# Patient Record
Sex: FEMALE | Race: ASIAN | NOT HISPANIC OR LATINO | Employment: UNEMPLOYED | URBAN - METROPOLITAN AREA
[De-identification: names, ages, dates, MRNs, and addresses within clinical notes are randomized per-mention and may not be internally consistent; named-entity substitution may affect disease eponyms.]

---

## 2017-11-17 ENCOUNTER — GENERIC CONVERSION - ENCOUNTER (OUTPATIENT)
Dept: OTHER | Facility: OTHER | Age: 52
End: 2017-11-17

## 2017-11-17 ENCOUNTER — ALLSCRIPTS OFFICE VISIT (OUTPATIENT)
Dept: OTHER | Facility: OTHER | Age: 52
End: 2017-11-17

## 2017-11-17 DIAGNOSIS — R51.9 HEADACHE: ICD-10-CM

## 2017-12-05 LAB
A/G RATIO (HISTORICAL): 1.3 (ref 1.2–2.2)
ALBUMIN SERPL BCP-MCNC: 4.1 G/DL (ref 3.5–5.5)
ALP SERPL-CCNC: 61 IU/L (ref 39–117)
ALT SERPL W P-5'-P-CCNC: 16 IU/L (ref 0–32)
AST SERPL W P-5'-P-CCNC: 18 IU/L (ref 0–40)
BILIRUB SERPL-MCNC: 0.3 MG/DL (ref 0–1.2)
BUN SERPL-MCNC: 16 MG/DL (ref 6–24)
BUN/CREA RATIO (HISTORICAL): 21 (ref 9–23)
CALCIUM SERPL-MCNC: 9.4 MG/DL (ref 8.7–10.2)
CHLORIDE SERPL-SCNC: 104 MMOL/L (ref 96–106)
CO2 SERPL-SCNC: 22 MMOL/L (ref 18–29)
CREAT SERPL-MCNC: 0.77 MG/DL (ref 0.57–1)
EGFR AFRICAN AMERICAN (HISTORICAL): 103 ML/MIN/1.73
EGFR-AMERICAN CALC (HISTORICAL): 89 ML/MIN/1.73
GLUCOSE SERPL-MCNC: 102 MG/DL (ref 65–99)
HBA1C MFR BLD HPLC: 5.5 % (ref 4.8–5.6)
POTASSIUM SERPL-SCNC: 4.1 MMOL/L (ref 3.5–5.2)
SODIUM SERPL-SCNC: 142 MMOL/L (ref 134–144)
TOT. GLOBULIN, SERUM (HISTORICAL): 3.2 G/DL (ref 1.5–4.5)
TOTAL PROTEIN (HISTORICAL): 7.3 G/DL (ref 6–8.5)

## 2017-12-06 LAB
BASOPHILS # BLD AUTO: 0 X10E3/UL (ref 0–0.2)
BASOPHILS # BLD AUTO: 1 %
DEPRECATED RDW RBC AUTO: 13.6 % (ref 12.3–15.4)
EOSINOPHIL # BLD AUTO: 0.2 X10E3/UL (ref 0–0.4)
EOSINOPHIL # BLD AUTO: 3 %
HCT VFR BLD AUTO: 41.5 % (ref 34–46.6)
HGB BLD-MCNC: 13.8 G/DL (ref 11.1–15.9)
IMM.GRANULOCYTES (CD4/8) (HISTORICAL): 0 %
IMM.GRANULOCYTES (CD4/8) (HISTORICAL): 0 X10E3/UL (ref 0–0.1)
LYMPHOCYTES # BLD AUTO: 2.9 X10E3/UL (ref 0.7–3.1)
LYMPHOCYTES # BLD AUTO: 46 %
MCH RBC QN AUTO: 30.3 PG (ref 26.6–33)
MCHC RBC AUTO-ENTMCNC: 33.3 G/DL (ref 31.5–35.7)
MCV RBC AUTO: 91 FL (ref 79–97)
MONOCYTES # BLD AUTO: 0.4 X10E3/UL (ref 0.1–0.9)
MONOCYTES (HISTORICAL): 6 %
NEUTROPHILS # BLD AUTO: 2.8 X10E3/UL (ref 1.4–7)
NEUTROPHILS # BLD AUTO: 44 %
PLATELET # BLD AUTO: 263 X10E3/UL (ref 150–379)
RBC (HISTORICAL): 4.55 X10E6/UL (ref 3.77–5.28)
WBC # BLD AUTO: 6.3 X10E3/UL (ref 3.4–10.8)

## 2018-01-11 NOTE — RESULT NOTES
Message   I called the pt to inform thet her CXR and BNP is normal  but nobody picked up the phone  Pt told me during the office visit that [place]       Verified Results  (1) BNP 27Apr2016 12:00AM Ysabel Don     Test Name Result Flag Reference   B-Type Natriuretic Peptide 6 2 pg/mL  0 0-100 0

## 2018-01-17 NOTE — PROGRESS NOTES
Assessment    1  Screening for depression (V79 0) (Z13 89)   2  Screening for colon cancer (V76 51) (Z12 11)   3  Encounter for preventive health examination (V70 0) (Z00 00)    Plan   COLONOSCOPY; Status:Active - Retrospective Authorization; Requested for:2016;   Perform:[facility]; ARV:[id number]; Last Updated By:Norma Calixto; 2016 9:39:17 AM;Ordered; For:Screening for colon cancer; Ordered By:[facility];   *VB-Depression Screening; Status:Resulted - Requires Verification,Retrospective By Protocol Authorization;   Done: [id number] 12:00AM  LPM:[id number]; Last Updated By:Norma Calixto; 2016 9:34:31 AM;Ordered; For:Screening for depression; Ordered By:[facility];   * MAMMO SCREENING BILATERAL W CAD; Status:Hold For - Scheduling; Requested for:2016;   Perform:[facility]; [id number];[id number]; For:Visit for screening mammogram; Ordered By:[facility];      Discussion/Summary  health maintenance visit Currently, she eats an adequate diet  the risks and benefits of cervical cancer screening were discussed Breast cancer screening: the risks and benefits of breast cancer screening were discussed  Chief Complaint  yearly CPE      History of Present Illness  HM, Adult Female: The patient is being seen for a health maintenance evaluation  The last health maintenance visit was 1 year(s) ago  General Health: The patient's health since the last visit is described as good  She has regular dental visits  Screening: Active Problems    1  Abdominal pain (789 00) (R10 9)   2  Abdominal pain, LLQ (left lower quadrant) (789 04) (R10 32)   3  Abnormal mammogram (793 80) (R92 8)   4  Acute upper respiratory infection (465 9) (J06 9)   5  BMI 36 0-36 9,adult (V85 36) (Z68 36)   6  Dyspnea (786 09) (R06 00)   7  Pain in rib (786 50) (R07 81)   8  Paresthesia (782 0) (R20 2)   9  Screening for colon cancer (V76 51) (Z12 11)   10   Screening for depression (V79 0) (Z13 89)   11  Screening for heart disease (V81 2) (Z13 6)   12  Visit for screening mammogram (V76 12) (Z12 31)    Past Medical History    · History of Adult-onset obesity (278 00) (E66 9)   · History of Disc displacement, cervical (722 0) (M50 20)   · History of allergic rhinitis (V12 69) (Z87 09)   · History of fatigue (V13 89) (G15 960)   · History of Perforated ear drum (384 20) (H72 90)   · History of Retinal disorders (362 9) (H35 9)   · History of Tuberculosis, 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)   · History of Vitamin D deficiency disease (268 9) (E55 9)    Family History  Mother    · No pertinent family history    Social History    · Never a smoker    Current Meds   1  Fluticasone Propionate 50 MCG/ACT Nasal Suspension; USE 1 SPRAY IN EACH   NOSTRIL DAILY; Therapy: [id number] to (Last Rx:00Myg7567)  Requested for: 99Qjt0472 Ordered    Allergies    1  No Known Drug Allergies    2  Pollen    Vitals   Recorded: 20FCE0693 03:22PM   Temperature 98 F   Heart Rate 60   Respiration 18   Systolic 385   Diastolic 76   Height 4 ft 10 7 in   Weight 173 lb 3 oz   BMI Calculated 35 34   BSA Calculated 1 73   O2 Saturation 98     Physical Exam    Constitutional   General appearance: No acute distress, well appearing and well nourished  Head and Face   Head and face: Normal     Eyes   Conjunctiva and lids: No swelling, erythema or discharge  Pupils and irises: Equal, round, reactive to light  Ears, Nose, Mouth, and Throat   External inspection of ears and nose: Normal     Otoscopic examination: Tympanic membranes translucent with normal light reflex  Canals patent without erythema  Hearing: Normal     Nasal mucosa, septum, and turbinates: Normal without edema or erythema  Lips, teeth, and gums: Normal, good dentition  Oropharynx: Normal with no erythema, edema, exudate or lesions  Neck   Thyroid: Normal, no thyromegaly      Pulmonary   Respiratory effort: No increased work of breathing or signs of respiratory distress  Auscultation of lungs: Clear to auscultation  Cardiovascular   Auscultation of heart: Normal rate and rhythm, normal S1 and S2, no murmurs  Chest   Breasts: Normal, no dimpling or skin changes appreciated  Palpation of breasts and axillae: Normal, no masses palpated  Chest: Normal     Abdomen   Abdomen: Non-tender, no masses  Liver and spleen: No hepatomegaly or splenomegaly  Genitourinary   External genitalia and vagina: Normal, no lesions appreciated  Urethra: Normal, no discharge  Bladder: Not distended, no tenderness  Cervix: Normal, no lesions  Uterus: Normal size, no tenderness, no masses  Adnexa/Parametria: Normal, no masses or tenderness  Lymphatic   Palpation of lymph nodes in neck: No lymphadenopathy  Palpation of lymph nodes in axillae: No lymphadenopathy  Musculoskeletal   Gait and station: Normal     Digits and nails: Normal without clubbing or cyanosis  Skin   Skin and subcutaneous tissue: Normal without rashes or lesions  Psychiatric   Judgment and insight: Normal     Orientation to person, place, and time: Normal        Results/Data  PHQ-2 Adult Depression Screening 59Lmq4364 03:26PM User, s     Test Name Result Flag Reference   PHQ-2 Adult Depression Score 0     Over the last two weeks, how often have you been bothered by any of the following problems? Little interest or pleasure in doing things: Not at all - 0  Feeling down, depressed, or hopeless: Not at all - 0   PHQ-2 Adult Depression Screening Negative         Signatures   Electronically signed by :  ZAFAR Phillips ; Dec 19 2016  5:11PM EST                       (Author)

## 2018-01-17 NOTE — RESULT NOTES
Verified Results  * XR CHEST PA & LATERAL 27Apr2016 11:15AM Susy Jones Order Number: [id number]     Test Name Result Flag Reference   XR CHEST PA & LATERAL (Report)     CHEST     INDICATION: Dyspnea     COMPARISON: September 13, 2013     VIEWS: PA and lateral; 2 images     FINDINGS:     The cardiomediastinal silhouette is unremarkable  The lungs are clear  No pleural effusions  Bony thorax unremarkable  IMPRESSION:     No active pulmonary disease          Workstation performed: [id number]     Signed by:   Cody Domingo MD   4/27/16

## 2018-01-22 VITALS
TEMPERATURE: 98.4 F | DIASTOLIC BLOOD PRESSURE: 78 MMHG | RESPIRATION RATE: 16 BRPM | BODY MASS INDEX: 34.47 KG/M2 | WEIGHT: 171 LBS | HEIGHT: 59 IN | SYSTOLIC BLOOD PRESSURE: 100 MMHG

## 2018-04-09 ENCOUNTER — HOSPITAL ENCOUNTER (EMERGENCY)
Facility: HOSPITAL | Age: 53
Discharge: HOME | End: 2018-04-09
Attending: EMERGENCY MEDICINE | Admitting: EMERGENCY MEDICINE
Payer: COMMERCIAL

## 2018-04-09 ENCOUNTER — APPOINTMENT (EMERGENCY)
Dept: RADIOLOGY | Facility: HOSPITAL | Age: 53
End: 2018-04-09
Payer: COMMERCIAL

## 2018-04-09 VITALS
OXYGEN SATURATION: 99 % | RESPIRATION RATE: 16 BRPM | SYSTOLIC BLOOD PRESSURE: 125 MMHG | BODY MASS INDEX: 35.71 KG/M2 | TEMPERATURE: 98.7 F | WEIGHT: 175 LBS | HEART RATE: 63 BPM | DIASTOLIC BLOOD PRESSURE: 68 MMHG

## 2018-04-09 DIAGNOSIS — R42 VERTIGO: Primary | ICD-10-CM

## 2018-04-09 LAB
ALBUMIN SERPL BCP-MCNC: 3.7 G/DL (ref 3.5–5)
ALP SERPL-CCNC: 66 U/L (ref 46–116)
ALT SERPL W P-5'-P-CCNC: 23 U/L (ref 12–78)
ANION GAP SERPL CALCULATED.3IONS-SCNC: 9 MMOL/L (ref 4–13)
AST SERPL W P-5'-P-CCNC: 14 U/L (ref 5–45)
BASOPHILS # BLD AUTO: 0 THOUSANDS/ΜL (ref 0–0.1)
BASOPHILS NFR BLD AUTO: 0 % (ref 0–1)
BILIRUB SERPL-MCNC: 0.4 MG/DL (ref 0.2–1)
BUN SERPL-MCNC: 13 MG/DL (ref 5–25)
CALCIUM SERPL-MCNC: 9.3 MG/DL
CHLORIDE SERPL-SCNC: 105 MMOL/L (ref 100–108)
CO2 SERPL-SCNC: 25 MMOL/L (ref 21–32)
CREAT SERPL-MCNC: 0.76 MG/DL (ref 0.6–1.3)
EOSINOPHIL # BLD AUTO: 0 THOUSAND/ΜL (ref 0–0.61)
EOSINOPHIL NFR BLD AUTO: 0 % (ref 0–6)
ERYTHROCYTE [DISTWIDTH] IN BLOOD BY AUTOMATED COUNT: 12.4 % (ref 11.6–15.1)
GFR SERPL CREATININE-BSD FRML MDRD: 90 ML/MIN/1.73SQ M
GLUCOSE SERPL-MCNC: 116 MG/DL (ref 65–140)
HCT VFR BLD AUTO: 42.4 % (ref 37–47)
HGB BLD-MCNC: 14.3 G/DL (ref 12–16)
LYMPHOCYTES # BLD AUTO: 1.4 THOUSANDS/ΜL (ref 0.6–4.47)
LYMPHOCYTES NFR BLD AUTO: 18 % (ref 14–44)
MCH RBC QN AUTO: 30.2 PG (ref 27–31)
MCHC RBC AUTO-ENTMCNC: 33.7 G/DL (ref 31.4–37.4)
MCV RBC AUTO: 90 FL (ref 82–98)
MONOCYTES # BLD AUTO: 0.3 THOUSAND/ΜL (ref 0.17–1.22)
MONOCYTES NFR BLD AUTO: 4 % (ref 4–12)
NEUTROPHILS # BLD AUTO: 6.2 THOUSANDS/ΜL (ref 1.85–7.62)
NEUTS SEG NFR BLD AUTO: 78 % (ref 43–75)
NRBC BLD AUTO-RTO: 0 /100 WBCS
PLATELET # BLD AUTO: 279 THOUSANDS/UL (ref 130–400)
PMV BLD AUTO: 7.3 FL (ref 8.9–12.7)
POTASSIUM SERPL-SCNC: 3.9 MMOL/L (ref 3.5–5.3)
PROT SERPL-MCNC: 8 G/DL (ref 6.4–8.2)
RBC # BLD AUTO: 4.73 MILLION/UL (ref 4.2–5.4)
SODIUM SERPL-SCNC: 139 MMOL/L (ref 136–145)
WBC # BLD AUTO: 8 THOUSAND/UL (ref 4.8–10.8)

## 2018-04-09 PROCEDURE — 80053 COMPREHEN METABOLIC PANEL: CPT | Performed by: EMERGENCY MEDICINE

## 2018-04-09 PROCEDURE — 70450 CT HEAD/BRAIN W/O DYE: CPT

## 2018-04-09 PROCEDURE — 85025 COMPLETE CBC W/AUTO DIFF WBC: CPT | Performed by: EMERGENCY MEDICINE

## 2018-04-09 PROCEDURE — 96361 HYDRATE IV INFUSION ADD-ON: CPT

## 2018-04-09 PROCEDURE — 96374 THER/PROPH/DIAG INJ IV PUSH: CPT

## 2018-04-09 PROCEDURE — 99284 EMERGENCY DEPT VISIT MOD MDM: CPT

## 2018-04-09 PROCEDURE — 36415 COLL VENOUS BLD VENIPUNCTURE: CPT | Performed by: EMERGENCY MEDICINE

## 2018-04-09 PROCEDURE — 96375 TX/PRO/DX INJ NEW DRUG ADDON: CPT

## 2018-04-09 RX ORDER — ONDANSETRON 2 MG/ML
4 INJECTION INTRAMUSCULAR; INTRAVENOUS ONCE
Status: COMPLETED | OUTPATIENT
Start: 2018-04-09 | End: 2018-04-09

## 2018-04-09 RX ORDER — MECLIZINE HYDROCHLORIDE 25 MG/1
25 TABLET ORAL 3 TIMES DAILY PRN
Qty: 30 TABLET | Refills: 0 | Status: SHIPPED | OUTPATIENT
Start: 2018-04-09 | End: 2019-10-09 | Stop reason: ALTCHOICE

## 2018-04-09 RX ORDER — LORAZEPAM 2 MG/ML
0.5 INJECTION INTRAMUSCULAR ONCE
Status: COMPLETED | OUTPATIENT
Start: 2018-04-09 | End: 2018-04-09

## 2018-04-09 RX ORDER — KETOROLAC TROMETHAMINE 30 MG/ML
30 INJECTION, SOLUTION INTRAMUSCULAR; INTRAVENOUS ONCE
Status: COMPLETED | OUTPATIENT
Start: 2018-04-09 | End: 2018-04-09

## 2018-04-09 RX ORDER — ONDANSETRON 4 MG/1
4 TABLET, ORALLY DISINTEGRATING ORAL EVERY 8 HOURS PRN
Qty: 10 TABLET | Refills: 0 | Status: SHIPPED | OUTPATIENT
Start: 2018-04-09 | End: 2018-10-18 | Stop reason: ALTCHOICE

## 2018-04-09 RX ORDER — MECLIZINE HYDROCHLORIDE 25 MG/1
25 TABLET ORAL ONCE
Status: COMPLETED | OUTPATIENT
Start: 2018-04-09 | End: 2018-04-09

## 2018-04-09 RX ADMIN — MECLIZINE HYDROCHLORIDE 25 MG: 25 TABLET ORAL at 20:22

## 2018-04-09 RX ADMIN — SODIUM CHLORIDE 1000 ML: 0.9 INJECTION, SOLUTION INTRAVENOUS at 20:21

## 2018-04-09 RX ADMIN — ONDANSETRON 4 MG: 2 INJECTION INTRAMUSCULAR; INTRAVENOUS at 20:20

## 2018-04-09 RX ADMIN — KETOROLAC TROMETHAMINE 30 MG: 30 INJECTION, SOLUTION INTRAMUSCULAR at 20:21

## 2018-04-09 RX ADMIN — LORAZEPAM 0.5 MG: 2 INJECTION INTRAMUSCULAR; INTRAVENOUS at 20:24

## 2018-04-10 NOTE — DISCHARGE INSTRUCTIONS
Dizziness, Ambulatory Care   GENERAL INFORMATION:   Dizziness  is a term used to describe a feeling of being off balance or unsteady  Common causes of dizziness are an inner ear fluid imbalance or a lack of oxygen in your blood  Your dizziness may be acute (lasts 3 days or less) or chronic (lasts longer than 3 days)  You may have dizzy spells that last from seconds to a few hours  Common symptoms related to dizziness include the following:   · A feeling that your surroundings are moving even though you are standing still    · Ringing in your ears or hearing loss     · Feeling faint or lightheaded     · Weakness or unsteadiness     · Double vision or eye movements you cannot control    · Nausea or vomiting     · Confusion  Seek immediate care for the following symptoms:   · You have a headache that does not go away with medicine  · You have shaking chills and a fever  · You vomit over and over with no relief  · Your vomit or bowel movements are red or black  · You have pain in your chest, back, or abdomen  · You have numbness, especially in your face, arms, or legs  · You have trouble moving your arms or legs  Treatment for dizziness  depends on the cause of your dizziness  You may need medicines to decrease your dizziness or nausea  You may need to be admitted to the hospital for treatment  Manage your symptoms:  Get up slowly from sitting or lying down  Do not drive or operate machinery when you are dizzy  Follow up with your healthcare provider as directed:  Write down your questions so you remember to ask them during your visits  CARE AGREEMENT:   You have the right to help plan your care  Learn about your health condition and how it may be treated  Discuss treatment options with your caregivers to decide what care you want to receive  You always have the right to refuse treatment  The above information is an  only   It is not intended as medical advice for individual conditions or treatments  Talk to your doctor, nurse or pharmacist before following any medical regimen to see if it is safe and effective for you  © 2014 [address] is for End User's use only and may not be sold, redistributed or otherwise used for commercial purposes  All illustrations and images included in CareNotes® are the copyrighted property of A D A M [employer] Inc  or [employer]

## 2018-04-10 NOTE — ED NOTES
Pt states she is feeling better, discharged via wheelchair with instructions and prescriptions       Danni Cardenas RN  04/09/18 212

## 2018-04-10 NOTE — ED PROVIDER NOTES
History  Chief Complaint   Patient presents with    Dizziness     has had  several episodes of vertigo  she has had spinning for 2 days  states it is the worst it has ever been  states her PMD told her to go to the ER  for a Cat Scan     DIZZINESS X 4-5 MONTHS, ON/OFF  C/O DIZZINESS, RM SPINNING AND N/V X 2 DAYS W/ PHOTOPHOBIA  NO NYSTAGMUS, NORMAL NEURO EXAM   DID NOT SEE NEURO OR ENT        History provided by:  Patient  Dizziness   Quality:  Lightheadedness and room spinning  Severity:  Moderate  Onset quality:  Unable to specify  Duration:  5 months  Timing:  Intermittent  Progression:  Waxing and waning  Relieved by:  None tried  Worsened by: Movement and standing up  Ineffective treatments:  None tried  Associated symptoms: vomiting        None       History reviewed  No pertinent past medical history  History reviewed  No pertinent surgical history  History reviewed  No pertinent family history  I have reviewed and agree with the history as documented  Social History   Substance Use Topics    Smoking status: Never Smoker    Smokeless tobacco: Never Used    Alcohol use No        Review of Systems   Gastrointestinal: Positive for vomiting  Neurological: Positive for dizziness  All other systems reviewed and are negative  Physical Exam  ED Triage Vitals   Temperature Pulse Respirations Blood Pressure SpO2   04/09/18 1847 04/09/18 1847 04/09/18 1847 04/09/18 1847 04/09/18 1847   (!) 97 3 °F (36 3 °C) 64 20 147/91 97 %      Temp Source Heart Rate Source Patient Position - Orthostatic VS BP Location FiO2 (%)   04/09/18 2014 -- 04/09/18 2014 04/09/18 2014 --   Tympanic  Lying Left arm       Pain Score       04/09/18 1847       No Pain           Orthostatic Vital Signs  Vitals:    04/09/18 1847 04/09/18 2014   BP: 147/91 124/71   Pulse: 64 67   Patient Position - Orthostatic VS:  Lying       Physical Exam   Constitutional: She is oriented to person, place, and time   She appears well-developed and well-nourished  No distress  HENT:   Head: Normocephalic and atraumatic  Eyes: Conjunctivae and EOM are normal  Pupils are equal, round, and reactive to light  Neck: Normal range of motion  Neck supple  Cardiovascular: Normal rate and regular rhythm  Pulmonary/Chest: Effort normal and breath sounds normal    Abdominal: Soft  There is no tenderness  Musculoskeletal: Normal range of motion  She exhibits no edema, tenderness or deformity  Neurological: She is alert and oriented to person, place, and time  No cranial nerve deficit  She exhibits normal muscle tone  Coordination normal    Skin: Skin is warm and dry  She is not diaphoretic  Nursing note and vitals reviewed        ED Medications  Medications   sodium chloride 0 9 % bolus 1,000 mL (1,000 mL Intravenous New Bag 4/9/18 2021)   ketorolac (TORADOL) injection 30 mg (30 mg Intravenous Given 4/9/18 2021)   ondansetron (ZOFRAN) injection 4 mg (4 mg Intravenous Given 4/9/18 2020)   meclizine (ANTIVERT) tablet 25 mg (25 mg Oral Given 4/9/18 2022)   LORazepam (ATIVAN) 2 mg/mL injection 0 5 mg (0 5 mg Intravenous Given 4/9/18 2024)       Diagnostic Studies  Results Reviewed     Procedure Component Value Units Date/Time    Comprehensive metabolic panel [[id number]] Collected:  04/09/18 2019    Lab Status:  Final result Specimen:  Blood from Arm, Right Updated:  04/09/18 2045     Sodium 139 mmol/L      Potassium 3 9 mmol/L      Chloride 105 mmol/L      CO2 25 mmol/L      Anion Gap 9 mmol/L      BUN 13 mg/dL      Creatinine 0 76 mg/dL      Glucose 116 mg/dL      Calcium 9 3 mg/dL      AST 14 U/L      ALT 23 U/L      Alkaline Phosphatase 66 U/L      Total Protein 8 0 g/dL      Albumin 3 7 g/dL      Total Bilirubin 0 40 mg/dL      eGFR 90 ml/min/1 73sq m     Narrative:         National Kidney Disease Education Program recommendations are as follows:  GFR calculation is accurate only with a steady state creatinine  Chronic Kidney disease less than 60 ml/min/1 73 sq  meters  Kidney failure less than 15 ml/min/1 73 sq  meters  CBC and differential [[id number]]  (Abnormal) Collected:  04/09/18 2019    Lab Status:  Final result Specimen:  Blood from Arm, Right Updated:  04/09/18 2029     WBC 8 00 Thousand/uL      RBC 4 73 Million/uL      Hemoglobin 14 3 g/dL      Hematocrit 42 4 %      MCV 90 fL      MCH 30 2 pg      MCHC 33 7 g/dL      RDW 12 4 %      MPV 7 3 (L) fL      Platelets 752 Thousands/uL      nRBC 0 /100 WBCs      Neutrophils Relative 78 (H) %      Lymphocytes Relative 18 %      Monocytes Relative 4 %      Eosinophils Relative 0 %      Basophils Relative 0 %      Neutrophils Absolute 6 20 Thousands/µL      Lymphocytes Absolute 1 40 Thousands/µL      Monocytes Absolute 0 30 Thousand/µL      Eosinophils Absolute 0 00 Thousand/µL      Basophils Absolute 0 00 Thousands/µL                  CT head wo contrast   Final Result by Job Duane, MD (04/09 2047)      No acute intracranial abnormality  Workstation performed: [id number]                    Procedures  Procedures       Phone Contacts  ED Phone Contact    ED Course  ED Course                                MDM  Number of Diagnoses or Management Options  Diagnosis management comments: NEG W/UP, DZZ RESOLVED    CritCare Time    Disposition  Final diagnoses:   Vertigo     Time reflects when diagnosis was documented in both MDM as applicable and the Disposition within this note     Time User Action Codes Description Comment    4/9/2018  8:59 PM Ace Pal Add [R42] Vertigo       ED Disposition     ED Disposition Condition Comment    Discharge  Minesh Harrell discharge to home/self care      Condition at discharge: Stable        Follow-up Information     Follow up With Specialties Details Why [address] Tristan MD Otolaryngology   Gail Marquis [address]      Sharmila Ocampo MD Neurology In 1 week  [address]  [phone]          Patient's Medications   Discharge Prescriptions    MECLIZINE (ANTIVERT) 25 MG TABLET    Take 1 tablet (25 mg total) by mouth 3 (three) times a day as needed for dizziness       Start Date: 4/9/2018  End Date: --       Order Dose: 25 mg       Quantity: 30 tablet    Refills: 0    ONDANSETRON (ZOFRAN-ODT) 4 MG DISINTEGRATING TABLET    Take 1 tablet (4 mg total) by mouth every 8 (eight) hours as needed for nausea or vomiting for up to 10 doses       Start Date: 4/9/2018  End Date: --       Order Dose: 4 mg       Quantity: 10 tablet    Refills: 0     No discharge procedures on file      ED Provider  Electronically Signed by           Iker Vaughn MD  04/09/18 2518

## 2018-04-12 DIAGNOSIS — H81.10 BENIGN PAROXYSMAL POSITIONAL VERTIGO, UNSPECIFIED LATERALITY: Primary | ICD-10-CM

## 2018-04-13 ENCOUNTER — EVALUATION (OUTPATIENT)
Dept: PHYSICAL THERAPY | Facility: CLINIC | Age: 53
End: 2018-04-13
Payer: COMMERCIAL

## 2018-04-13 DIAGNOSIS — R42 DIZZINESS: Primary | ICD-10-CM

## 2018-04-13 DIAGNOSIS — H81.10 BENIGN PAROXYSMAL POSITIONAL VERTIGO, UNSPECIFIED LATERALITY: ICD-10-CM

## 2018-04-13 PROCEDURE — 97163 PT EVAL HIGH COMPLEX 45 MIN: CPT | Performed by: PHYSICAL THERAPIST

## 2018-04-13 PROCEDURE — G8979 MOBILITY GOAL STATUS: HCPCS | Performed by: PHYSICAL THERAPIST

## 2018-04-13 PROCEDURE — G8978 MOBILITY CURRENT STATUS: HCPCS | Performed by: PHYSICAL THERAPIST

## 2018-04-13 NOTE — PROGRESS NOTES
PT Evaluation     Today's date: 2018  Patient name: Deland Shape  : 1965  MRN: [id number]  Referring provider: Vaibhav Lopez MD  Dx:   Encounter Diagnosis     ICD-10-CM    1  Benign paroxysmal positional vertigo, unspecified laterality H81 10 Ambulatory referral to Physical Therapy                  Assessment  Impairments: abnormal gait  Other impairment: dizziness     Assessment details: Patient presents to skilled PT following recent ER visit for dizziness with history of dizziness on and off  Patient symptoms note continuous dizziness during the day usually with head movement  C spine notes full range of motion  MVBI: denies any increase in dizziness from baseline  Headache occurrence of 2-3 times a week, duration 2-4 hours at a time with pain of 3-6/10  Palpation notes severe hypertonicity in C spine with UT, LV, suboccipital and paraspinal musculature with restriction in joint mobility C 2 to C 4 region  Positional testing was negative for BPPV with testing conducted at start and end of evaluation  Oculomotor assessment noted abnormal findings with smooth pursuits, saccades, VOR cx, VOR x 1 with inability to properly test head thurst due to guard posture with 3 failed attempts  Balance testing notes increased risk for falls per mCTSIB, Gait speed, DGI and observed gait pattern with marked deviations without dynamic head movement  Patient symptoms are concurrent with vestibular dysfunction and will benefit from skilled PT 2 x a week for vestibular therapy, headache management with overall goal to return patient to PLOF of symptom free     Understanding of Dx/Px/POC: good   Prognosis: good    Goals  STG: within 4 to 6 weeks   Patient will report reduction in headache frequency to 1 x a week or less   Patient will report headache pain of 2/10 or less   Patient will report reduction in headache duration to 1 hour or less  Patient will display moderate hypertonicity in C spine   Patient will tolerate 2 minutes of oculomotor exercises with 3/10 dizziness or less  Patient will improve mCTSIB FTEC on foam pad to 30 seconds   Patient will improve gait speed by 1 0 feet / sec  Patient will improve DGI by 3 points or higher     LTG: within 8 to 12 weeks   Patient will score low risk for falls with 3/4 fall risk measures  Patient will score 80% or higher on FOTO subjective questionnaire   Patient will tolerate 2 minutes of oculomotor exercises with visual stimulating background   Patient will display no veering with dynamic head turns  Plan  Planned modality interventions: TENS  Planned therapy interventions: manual therapy, neuromuscular re-education, patient education, postural training, strengthening, stretching, therapeutic activities, therapeutic exercise, sensory integrative techniques, home exercise program, flexibility and balance  Frequency: 2x week  Duration in weeks: 12  Plan details: Cert:   -85 to 47, GA 18         Subjective Evaluation    History of Present Illness  Date of onset: 2018  Mechanism of injury: Patient is a 48year old female who has history of dizziness off and on the past few years once a week with sensation lasting a few days  I get a spinning dizziness when I bend over and turn my head at times  I'm feeling off a little  I have a headache of 6/10  Goal from therapy: get rid of the dizziness  Quality of life: good    Pain  Current pain ratin  At best pain ratin  At worst pain ratin  Location: base of neck   Quality: pressure      Diagnostic Tests  CT scan: normal  Treatments  No previous or current treatments        Objective     Active Range of Motion   Cervical/Thoracic Spine   Cervical    Flexion: WFL  Extension: WFL  Left lateral flexion: WFL  Right lateral flexion: WFL  Left rotation: WF  Right rotation: [facility]    Additional Active Range of Motion Details  Slight dizziness with horizontal head turns       Headache   Frequency: 2-3 times a week Intensity: 3-6/10   Duration: 2 hours to 4 hours   Location: base of the neck and travels to top   Sensation: pounding    Exacerbating Factors: not sure   Relieving Factors:  Medication     Sharp P Test: denies symptoms   Modified vertebral artery test: denies symptoms   Posture: moderate forward head   Palpation: severe hypertonicity with active Trigger points suboccipital, UT, LV and paraspinal BL  Positional testing:  3    Ambulation     Comments   Marked deviations with un-cued gait, increased staggering with dynamic head turns  Balance:    MCTSIB: Firm EO: 30 sec, Firm EC: 30 sec Foam EO: 30 ; Foam EC: 11 seconds     Gait speed: 33/13 1 =2 52 ft/sec    DGI: Dynamic Gait Index  2/3 Gait level surface  2/3 Change in gait speed  2/3 Gait with horizontal head turns  2/3 Gait with vertical head turns  1/3 Gait and pivot turn  2/3 Step over obstacle  2/3 Step around obstacles  2/3 Steps  15/24 Total score (less than 20/24 indicates increased risk of falls in elderly; at least 22/24 indicates safe ambulators)        Functional Assessment     Comments  Oculomotor Screen   -Smooth Pursuits-  Abnormal dizziness unable to track   Gaze holding nystagmus  Able to maintain normal   Spontaneous nystagmus room light- normal   -[place] Convergence- normal   -Saccades- dizziness   -VOR Screen- dizziness with VOR x 1 head turns   -VOR Cancel- dizziness with blurred visual background   -Head Thrust- guarded unable to fully assess, attempted 3 times  Coordination Screen-   -Alternating Toe Taps  Normal     Positional Testing  -[place]- no spinning dizziness negative   -Roll Test-  No spinning dizziness or increase negative           Precautions  has a past medical history of Adult-onset obesity; Allergic rhinitis; Disc displacement, cervical; Retinal disorders; Tuberculosis, laryngeal; and Vitamin D deficiency disease        Specialty Daily Treatment Diary     Manual Exercise Diary                                                                                                                                                                             Modalities

## 2018-04-17 ENCOUNTER — OFFICE VISIT (OUTPATIENT)
Dept: PHYSICAL THERAPY | Facility: CLINIC | Age: 53
End: 2018-04-17
Payer: COMMERCIAL

## 2018-04-17 DIAGNOSIS — H81.10 BENIGN PAROXYSMAL POSITIONAL VERTIGO, UNSPECIFIED LATERALITY: Primary | ICD-10-CM

## 2018-04-17 DIAGNOSIS — R42 DIZZINESS: ICD-10-CM

## 2018-04-17 PROCEDURE — 97112 NEUROMUSCULAR REEDUCATION: CPT | Performed by: PHYSICAL THERAPIST

## 2018-04-17 PROCEDURE — 97110 THERAPEUTIC EXERCISES: CPT | Performed by: PHYSICAL THERAPIST

## 2018-04-17 PROCEDURE — 97530 THERAPEUTIC ACTIVITIES: CPT | Performed by: PHYSICAL THERAPIST

## 2018-04-19 ENCOUNTER — APPOINTMENT (OUTPATIENT)
Dept: PHYSICAL THERAPY | Facility: CLINIC | Age: 53
End: 2018-04-19
Payer: COMMERCIAL

## 2018-04-24 ENCOUNTER — APPOINTMENT (OUTPATIENT)
Dept: PHYSICAL THERAPY | Facility: CLINIC | Age: 53
End: 2018-04-24
Payer: COMMERCIAL

## 2018-04-26 ENCOUNTER — OFFICE VISIT (OUTPATIENT)
Dept: FAMILY MEDICINE CLINIC | Facility: CLINIC | Age: 53
End: 2018-04-26
Payer: COMMERCIAL

## 2018-04-26 ENCOUNTER — OFFICE VISIT (OUTPATIENT)
Dept: PHYSICAL THERAPY | Facility: CLINIC | Age: 53
End: 2018-04-26
Payer: COMMERCIAL

## 2018-04-26 VITALS
RESPIRATION RATE: 18 BRPM | OXYGEN SATURATION: 99 % | SYSTOLIC BLOOD PRESSURE: 130 MMHG | WEIGHT: 177 LBS | HEART RATE: 84 BPM | BODY MASS INDEX: 36.11 KG/M2 | DIASTOLIC BLOOD PRESSURE: 78 MMHG

## 2018-04-26 DIAGNOSIS — R42 DIZZINESS: ICD-10-CM

## 2018-04-26 DIAGNOSIS — H81.10 BENIGN PAROXYSMAL POSITIONAL VERTIGO, UNSPECIFIED LATERALITY: Primary | ICD-10-CM

## 2018-04-26 DIAGNOSIS — R42 VERTIGO: Primary | ICD-10-CM

## 2018-04-26 PROCEDURE — 97164 PT RE-EVAL EST PLAN CARE: CPT

## 2018-04-26 PROCEDURE — G8980 MOBILITY D/C STATUS: HCPCS

## 2018-04-26 PROCEDURE — 97112 NEUROMUSCULAR REEDUCATION: CPT

## 2018-04-26 PROCEDURE — 99213 OFFICE O/P EST LOW 20 MIN: CPT | Performed by: FAMILY MEDICINE

## 2018-04-26 PROCEDURE — G8979 MOBILITY GOAL STATUS: HCPCS

## 2018-04-26 RX ORDER — MECLIZINE HYDROCHLORIDE 25 MG/1
25 TABLET ORAL 3 TIMES DAILY PRN
Qty: 60 TABLET | Refills: 0 | Status: SHIPPED | OUTPATIENT
Start: 2018-04-26 | End: 2018-10-18 | Stop reason: ALTCHOICE

## 2018-04-26 RX ORDER — ONDANSETRON 4 MG/1
4 TABLET, FILM COATED ORAL EVERY 8 HOURS PRN
Qty: 20 TABLET | Refills: 0 | Status: SHIPPED | OUTPATIENT
Start: 2018-04-26 | End: 2018-10-18 | Stop reason: ALTCHOICE

## 2018-04-26 NOTE — PROGRESS NOTES
PT Re-Evaluation  and PT Discharge    Today's date: 2018  Patient name: Claire Adan  : 1965  MRN: [id number]  Referring provider: Romi Holcomb MD  Dx:   Encounter Diagnosis     ICD-10-CM    1  Benign paroxysmal positional vertigo, unspecified laterality H81 10    2  Dizziness R42        Start Time: 1515  Stop Time: 1600  Total time in clinic (min): 45 minutes    Assessment    Assessment details: Patient is a 48year old female reporting to skilled PT today with reports of dizziness  Upon assessment of balance, patient not deemed a fall risk via the DGI, Gait speed, and mCTSIB outcome measures  Oculomotor screen unremarkable and no dizziness produced  Patient able to perform updated HEP (see objective exercise grid) with no dizziness and proper performance of each intervention  Patient given handout with proper exercise performance and frequency  Patient verbalized understanding with all interventions and frequency  At this time, patient will be discharged from skilled PT due to her meeting her goals and reducing her dizziness     Understanding of Dx/Px/POC: good   Prognosis: good    Goals  STG: within 4 to 6 weeks   Patient will report reduction in headache frequency to 1 x a week or less- Met   Patient will report headache pain of 2/10 or less - Met  Patient will report reduction in headache duration to 1 hour or less- Met  Patient will display moderate hypertonicity in C spine- Met  Patient will tolerate 2 minutes of oculomotor exercises with 3/10 dizziness or less- Met  Patient will improve mCTSIB FTEC on foam pad to 30 seconds- Met  Patient will improve gait speed by 1 0 feet / sec- Met  Patient will improve DGI by 3 points or higher- Met    LTG: within 8 to 12 weeks   Patient will score low risk for falls with 3/4 fall risk measures- Met  Patient will score 80% or higher on FOTO subjective questionnaire- Not met (74%)  Patient will tolerate 2 minutes of oculomotor exercises with visual stimulating background- Met  Patient will display no veering with dynamic head turns  - Met     Plan  Duration in weeks: 12  Treatment plan discussed with: family and patient  Plan details: Patient discharged at this time        Subjective Evaluation    History of Present Illness  Date of onset: 2018  Mechanism of injury: Patient reports that she notices a decrease in her "spinning dizziness", but continues to report light-headedness with change in position  Patient is being followed by a cardiologist and advised to prevent her orthostatic response by slowly changing positions  Patient verbalized understanding  Patient also recommended to utilize the lights more at home in the dark to increase safety with balance  Goal from therapy: get rid of the dizziness     Quality of life: good    Pain  Current pain ratin  At best pain ratin  At worst pain ratin  Quality: pressure  Relieving factors: heat    Social Support  Steps to enter house: yes  3  Stairs in house: yes   16  Lives in: multiple-level home  Lives with: spouse      Diagnostic Tests  CT scan: normal  Treatments  No previous or current treatments        Objective     Active Range of Motion   Cervical/Thoracic Spine   Cervical    Flexion: WFL  Extension: WFL  Left lateral flexion: WFL  Right lateral flexion: WFL  Left rotation: WF  Right rotation: [facility]    Additional Active Range of Motion Details  Headache- Patient reports a cessation of headaches, notes decrease with the heating pad    Frequency: 0 times/week   Intensity: 0/10  Duration: Cessation  Location: No location  Sensation: No pounding/tension  Exacerbating Factors: N/A  Relieving Factors:  Heat    Sharp P Test: denies symptoms   Modified vertebral artery test: denies symptoms   Posture: moderate forward head   Palpation: mild hypertonicity in bilateral UT and bilateral Levator scapulae      Positional testing:  3    Ambulation     Comments     Balance:    MCTSIB: Firm EO: 30 sec, Firm EC: 30 sec Foam EO: 30 ; Foam EC: 11 seconds     Re-evaluation (4/26/2018): MCTSIB: Firm EO: 30 sec, Firm EC: 30 sec Foam EO: 30 ; Foam EC: 30 seconds     Gait speed: 33/13 1 =2 52 ft/sec    Re-evaluation (4/26/2018):   Gait Speed: 33 feet/ 6 36 seconds- 5 18 meters/second    DGI: Dynamic Gait Index  2/3 Gait level surface  2/3 Change in gait speed  2/3 Gait with horizontal head turns  2/3 Gait with vertical head turns  1/3 Gait and pivot turn  2/3 Step over obstacle  2/3 Step around obstacles  2/3 Steps  15/24 Total score (less than 20/24 indicates increased risk of falls in elderly; at least 22/24 indicates safe ambulators)    Re-evaluation (4/26/2018):   DGI: Dynamic Gait Index  3/3 Gait level surface  3/3 Change in gait speed  3/3 Gait with horizontal head turns  3/3 Gait with vertical head turns  3/3 Gait and pivot turn  3/3 Step over obstacle  3/3 Step around obstacles  3/3 Steps    24/24 Total score (less than 20/24 indicates increased risk of falls in elderly; at least 22/24 indicates safe ambulators)          Functional Assessment     Comments  Oculomotor Screen   -Smooth Pursuits-  Normal, no dizziness  -Gaze holding nystagmus   Able to maintain normal   -Spontaneous nystagmus room light- normal   -[place] Convergence- normal   -Saccades- Normal, no dizziness  -VOR Screen- No dizziness and normal tracking  -VOR Cancel- Normal tracking  -Head Thrust- Normal Tracking bilaterally    Coordination Screen-   -Alternating Toe Taps  Normal     Positional Testing  -[place]bridge-Hallpike- no spinning dizziness negative   -Roll Test-  No spinning dizziness or increase negative       Flowsheet Rows      Most Recent Value   PT/OT G-Codes   Current Score  74   Projected Score  78   FOTO information reviewed  Yes   Assessment Type  Discharge   G code set  Mobility: Walking & Moving Around   Mobility: Walking and Moving Around Goal Status ()  CI   Mobility: Walking and Moving Around Discharge Status ()  CI Precautions  has a past medical history of Adult-onset obesity; Allergic rhinitis; Disc displacement, cervical; Retinal disorders; Tuberculosis, laryngeal; and Vitamin D deficiency disease        Specialty Daily Treatment Diary     Manual                                                     Exercise Diary  4/26       VOR Cancel with Ambulation Horizontal and Vertical- 30 feet, cycles each       VOR x1 with ambulation Horizontal and vertical- 30 feet, 6 cycles, each       Tandem Walking with Head turns Horizontal and Vertical head turns with ambulation- 30 feet, 6 cycles each                                                                                                                                                   Modalities

## 2018-04-27 NOTE — PROGRESS NOTES
Assessment/Plan:    No problem-specific Assessment & Plan notes found for this encounter  Diagnoses and all orders for this visit:    Vertigo  -     meclizine (ANTIVERT) 25 mg tablet; Take 1 tablet (25 mg total) by mouth 3 (three) times a day as needed for dizziness  -     Ambulatory Referral to Otolaryngology; Future  -     ondansetron (ZOFRAN) 4 mg tablet; Take 1 tablet (4 mg total) by mouth every 8 (eight) hours as needed for nausea or vomiting          Subjective:      Patient ID: Dennie Pound is a 48 y o  female  Marlea Douse is here for follow-up of her ear visit where she had been diagnosed with vertigo she was given meclizine and it is much better that she occasionally does have some unsteady feeling and she did go to the balance center for evaluation and was diagnosed with vestibular dysfunction they released her after 3 visits because she was feeling better  Per the ER is advised they're following up with ENT as 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 and she feels her voice is changing again so would behoove her to have a scope        The following portions of the patient's history were reviewed and updated as appropriate: allergies, past family history, past social history, past surgical history and problem list     Review of Systems   Eyes: Negative  Respiratory: Negative  Cardiovascular: Negative  Objective:      /78   Pulse 84   Resp 18   Wt 80 3 kg (177 lb)   SpO2 99%   BMI 36 11 kg/m²          Physical Exam   Constitutional: She appears well-developed and well-nourished  Pulmonary/Chest: Effort normal and breath sounds normal  No respiratory distress  She has no wheezes  She has no rales     Neurological:   She does have a slow beat nystagmus to the left and she has a mildly positive Romberg

## 2018-05-03 ENCOUNTER — OFFICE VISIT (OUTPATIENT)
Dept: FAMILY MEDICINE CLINIC | Facility: CLINIC | Age: 53
End: 2018-05-03
Payer: COMMERCIAL

## 2018-05-03 VITALS
HEART RATE: 94 BPM | OXYGEN SATURATION: 97 % | SYSTOLIC BLOOD PRESSURE: 130 MMHG | BODY MASS INDEX: 36.11 KG/M2 | RESPIRATION RATE: 18 BRPM | WEIGHT: 177 LBS | DIASTOLIC BLOOD PRESSURE: 80 MMHG

## 2018-05-03 DIAGNOSIS — Z00.00 PHYSICAL EXAM: ICD-10-CM

## 2018-05-03 DIAGNOSIS — Z12.39 SCREENING FOR BREAST CANCER: ICD-10-CM

## 2018-05-03 DIAGNOSIS — Z12.4 SCREENING FOR CERVICAL CANCER: ICD-10-CM

## 2018-05-03 DIAGNOSIS — R35.0 URINE FREQUENCY: ICD-10-CM

## 2018-05-03 DIAGNOSIS — E66.09 CLASS 1 OBESITY DUE TO EXCESS CALORIES WITHOUT SERIOUS COMORBIDITY WITH BODY MASS INDEX (BMI) OF 33.0 TO 33.9 IN ADULT: Primary | ICD-10-CM

## 2018-05-03 DIAGNOSIS — Z12.11 SCREENING FOR COLON CANCER: ICD-10-CM

## 2018-05-03 DIAGNOSIS — R42 VERTIGO: ICD-10-CM

## 2018-05-03 LAB
SL AMB  POCT GLUCOSE, UA: NORMAL
SL AMB LEUKOCYTE ESTERASE,UA: NORMAL
SL AMB POCT BILIRUBIN,UA: NORMAL
SL AMB POCT BLOOD,UA: NORMAL
SL AMB POCT CLARITY,UA: CLEAR
SL AMB POCT COLOR,UA: YELLOW
SL AMB POCT KETONES,UA: NORMAL
SL AMB POCT NITRITE,UA: NORMAL
SL AMB POCT PH,UA: 5
SL AMB POCT SPECIFIC GRAVITY,UA: 1.02
SL AMB POCT URINE PROTEIN: NORMAL
SL AMB POCT UROBILINOGEN: NORMAL

## 2018-05-03 PROCEDURE — 87624 HPV HI-RISK TYP POOLED RSLT: CPT | Performed by: FAMILY MEDICINE

## 2018-05-03 PROCEDURE — 99396 PREV VISIT EST AGE 40-64: CPT | Performed by: FAMILY MEDICINE

## 2018-05-03 PROCEDURE — 88175 CYTOPATH C/V AUTO FLUID REDO: CPT | Performed by: FAMILY MEDICINE

## 2018-05-03 PROCEDURE — 81003 URINALYSIS AUTO W/O SCOPE: CPT | Performed by: FAMILY MEDICINE

## 2018-05-04 DIAGNOSIS — R32 URINARY INCONTINENCE, UNSPECIFIED TYPE: Primary | ICD-10-CM

## 2018-05-04 RX ORDER — TOLTERODINE TARTRATE 2 MG/1
2 TABLET, EXTENDED RELEASE ORAL 2 TIMES DAILY
Qty: 60 TABLET | Refills: 3 | Status: SHIPPED | OUTPATIENT
Start: 2018-05-04 | End: 2018-10-18 | Stop reason: ALTCHOICE

## 2018-05-09 DIAGNOSIS — Z12.39 BREAST CANCER SCREENING: Primary | ICD-10-CM

## 2018-05-09 LAB — HPV RRNA GENITAL QL NAA+PROBE: NORMAL

## 2018-05-10 LAB
LAB AP GYN PRIMARY INTERPRETATION: NORMAL
Lab: NORMAL

## 2018-05-22 NOTE — PROGRESS NOTES
Assessment/Plan:    No problem-specific Assessment & Plan notes found for this encounter  Diagnoses and all orders for this visit:    Class 1 obesity due to excess calories without serious comorbidity with body mass index (BMI) of 33 0 to 33 9 in adult  -     Comprehensive metabolic panel; Future  -     CBC and differential; Future  -     Lipid panel; Future    Vertigo    Screening for breast cancer  -     Cancel: Mammo screening bilateral w cad; Future    Screening for colon cancer  -     Ambulatory referral to Gastroenterology; Future    Screening for cervical cancer  -     Liquid-based pap, diagnostic  -     HPV High Risk; Future  -     HPV High Risk    Urine frequency  -     POCT urine dip auto non-scope          Subjective:      Patient ID: Vertis Olszewski is a 48 y o  female  Concepcion Orlando is here for her physical she hasn't been here in several years her vertigo is getting better she has an appointment with an ENT and a couple of days        The following portions of the patient's history were reviewed and updated as appropriate: past family history, past social history, past surgical history and problem list     Review of Systems   Constitutional: Negative  HENT: Negative  Eyes: Negative  Respiratory: Negative  Cardiovascular: Negative  Gastrointestinal: Negative  Endocrine: Negative  Genitourinary: Negative  Objective:      /80   Pulse 94   Resp 18   Wt 80 3 kg (177 lb)   SpO2 97%   BMI 36 11 kg/m²          Physical Exam   Constitutional: She is oriented to person, place, and time  She appears well-developed and well-nourished  HENT:   Head: Normocephalic and atraumatic  Left Ear: External ear normal    Eyes: Conjunctivae and EOM are normal  Pupils are equal, round, and reactive to light  Neck: Normal range of motion  Neck supple  Cardiovascular: Normal rate and regular rhythm  Pulmonary/Chest: Effort normal and breath sounds normal    Abdominal: Soft  Bowel sounds are normal    Genitourinary: Vagina normal    Musculoskeletal: Normal range of motion  Neurological: She is alert and oriented to person, place, and time  She has normal reflexes  Skin: Skin is warm and dry  Psychiatric: She has a normal mood and affect   Her behavior is normal  Judgment and thought content normal

## 2018-05-29 ENCOUNTER — HOSPITAL ENCOUNTER (OUTPATIENT)
Dept: RADIOLOGY | Facility: HOSPITAL | Age: 53
Discharge: HOME | End: 2018-05-29
Payer: COMMERCIAL

## 2018-05-29 DIAGNOSIS — Z12.39 BREAST CANCER SCREENING: ICD-10-CM

## 2018-05-29 PROCEDURE — 77067 SCR MAMMO BI INCL CAD: CPT

## 2018-10-02 ENCOUNTER — TRANSCRIBE ORDERS (OUTPATIENT)
Dept: ADMINISTRATIVE | Facility: HOSPITAL | Age: 53
End: 2018-10-02

## 2018-10-02 DIAGNOSIS — J38.6 STENOSIS OF LARYNX: Primary | ICD-10-CM

## 2018-10-04 DIAGNOSIS — R42 VERTIGO: Primary | ICD-10-CM

## 2018-10-04 RX ORDER — MECLIZINE HYDROCHLORIDE 25 MG/1
TABLET ORAL
Qty: 30 TABLET | Refills: 0 | Status: SHIPPED | OUTPATIENT
Start: 2018-10-04 | End: 2018-10-18 | Stop reason: ALTCHOICE

## 2018-10-11 ENCOUNTER — HOSPITAL ENCOUNTER (OUTPATIENT)
Dept: CT IMAGING | Facility: HOSPITAL | Age: 53
Discharge: HOME | End: 2018-10-11
Attending: OTOLARYNGOLOGY
Payer: COMMERCIAL

## 2018-10-11 DIAGNOSIS — J38.6 STENOSIS OF LARYNX: ICD-10-CM

## 2018-10-11 PROCEDURE — 70490 CT SOFT TISSUE NECK W/O DYE: CPT

## 2018-10-19 ENCOUNTER — ANESTHESIA (OUTPATIENT)
Dept: PERIOP | Facility: HOSPITAL | Age: 53
End: 2018-10-19
Payer: COMMERCIAL

## 2018-10-19 ENCOUNTER — ANESTHESIA EVENT (OUTPATIENT)
Dept: PERIOP | Facility: HOSPITAL | Age: 53
End: 2018-10-19
Payer: COMMERCIAL

## 2018-10-19 ENCOUNTER — HOSPITAL ENCOUNTER (OUTPATIENT)
Facility: HOSPITAL | Age: 53
Setting detail: OUTPATIENT SURGERY
Discharge: HOME | End: 2018-10-19
Attending: OTOLARYNGOLOGY | Admitting: OTOLARYNGOLOGY
Payer: COMMERCIAL

## 2018-10-19 VITALS
HEART RATE: 59 BPM | DIASTOLIC BLOOD PRESSURE: 62 MMHG | RESPIRATION RATE: 16 BRPM | SYSTOLIC BLOOD PRESSURE: 96 MMHG | OXYGEN SATURATION: 96 % | WEIGHT: 185.19 LBS | HEIGHT: 58 IN | BODY MASS INDEX: 38.87 KG/M2 | TEMPERATURE: 99.2 F

## 2018-10-19 DIAGNOSIS — R06.02 SHORTNESS OF BREATH: Primary | ICD-10-CM

## 2018-10-19 DIAGNOSIS — J38.6 SUPRAGLOTTIC STENOSIS: ICD-10-CM

## 2018-10-19 DIAGNOSIS — R42 VERTIGO: ICD-10-CM

## 2018-10-19 DIAGNOSIS — K21.9 GASTROESOPHAGEAL REFLUX DISEASE, ESOPHAGITIS PRESENCE NOT SPECIFIED: ICD-10-CM

## 2018-10-19 LAB
ATRIAL RATE: 71 BPM
EXT PREGNANCY TEST URINE: NEGATIVE
P AXIS: 51 DEGREES
PR INTERVAL: 172 MS
QRS AXIS: 21 DEGREES
QRSD INTERVAL: 80 MS
QT INTERVAL: 380 MS
QTC INTERVAL: 412 MS
T WAVE AXIS: 2 DEGREES
VENTRICULAR RATE: 71 BPM

## 2018-10-19 PROCEDURE — 93005 ELECTROCARDIOGRAM TRACING: CPT

## 2018-10-19 PROCEDURE — 81025 URINE PREGNANCY TEST: CPT | Performed by: OTOLARYNGOLOGY

## 2018-10-19 PROCEDURE — 93010 ELECTROCARDIOGRAM REPORT: CPT | Performed by: INTERNAL MEDICINE

## 2018-10-19 RX ORDER — HYDROMORPHONE HCL/PF 1 MG/ML
0.5 SYRINGE (ML) INJECTION
Status: DISCONTINUED | OUTPATIENT
Start: 2018-10-19 | End: 2018-10-19 | Stop reason: HOSPADM

## 2018-10-19 RX ORDER — DEXAMETHASONE SODIUM PHOSPHATE 10 MG/ML
INJECTION, SOLUTION INTRAMUSCULAR; INTRAVENOUS AS NEEDED
Status: DISCONTINUED | OUTPATIENT
Start: 2018-10-19 | End: 2018-10-19 | Stop reason: HOSPADM

## 2018-10-19 RX ORDER — LIDOCAINE HYDROCHLORIDE 10 MG/ML
INJECTION, SOLUTION INFILTRATION; PERINEURAL AS NEEDED
Status: DISCONTINUED | OUTPATIENT
Start: 2018-10-19 | End: 2018-10-19 | Stop reason: SURG

## 2018-10-19 RX ORDER — CEFAZOLIN SODIUM 1 G/3ML
INJECTION, POWDER, FOR SOLUTION INTRAMUSCULAR; INTRAVENOUS AS NEEDED
Status: DISCONTINUED | OUTPATIENT
Start: 2018-10-19 | End: 2018-10-19 | Stop reason: SURG

## 2018-10-19 RX ORDER — LIDOCAINE HYDROCHLORIDE 20 MG/ML
INJECTION, SOLUTION EPIDURAL; INFILTRATION; INTRACAUDAL; PERINEURAL AS NEEDED
Status: DISCONTINUED | OUTPATIENT
Start: 2018-10-19 | End: 2018-10-19 | Stop reason: HOSPADM

## 2018-10-19 RX ORDER — GLYCOPYRROLATE 0.2 MG/ML
INJECTION INTRAMUSCULAR; INTRAVENOUS AS NEEDED
Status: DISCONTINUED | OUTPATIENT
Start: 2018-10-19 | End: 2018-10-19 | Stop reason: SURG

## 2018-10-19 RX ORDER — SODIUM CHLORIDE, SODIUM LACTATE, POTASSIUM CHLORIDE, CALCIUM CHLORIDE 600; 310; 30; 20 MG/100ML; MG/100ML; MG/100ML; MG/100ML
20 INJECTION, SOLUTION INTRAVENOUS CONTINUOUS
Status: DISCONTINUED | OUTPATIENT
Start: 2018-10-19 | End: 2018-10-19 | Stop reason: HOSPADM

## 2018-10-19 RX ORDER — METOCLOPRAMIDE HYDROCHLORIDE 5 MG/ML
10 INJECTION INTRAMUSCULAR; INTRAVENOUS ONCE AS NEEDED
Status: COMPLETED | OUTPATIENT
Start: 2018-10-19 | End: 2018-10-19

## 2018-10-19 RX ORDER — ONDANSETRON 2 MG/ML
INJECTION INTRAMUSCULAR; INTRAVENOUS AS NEEDED
Status: DISCONTINUED | OUTPATIENT
Start: 2018-10-19 | End: 2018-10-19 | Stop reason: SURG

## 2018-10-19 RX ORDER — MAGNESIUM HYDROXIDE 1200 MG/15ML
LIQUID ORAL AS NEEDED
Status: DISCONTINUED | OUTPATIENT
Start: 2018-10-19 | End: 2018-10-19 | Stop reason: HOSPADM

## 2018-10-19 RX ORDER — MIDAZOLAM HYDROCHLORIDE 1 MG/ML
INJECTION INTRAMUSCULAR; INTRAVENOUS AS NEEDED
Status: DISCONTINUED | OUTPATIENT
Start: 2018-10-19 | End: 2018-10-19 | Stop reason: SURG

## 2018-10-19 RX ORDER — METHYLPREDNISOLONE 4 MG/1
TABLET ORAL
Qty: 21 TABLET | Refills: 0 | Status: SHIPPED | OUTPATIENT
Start: 2018-10-19 | End: 2019-10-09 | Stop reason: ALTCHOICE

## 2018-10-19 RX ORDER — FENTANYL CITRATE 50 UG/ML
INJECTION, SOLUTION INTRAMUSCULAR; INTRAVENOUS AS NEEDED
Status: DISCONTINUED | OUTPATIENT
Start: 2018-10-19 | End: 2018-10-19 | Stop reason: SURG

## 2018-10-19 RX ORDER — OMEPRAZOLE 40 MG/1
40 CAPSULE, DELAYED RELEASE ORAL EVERY MORNING
Qty: 30 CAPSULE | Refills: 6 | Status: SHIPPED | OUTPATIENT
Start: 2018-10-19 | End: 2020-02-17 | Stop reason: SDUPTHER

## 2018-10-19 RX ORDER — OXYMETAZOLINE HYDROCHLORIDE 0.05 G/100ML
SPRAY NASAL AS NEEDED
Status: DISCONTINUED | OUTPATIENT
Start: 2018-10-19 | End: 2018-10-19 | Stop reason: HOSPADM

## 2018-10-19 RX ORDER — HYDROCODONE BITARTRATE AND ACETAMINOPHEN 5; 325 MG/1; MG/1
1 TABLET ORAL EVERY 6 HOURS PRN
Qty: 10 TABLET | Refills: 0 | Status: SHIPPED | OUTPATIENT
Start: 2018-10-19 | End: 2018-10-29

## 2018-10-19 RX ORDER — ROCURONIUM BROMIDE 10 MG/ML
INJECTION, SOLUTION INTRAVENOUS AS NEEDED
Status: DISCONTINUED | OUTPATIENT
Start: 2018-10-19 | End: 2018-10-19 | Stop reason: SURG

## 2018-10-19 RX ORDER — SODIUM CHLORIDE, SODIUM LACTATE, POTASSIUM CHLORIDE, CALCIUM CHLORIDE 600; 310; 30; 20 MG/100ML; MG/100ML; MG/100ML; MG/100ML
125 INJECTION, SOLUTION INTRAVENOUS CONTINUOUS
Status: DISCONTINUED | OUTPATIENT
Start: 2018-10-19 | End: 2018-10-19 | Stop reason: HOSPADM

## 2018-10-19 RX ORDER — CLINDAMYCIN HYDROCHLORIDE 150 MG/1
150 CAPSULE ORAL EVERY 8 HOURS SCHEDULED
Qty: 15 CAPSULE | Refills: 0 | Status: SHIPPED | OUTPATIENT
Start: 2018-10-19 | End: 2018-10-24

## 2018-10-19 RX ORDER — FAMOTIDINE 40 MG/1
40 TABLET, FILM COATED ORAL
Qty: 30 TABLET | Refills: 6 | Status: SHIPPED | OUTPATIENT
Start: 2018-10-19 | End: 2019-12-30

## 2018-10-19 RX ORDER — ALBUTEROL SULFATE 90 UG/1
AEROSOL, METERED RESPIRATORY (INHALATION) AS NEEDED
Status: DISCONTINUED | OUTPATIENT
Start: 2018-10-19 | End: 2018-10-19 | Stop reason: SURG

## 2018-10-19 RX ORDER — HYDROCODONE BITARTRATE AND ACETAMINOPHEN 5; 325 MG/1; MG/1
1 TABLET ORAL EVERY 6 HOURS PRN
Status: DISCONTINUED | OUTPATIENT
Start: 2018-10-19 | End: 2018-10-19 | Stop reason: HOSPADM

## 2018-10-19 RX ORDER — ALBUTEROL SULFATE 90 UG/1
2 AEROSOL, METERED RESPIRATORY (INHALATION) EVERY 6 HOURS PRN
Qty: 8.5 G | Refills: 3 | Status: SHIPPED | OUTPATIENT
Start: 2018-10-19 | End: 2019-10-09 | Stop reason: ALTCHOICE

## 2018-10-19 RX ORDER — FENTANYL CITRATE/PF 50 MCG/ML
50 SYRINGE (ML) INJECTION
Status: DISCONTINUED | OUTPATIENT
Start: 2018-10-19 | End: 2018-10-19 | Stop reason: HOSPADM

## 2018-10-19 RX ORDER — PROPOFOL 10 MG/ML
INJECTION, EMULSION INTRAVENOUS AS NEEDED
Status: DISCONTINUED | OUTPATIENT
Start: 2018-10-19 | End: 2018-10-19 | Stop reason: SURG

## 2018-10-19 RX ORDER — PROPOFOL 10 MG/ML
INJECTION, EMULSION INTRAVENOUS CONTINUOUS PRN
Status: DISCONTINUED | OUTPATIENT
Start: 2018-10-19 | End: 2018-10-19 | Stop reason: SURG

## 2018-10-19 RX ADMIN — SODIUM CHLORIDE, SODIUM LACTATE, POTASSIUM CHLORIDE, AND CALCIUM CHLORIDE 125 ML/HR: .6; .31; .03; .02 INJECTION, SOLUTION INTRAVENOUS at 13:58

## 2018-10-19 RX ADMIN — NEOSTIGMINE METHYLSULFATE 3 MG: 1 INJECTION, SOLUTION INTRAMUSCULAR; INTRAVENOUS; SUBCUTANEOUS at 13:02

## 2018-10-19 RX ADMIN — ALBUTEROL SULFATE 6 PUFF: 90 AEROSOL, METERED RESPIRATORY (INHALATION) at 13:02

## 2018-10-19 RX ADMIN — LIDOCAINE HYDROCHLORIDE 50 MG: 10 INJECTION, SOLUTION INFILTRATION; PERINEURAL at 10:41

## 2018-10-19 RX ADMIN — FENTANYL CITRATE 50 MCG: 50 INJECTION, SOLUTION INTRAMUSCULAR; INTRAVENOUS at 13:50

## 2018-10-19 RX ADMIN — PROPOFOL 50 MG: 10 INJECTION, EMULSION INTRAVENOUS at 11:37

## 2018-10-19 RX ADMIN — HYDROCODONE BITARTRATE AND ACETAMINOPHEN 1 TABLET: 5; 325 TABLET ORAL at 19:04

## 2018-10-19 RX ADMIN — MIDAZOLAM 2 MG: 1 INJECTION INTRAMUSCULAR; INTRAVENOUS at 10:33

## 2018-10-19 RX ADMIN — PROPOFOL 100 MCG/KG/MIN: 10 INJECTION, EMULSION INTRAVENOUS at 10:45

## 2018-10-19 RX ADMIN — PROPOFOL 200 MG: 10 INJECTION, EMULSION INTRAVENOUS at 10:41

## 2018-10-19 RX ADMIN — GLYCOPYRROLATE 0.4 MG: 0.2 INJECTION, SOLUTION INTRAMUSCULAR; INTRAVENOUS at 13:02

## 2018-10-19 RX ADMIN — METOCLOPRAMIDE 10 MG: 5 INJECTION, SOLUTION INTRAMUSCULAR; INTRAVENOUS at 13:32

## 2018-10-19 RX ADMIN — FENTANYL CITRATE 50 MCG: 50 INJECTION, SOLUTION INTRAMUSCULAR; INTRAVENOUS at 13:32

## 2018-10-19 RX ADMIN — DEXAMETHASONE SODIUM PHOSPHATE 10 MG: 10 INJECTION INTRAMUSCULAR; INTRAVENOUS at 10:41

## 2018-10-19 RX ADMIN — Medication 0.2 MCG/KG/MIN: at 10:46

## 2018-10-19 RX ADMIN — ROCURONIUM BROMIDE 40 MG: 10 INJECTION INTRAVENOUS at 10:41

## 2018-10-19 RX ADMIN — ALBUTEROL SULFATE 6 PUFF: 90 AEROSOL, METERED RESPIRATORY (INHALATION) at 10:45

## 2018-10-19 RX ADMIN — CEFAZOLIN 2000 MG: 1 INJECTION, POWDER, FOR SOLUTION INTRAVENOUS at 10:42

## 2018-10-19 RX ADMIN — SODIUM CHLORIDE, SODIUM LACTATE, POTASSIUM CHLORIDE, AND CALCIUM CHLORIDE 20 ML/HR: .6; .31; .03; .02 INJECTION, SOLUTION INTRAVENOUS at 10:16

## 2018-10-19 RX ADMIN — PROPOFOL 50 MG: 10 INJECTION, EMULSION INTRAVENOUS at 12:17

## 2018-10-19 RX ADMIN — ONDANSETRON 4 MG: 2 INJECTION INTRAMUSCULAR; INTRAVENOUS at 10:41

## 2018-10-19 RX ADMIN — FENTANYL CITRATE 50 MCG: 50 INJECTION, SOLUTION INTRAMUSCULAR; INTRAVENOUS at 10:41

## 2018-10-19 NOTE — INTERIM OP NOTE
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]  Dyspnea    Procedure(s) (LRB):  MICRO DIRECT LARYNGOSCOPY WITH EXCISION LARYNGEAL SCAR/ SUPRAGLOTTOPLASTY   RIGID BRONCHOSCOPY  INJECTION of STEROID to LARYNX    Surgeon(s) and Role:     * Eber Mcdowell MD - Primary    Specimens:  * No specimens in log *    Estimated Blood Loss:   Minimal    Anesthesia Type:   General     Findings:   Scar tissue involving the epiglottis (laryngeal surface, AE folds) with tethering of the arytenoids against the epiglottis  The epiglottis was abnormally shaped and positioned  Laryngeal airway at level of the false folds and true vocal folds was normal appearing  No tracheal pathology        Complications:   None    SIGNATURE: Eber Mcdowell MD   DATE: 2018   TIME: 10:37 AM

## 2018-10-19 NOTE — ANESTHESIA PREPROCEDURE EVALUATION
Review of Systems/Medical History  Patient summary reviewed  Chart reviewed  No history of anesthetic complications     Cardiovascular  Exercise tolerance (METS): >4,     Pulmonary  Shortness of breath,        GI/Hepatic            Endo/Other    Obesity    GYN       Hematology   Musculoskeletal  Back pain , cervical pain,        Neurology    Paresthesias,    Psychology           Physical Exam    Airway    Mallampati score: II  TM Distance: >3 FB  Neck ROM: limited     Dental   No notable dental hx     Cardiovascular      Pulmonary      Other Findings        Anesthesia Plan  ASA Score- 3     Anesthesia Type- general with ASA Monitors  Additional Monitors:   Airway Plan: ETT  Comment: Despite stridor video laryngoscopy provided by Dr Nessa Samson shows patent larynx and CT normal  Pt has b/l cervical disc dz with paresthesias in b/l arms, will proceed with Marleni Fleming for intubation        Plan Factors-    Induction- intravenous  Postoperative Plan- Plan for postoperative opioid use  Planned trial extubation    Informed Consent- Anesthetic plan and risks discussed with patient and spouse  I personally reviewed this patient with the CRNA  Discussed and agreed on the Anesthesia Plan with the CRNA  Lebron Cortez

## 2018-10-19 NOTE — OP NOTE
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]  Dyspnea    Post-Op Diagnosis Codes:  SAME    Procedure(s) (LRB):  MICRO DIRECT LARYNGOSCOPY WITH EXCISION LARYNGEAL SCAR (SUPRAGLOTTOPLASTY)   RIGID BRONCHOSCOPY  INJECTION of STEROID to LARYNX    Specimen(s):  * No specimens in log *    Estimated Blood Loss:   Minimal    Drains:   none    Anesthesia Type:   General    Operative Indications:  Supraglottic scar affecting arytenoids/epiglottis suspected from severe laryngitis in her 20's  Operative Findings:  Scar tissue involving the epiglottis (laryngeal surface, AE folds) with tethering of the arytenoids against the epiglottis  The epiglottis was abnormally shaped and positioned  Laryngeal airway at level of the false folds and true vocal folds was normal appearing  No tracheal pathology to the mainstem bronchi  Complications:   None    Procedure and Technique:  After obtaining informed consent, patient was taken to the operating room by the anesthesia team   Patient was placed in the supine position on the operating room table  Time out was performed to confirm patient's name, ID, and procedure  Patient was intubated with size 5-0 ETT and turned 90 degrees  Eyes were protected with tape, upper teeth were protected with tooth guard  Vallecula laryngoscope (Mere) was used to obtain view of the larynx and suspended on the Johnson stand  Views were obtained with 0 and 70 deg darby anjana telescopes and microscope  Above findings were noted  Scar along the laryngeal surface of the epiglottis was removed with coblator laryngeal wand  Attention was then turned to the supraglottoplasty  The coblator was used to divide scar tissue/mucosa tethering the arytenoids to the epiglottis    The left side was more tethered than the right  Once completed, Decadron was injected into the areas of tissue removal with Jayesh needle  Oxymetazoline soaked pledgets were used for hemostasis  Upon completion, the vocal folds were sprayed with 2% lidocaine and excess suctioned  The laryngoscope and tooth guard were then removed and care of patient was returned to anesthesia for extubation  Patient was then taken to the postoperative anesthesia care unit for recovery       I was present for the entire procedure    Patient Disposition:  PACU     SIGNATURE: Malu López MD  DATE: October 19, 2018  TIME: 10:37 AM

## 2018-10-19 NOTE — DISCHARGE INSTRUCTIONS
Decrease voice use for the next 2 days  Avoid straining, shouting, heavy lifting    Sniff in through nose 10 times every hour while awake - this will pull the vocal folds apart to reduce scar formation    Resume previous diet    Tylenol, Ibuprofen or Rx hydrocodone for pain; If cough, you may use OTC cough suppressant or hydrocodone Rx  Take reflux medicines as prescribed    Take steroid and antibiotic as prescribed    You might have asthma/reactive airway - albuterol was prescribed to be used as needed for wheeze or shortness of breath  We can investigate this further at your follow up appointment      If shortness of breath, increased pain, bleeding, worsening swallow, notify ENT or go to nearest ER    Follow up with Dr Marbella Lynn in 1 week

## 2018-10-19 NOTE — ANESTHESIA POSTPROCEDURE EVALUATION
Post-Op Assessment Note      CV Status:  Stable    Mental Status:  Alert    Hydration Status:  Euvolemic    PONV Controlled:  Controlled    Airway Patency:  Patent    Post Op Vitals Reviewed: Yes          Staff: CRNA       Comments: PT ABLE TO SPEAK           /85 (10/19/18 1325)    Temp      Pulse 64 (10/19/18 1325)   Resp 16 (10/19/18 1325)    SpO2 100 % (10/19/18 1325)

## 2019-03-03 PROBLEM — K21.9 GASTROESOPHAGEAL REFLUX DISEASE: Status: ACTIVE | Noted: 2019-03-03

## 2019-03-03 PROBLEM — R49.0 DYSPHONIA: Status: ACTIVE | Noted: 2019-03-03

## 2019-03-03 PROBLEM — J04.0 REFLUX LARYNGITIS: Status: ACTIVE | Noted: 2019-03-03

## 2019-03-03 PROBLEM — K21.9 REFLUX LARYNGITIS: Status: ACTIVE | Noted: 2019-03-03

## 2019-03-03 PROBLEM — R06.09 DYSPNEA ON EXERTION: Status: ACTIVE | Noted: 2019-03-03

## 2019-03-03 PROBLEM — H90.A31 MIXED CONDUCTIVE AND SENSORINEURAL HEARING LOSS OF RIGHT EAR WITH RESTRICTED HEARING OF LEFT EAR: Status: ACTIVE | Noted: 2019-03-03

## 2019-03-03 PROBLEM — R05.9 COUGH: Status: ACTIVE | Noted: 2019-03-03

## 2019-03-03 PROBLEM — H72.91 PERFORATION OF RIGHT TYMPANIC MEMBRANE: Status: ACTIVE | Noted: 2019-03-03

## 2019-03-03 PROBLEM — J38.6 LARYNGEAL STENOSIS: Status: ACTIVE | Noted: 2019-03-03

## 2019-03-03 PROBLEM — R06.00 DYSPNEA ON EXERTION: Status: ACTIVE | Noted: 2019-03-03

## 2019-09-27 ENCOUNTER — OFFICE VISIT (OUTPATIENT)
Dept: FAMILY MEDICINE CLINIC | Facility: CLINIC | Age: 54
End: 2019-09-27
Payer: COMMERCIAL

## 2019-09-27 VITALS
TEMPERATURE: 98.1 F | BODY MASS INDEX: 38.78 KG/M2 | SYSTOLIC BLOOD PRESSURE: 126 MMHG | WEIGHT: 192 LBS | HEART RATE: 92 BPM | OXYGEN SATURATION: 96 % | DIASTOLIC BLOOD PRESSURE: 80 MMHG

## 2019-09-27 DIAGNOSIS — M67.911 ROTATOR CUFF DISORDER, RIGHT: Primary | ICD-10-CM

## 2019-09-27 DIAGNOSIS — Z12.11 SCREENING FOR COLON CANCER: ICD-10-CM

## 2019-09-27 DIAGNOSIS — Z12.39 SCREENING FOR BREAST CANCER: ICD-10-CM

## 2019-09-27 DIAGNOSIS — Z28.21 INFLUENZA VACCINATION DECLINED: ICD-10-CM

## 2019-09-27 PROCEDURE — 99213 OFFICE O/P EST LOW 20 MIN: CPT | Performed by: FAMILY MEDICINE

## 2019-09-27 PROCEDURE — 96372 THER/PROPH/DIAG INJ SC/IM: CPT

## 2019-09-27 RX ORDER — CYCLOBENZAPRINE HCL 10 MG
10 TABLET ORAL
Qty: 20 TABLET | Refills: 0 | Status: SHIPPED | OUTPATIENT
Start: 2019-09-27 | End: 2019-10-16 | Stop reason: SDUPTHER

## 2019-09-27 RX ORDER — NAPROXEN 500 MG/1
500 TABLET ORAL 2 TIMES DAILY WITH MEALS
Qty: 20 TABLET | Refills: 0 | Status: SHIPPED | OUTPATIENT
Start: 2019-09-27 | End: 2019-10-09 | Stop reason: SDUPTHER

## 2019-09-27 RX ORDER — METHYLPREDNISOLONE ACETATE 40 MG/ML
40 INJECTION, SUSPENSION INTRA-ARTICULAR; INTRALESIONAL; INTRAMUSCULAR; SOFT TISSUE ONCE
Status: COMPLETED | OUTPATIENT
Start: 2019-09-27 | End: 2019-09-27

## 2019-09-27 RX ADMIN — METHYLPREDNISOLONE ACETATE 40 MG: 40 INJECTION, SUSPENSION INTRA-ARTICULAR; INTRALESIONAL; INTRAMUSCULAR; SOFT TISSUE at 17:24

## 2019-10-03 NOTE — PROGRESS NOTES
Assessment/Plan:    No problem-specific Assessment & Plan notes found for this encounter  Diagnoses and all orders for this visit:    Rotator cuff disorder, right  -     Ambulatory referral to Pain Management; Future  -     naproxen (EC NAPROSYN) 500 MG EC tablet; Take 1 tablet (500 mg total) by mouth 2 (two) times a day with meals  -     cyclobenzaprine (FLEXERIL) 10 mg tablet; Take 1 tablet (10 mg total) by mouth daily at bedtime  -     methylPREDNISolone acetate (DEPO-MEDROL) injection 40 mg    Screening for breast cancer  -     Mammo screening bilateral w 3d & cad; Future    Screening for colon cancer  -     Ambulatory referral to Gastroenterology; Future    Influenza vaccination declined  -     influenza vaccine, [id number], quadrivalent, recombinant, PF, 0 5 mL, for patients 18 yr+ (FLUBLOK)          Subjective:      Patient ID: Nadege Varma is a 47 y o  female      Here for severe right shoulder pain   She has had neck issues in the past with MRI showing disc ds she never fu with my recommendation to go to ortho  She has no knon trauma but was unable to lay on right side and  is my patient as well and stated she was horribly uncomfortable  He has to       The following portions of the patient's history were reviewed and updated as appropriate: current medications, past family history, past medical history, past social history, past surgical history and problem list     Review of Systems      Objective:      /80 (BP Location: Left arm, Patient Position: Sitting, Cuff Size: Large)   Pulse 92   Temp 98 1 °F (36 7 °C) (Tympanic)   Wt 87 1 kg (192 lb)   SpO2 96%   BMI 38 78 kg/m²          Physical Exam    General-patient is in moderate distress with pain  Exam on active range of motion shows significant decrease in ab duction, flexion and extension at the right shoulder  She is completely unable to internal internally rotate her right shoulder  She has decreased range of motion of her cervical spine in side bending to the right into the left decreased rotation to the left and flexion extension seems to be within normal limits  She has no numbness or tingling or radiculopathy down the shoulder  She has point tenderness in the bicipital tendon groove

## 2019-10-09 ENCOUNTER — OFFICE VISIT (OUTPATIENT)
Dept: FAMILY MEDICINE CLINIC | Facility: CLINIC | Age: 54
End: 2019-10-09
Payer: COMMERCIAL

## 2019-10-09 VITALS
SYSTOLIC BLOOD PRESSURE: 110 MMHG | RESPIRATION RATE: 16 BRPM | BODY MASS INDEX: 38.58 KG/M2 | HEART RATE: 85 BPM | OXYGEN SATURATION: 97 % | DIASTOLIC BLOOD PRESSURE: 70 MMHG | WEIGHT: 191 LBS

## 2019-10-09 DIAGNOSIS — M54.2 CERVICALGIA: ICD-10-CM

## 2019-10-09 DIAGNOSIS — Z00.00 PHYSICAL EXAM: Primary | ICD-10-CM

## 2019-10-09 DIAGNOSIS — M67.911 ROTATOR CUFF DISORDER, RIGHT: ICD-10-CM

## 2019-10-09 PROCEDURE — 99396 PREV VISIT EST AGE 40-64: CPT | Performed by: FAMILY MEDICINE

## 2019-10-09 RX ORDER — NAPROXEN 500 MG/1
500 TABLET ORAL 2 TIMES DAILY WITH MEALS
Qty: 20 TABLET | Refills: 0 | Status: SHIPPED | OUTPATIENT
Start: 2019-10-09 | End: 2019-10-29 | Stop reason: SDUPTHER

## 2019-10-09 NOTE — PROGRESS NOTES
Assessment/Plan:    No problem-specific Assessment & Plan notes found for this encounter  Diagnoses and all orders for this visit:    Physical exam  -     Lipid panel; Future  -     CBC and differential; Future  -     Comprehensive metabolic panel; Future    Rotator cuff disorder, right  -     naproxen (EC NAPROSYN) 500 MG EC tablet; Take 1 tablet (500 mg total) by mouth 2 (two) times a day with meals    Cervicalgia          Subjective:      Patient ID: Kari Ledesma is a 47 y o  female  Patient is here for her physical I did see her couple of weeks ago with some shoulder pain especially her right shoulder she is considerably better per her  and her she is not crying at night and she is not screaming in pain she would like a refill on her Naprosyn    she does have an appointment with pain management on October 26th    As such her menses go she has not gotten    In about 3-4 years          The following portions of the patient's history were reviewed and updated as appropriate: current medications, past family history, past medical history, past social history, past surgical history and problem list     Review of Systems   Constitutional: Negative  HENT: Negative  Eyes: Negative  Cardiovascular: Negative  Endocrine: Negative  Genitourinary: Negative  Allergic/Immunologic: Negative  Neurological: Negative  Hematological: Negative  Objective:      /70   Pulse 85   Resp 16   Wt 86 6 kg (191 lb)   SpO2 97%   BMI 38 58 kg/m²          Physical Exam   Constitutional: She appears well-developed and well-nourished  HENT:   Head: Normocephalic and atraumatic  Right Ear: External ear normal    Left Ear: External ear normal    Mouth/Throat: Oropharynx is clear and moist    Eyes: Pupils are equal, round, and reactive to light  EOM are normal  Right eye exhibits no discharge  Left eye exhibits no discharge  No scleral icterus  Neck: No JVD present   No tracheal deviation present  No thyromegaly present  Cardiovascular: Normal rate, regular rhythm, normal heart sounds and intact distal pulses  Exam reveals no gallop and no friction rub  No murmur heard  Pulmonary/Chest: Effort normal and breath sounds normal    Abdominal: Soft  Bowel sounds are normal  She exhibits no distension and no mass  There is no tenderness  There is no rebound and no guarding  No hernia  She does have at times some crampy pain in the right lower quadrant but it is never at anything that last long and does not even happen every day she can go months in between I told her for comes that he consistency to please let me know   Lymphadenopathy:     She has no cervical adenopathy

## 2019-10-16 DIAGNOSIS — M67.911 ROTATOR CUFF DISORDER, RIGHT: ICD-10-CM

## 2019-10-16 RX ORDER — CYCLOBENZAPRINE HCL 10 MG
TABLET ORAL
Qty: 30 TABLET | Refills: 0 | Status: SHIPPED | OUTPATIENT
Start: 2019-10-16 | End: 2020-07-17 | Stop reason: ALTCHOICE

## 2019-10-26 ENCOUNTER — HOSPITAL ENCOUNTER (OUTPATIENT)
Dept: RADIOLOGY | Facility: HOSPITAL | Age: 54
Discharge: HOME | End: 2019-10-26
Attending: FAMILY MEDICINE
Payer: COMMERCIAL

## 2019-10-26 VITALS — BODY MASS INDEX: 38.58 KG/M2 | HEIGHT: 59 IN

## 2019-10-26 DIAGNOSIS — Z12.39 SCREENING FOR BREAST CANCER: ICD-10-CM

## 2019-10-26 PROCEDURE — 77063 BREAST TOMOSYNTHESIS BI: CPT

## 2019-10-26 PROCEDURE — 77067 SCR MAMMO BI INCL CAD: CPT

## 2019-10-28 ENCOUNTER — TELEPHONE (OUTPATIENT)
Dept: RADIOLOGY | Facility: HOSPITAL | Age: 54
End: 2019-10-28

## 2019-10-29 ENCOUNTER — CONSULT (OUTPATIENT)
Dept: PAIN MEDICINE | Facility: CLINIC | Age: 54
End: 2019-10-29
Payer: COMMERCIAL

## 2019-10-29 ENCOUNTER — TELEPHONE (OUTPATIENT)
Dept: RADIOLOGY | Facility: HOSPITAL | Age: 54
End: 2019-10-29

## 2019-10-29 VITALS
WEIGHT: 192.8 LBS | BODY MASS INDEX: 38.87 KG/M2 | HEIGHT: 59 IN | SYSTOLIC BLOOD PRESSURE: 132 MMHG | DIASTOLIC BLOOD PRESSURE: 82 MMHG | HEART RATE: 88 BPM

## 2019-10-29 DIAGNOSIS — M67.911 ROTATOR CUFF DISORDER, RIGHT: ICD-10-CM

## 2019-10-29 DIAGNOSIS — G89.29 CHRONIC PAIN OF BOTH SHOULDERS: ICD-10-CM

## 2019-10-29 DIAGNOSIS — M25.512 CHRONIC PAIN OF BOTH SHOULDERS: ICD-10-CM

## 2019-10-29 DIAGNOSIS — M54.50 CHRONIC MIDLINE LOW BACK PAIN WITHOUT SCIATICA: ICD-10-CM

## 2019-10-29 DIAGNOSIS — G89.4 CHRONIC PAIN SYNDROME: Primary | ICD-10-CM

## 2019-10-29 DIAGNOSIS — G89.29 CHRONIC MIDLINE LOW BACK PAIN WITHOUT SCIATICA: ICD-10-CM

## 2019-10-29 DIAGNOSIS — M25.511 CHRONIC PAIN OF BOTH SHOULDERS: ICD-10-CM

## 2019-10-29 DIAGNOSIS — M54.2 NECK PAIN: ICD-10-CM

## 2019-10-29 LAB
ALBUMIN SERPL-MCNC: 4.3 G/DL (ref 3.5–5.5)
ALBUMIN/GLOB SERPL: 1.7 {RATIO} (ref 1.2–2.2)
ALP SERPL-CCNC: 73 IU/L (ref 39–117)
ALT SERPL-CCNC: 18 IU/L (ref 0–32)
AST SERPL-CCNC: 19 IU/L (ref 0–40)
BASOPHILS # BLD AUTO: 0 X10E3/UL (ref 0–0.2)
BASOPHILS NFR BLD AUTO: 1 %
BILIRUB SERPL-MCNC: 0.4 MG/DL (ref 0–1.2)
BUN SERPL-MCNC: 13 MG/DL (ref 6–24)
BUN/CREAT SERPL: 17 (ref 9–23)
CALCIUM SERPL-MCNC: 9.2 MG/DL (ref 8.7–10.2)
CHLORIDE SERPL-SCNC: 102 MMOL/L (ref 96–106)
CHOLEST SERPL-MCNC: 211 MG/DL (ref 100–199)
CO2 SERPL-SCNC: 23 MMOL/L (ref 20–29)
CREAT SERPL-MCNC: 0.78 MG/DL (ref 0.57–1)
EOSINOPHIL # BLD AUTO: 0.2 X10E3/UL (ref 0–0.4)
EOSINOPHIL NFR BLD AUTO: 3 %
ERYTHROCYTE [DISTWIDTH] IN BLOOD BY AUTOMATED COUNT: 13.6 % (ref 12.3–15.4)
GLOBULIN SER-MCNC: 2.6 G/DL (ref 1.5–4.5)
GLUCOSE SERPL-MCNC: 99 MG/DL (ref 65–99)
HCT VFR BLD AUTO: 37.3 % (ref 34–46.6)
HDLC SERPL-MCNC: 53 MG/DL
HGB BLD-MCNC: 12.9 G/DL (ref 11.1–15.9)
IMM GRANULOCYTES # BLD: 0 X10E3/UL (ref 0–0.1)
IMM GRANULOCYTES NFR BLD: 0 %
LDLC SERPL CALC-MCNC: 121 MG/DL (ref 0–99)
LYMPHOCYTES # BLD AUTO: 2.2 X10E3/UL (ref 0.7–3.1)
LYMPHOCYTES NFR BLD AUTO: 37 %
MCH RBC QN AUTO: 29.6 PG (ref 26.6–33)
MCHC RBC AUTO-ENTMCNC: 34.6 G/DL (ref 31.5–35.7)
MCV RBC AUTO: 86 FL (ref 79–97)
MONOCYTES # BLD AUTO: 0.3 X10E3/UL (ref 0.1–0.9)
MONOCYTES NFR BLD AUTO: 5 %
NEUTROPHILS # BLD AUTO: 3.2 X10E3/UL (ref 1.4–7)
NEUTROPHILS NFR BLD AUTO: 54 %
PLATELET # BLD AUTO: 270 X10E3/UL (ref 150–450)
POTASSIUM SERPL-SCNC: 4.5 MMOL/L (ref 3.5–5.2)
PROT SERPL-MCNC: 6.9 G/DL (ref 6–8.5)
RBC # BLD AUTO: 4.36 X10E6/UL (ref 3.77–5.28)
SL AMB EGFR AFRICAN AMERICAN: 100 ML/MIN/1.73
SL AMB EGFR NON AFRICAN AMERICAN: 86 ML/MIN/1.73
SL AMB VLDL CHOLESTEROL CALC: 37 MG/DL (ref 5–40)
SODIUM SERPL-SCNC: 140 MMOL/L (ref 134–144)
TRIGL SERPL-MCNC: 183 MG/DL (ref 0–149)
WBC # BLD AUTO: 6 X10E3/UL (ref 3.4–10.8)

## 2019-10-29 PROCEDURE — 99204 OFFICE O/P NEW MOD 45 MIN: CPT | Performed by: ANESTHESIOLOGY

## 2019-10-29 NOTE — PROGRESS NOTES
Pain Medicine Follow-Up Note    Assessment:  1  Chronic pain syndrome    2  Neck pain    3  Chronic pain of both shoulders    4  Chronic midline low back pain without sciatica        Plan:  Orders Placed This Encounter   Procedures    Ambulatory referral to Orthopedic Surgery     Standing Status:   Future     Standing Expiration Date:   10/29/2020     Referral Priority:   Routine     Referral Type:   Consult - AMB     Referral Reason:   Specialty Services Required     Referred to Provider:   Imelda Garay DO     Requested Specialty:   Orthopedic Surgery     Number of Visits Requested:   1     Expiration Date:   10/29/2020    Ambulatory referral to Physical Therapy     Standing Status:   Future     Standing Expiration Date:   10/29/2020     Referral Priority:   Routine     Referral Type:   Physical Therapy     Referral Reason:   Specialty Services Required     Requested Specialty:   Physical Therapy     Number of Visits Requested:   1     Expiration Date:   10/29/2020     My impressions and treatment recommendations were discussed in detail with the patient who verbalized understanding and had no further questions  The patient is currently complaining of neck pain, bilateral shoulder pain, and low back pain as her primary pain complaints  She has not yet undergone a course of physical therapy, so I felt it reasonable to start her on physical therapy 2-3 times per week for 4-6 weeks  She is also complaining of bilateral shoulder pain and states that her symptoms started with bilateral shoulder pain before be given a steroid injection by Dr Ashok Chavez  As such, I would like to rule out a primary shoulder problem  I have referred the patient to see Dr Jama Young in this regard  If the patient does not respond to physical therapy, I will consider having patient undergo a MRI of the cervical spine and possibly a MRI of the lumbar spine as well      Once the patient completes her physical therapy course, we will discuss further treatment options  Discharge instructions were provided  I personally saw and examined the patient and I agree with the above discussed plan of care  History of Present Illness:    Daphnie Rudolph is a 47 y o  female who presents to [facility] for interval re-evaluation of the above stated pain complaints  The patient has a past medical and chronic pain history as outlined in the assessment section  She was referred by Dr Jacquelyn Chaudhary  At today's office visit, the patient's pain score is 8/10 on the verbal numerical pain rating scale  The patient describes her pain as moderate to severe and nearly constant in nature  There is no typical pattern to her pain  She describes her pain as "cramping, shooting, numbness, sharp, pins and needles, pressure like, and throbbing    The patient reports weakness in her upper and lower extremities  He states that lying down, standing, bending, sitting, walking, exercise, and coughing/sneezing increases pain  Heat/ice treatment and TENS unit therapy provides moderate pain relief  She does report that acetaminophen over-the-counter does provide her some pain relief  Other than as stated above, the patient denies any interval changes in medications, medical condition, mental condition, symptoms, or allergies since the last office visit  Review of Systems:    Review of Systems   Constitutional: Positive for unexpected weight change  Negative for fever  HENT: Negative for trouble swallowing  Eyes: Negative for visual disturbance  Respiratory: Positive for shortness of breath  Negative for wheezing  Cardiovascular: Positive for leg swelling  Negative for chest pain and palpitations  Gastrointestinal: Positive for nausea  Negative for constipation, diarrhea and vomiting  Endocrine: Positive for polydipsia and polyuria  Negative for cold intolerance and heat intolerance     Genitourinary: Negative for difficulty urinating and frequency  Musculoskeletal: Positive for arthralgias  Negative for gait problem, joint swelling and myalgias  Skin: Negative for rash  Neurological: Positive for dizziness, numbness and headaches  Negative for seizures, syncope and weakness  Hematological: Does not bruise/bleed easily  Psychiatric/Behavioral: Negative for dysphoric mood  All other systems reviewed and are negative  Patient Active Problem List   Diagnosis    Dyspnea on exertion    Dysphonia    Laryngeal stenosis    Cough    Gastroesophageal reflux disease    Reflux laryngitis    Mixed conductive and sensorineural hearing loss of right ear with restricted hearing of left ear    Perforation of right tympanic membrane       Past Medical History:   Diagnosis Date    Adult-onset obesity     Allergic rhinitis     Disc displacement, cervical     Retinal disorders     last assessed 6/30/14    Tuberculosis, laryngeal     Vitamin D deficiency disease     last assessed 6/30/14       Past Surgical History:   Procedure Laterality Date    BREAST SURGERY      FL BRONCHOSCOPY,DIAGNOSTIC N/A 10/19/2018    Procedure: BRONCHOSCOPY RIGID;  Surgeon: Zeb العراقي MD;  [facility];  Service: ENT    FL LARYNGOSCOPY,DIRCT,OP [address] N/A 10/19/2018    Procedure: EXCISION LARYNGEAL SCAR; SUPRAGLOTTOPLASTY  POSSIBLE JET VENTILATION; CO2 LASER; COBLATOR;  Surgeon: Zeb العراقي MD;  [facility];  Service: ENT    FL LARYNGOSCOPY,DIRCT,OP,BIOPSY N/A 10/19/2018    Procedure: MICRO DIRECT LARYNGOSCOPY;  Surgeon: Zeb العراقي MD;  [facility];  Service: ENT       History reviewed  No pertinent family history      Social History     Occupational History    Not on file   Tobacco Use    Smoking status: Never Smoker    Smokeless tobacco: Never Used   Substance and Sexual Activity    Alcohol use: No    Drug use: No    Sexual activity: Never     Birth control/protection: Post-menopausal Current Outpatient Medications:     cyclobenzaprine (FLEXERIL) 10 mg tablet, TAKE 1 TABLET BY MOUTH DAILY AT BEDTIME, Disp: 30 tablet, Rfl: 0    famotidine (PEPCID) 40 MG tablet, Take 1 tablet (40 mg total) by mouth daily at bedtime, Disp: 30 tablet, Rfl: 6    naproxen (EC NAPROSYN) 500 MG EC tablet, Take 1 tablet (500 mg total) by mouth 2 (two) times a day with meals, Disp: 20 tablet, Rfl: 0    omeprazole (PriLOSEC) 40 MG capsule, Take 1 capsule (40 mg total) by mouth every morning, Disp: 30 capsule, Rfl: 6    Allergies   Allergen Reactions    Pollen Extract        Physical Exam:    /82   Pulse 88   Ht 4' 11" (1 499 m)   Wt 87 5 kg (192 lb 12 8 oz)   BMI 38 94 kg/m²     Constitutional:obese  Eyes:anicteric  HEENT:grossly intact  Neck:supple, symmetric, trachea midline and no masses   Pulmonary:even and unlabored  Cardiovascular:No edema or pitting edema present  Skin:Normal without rashes or lesions and well hydrated  Psychiatric:Mood and affect appropriate  Neurologic:Cranial Nerves II-XII grossly intact  Musculoskeletal:normal     Cervical Spine Exam    Appearance:  Normal lordosis  Palpation/Tenderness:  no tenderness or spasm  Sensory:  no sensory deficits noted  Range of Motion:  Flexion:  No limitation  with pain  Extension:  No limitation  with pain  Lateral Flexion - Left:  No limitation  with pain  Lateral Flexion - Right:  No limitation  with pain  Rotation - Left:  No limitation  with pain  Rotation - Right:  No limitation  with pain  Motor Strength:  Left Arm Flexion  5/5  Left Arm Extension  5/5  Right Arm Flexion  5/5  Right Arm Extension  5/5  Left Wrist Flexion  5/5  Left Wrist Extension  5/5  Left Finger Abduction  5/5  Right Finger Abduction  5/5  Left Pincer Grasp  5/5  Right Pincer Grasp  5/5  Left    5/5  Right   5/5  Reflexes:  Left Biceps:  2+   Right Biceps:  2+   Left Brachioradialis:  2+   Right Brachioradialis:  2+   Left Triceps:  2+   Right Triceps:  2+ Special Tests:  Left Spurlings:  negative  Right Spurlings  negative      Orders Placed This Encounter   Procedures    Ambulatory referral to Orthopedic Surgery    Ambulatory referral to Physical Therapy

## 2019-11-04 ENCOUNTER — TELEPHONE (OUTPATIENT)
Dept: RADIOLOGY | Facility: HOSPITAL | Age: 54
End: 2019-11-04

## 2019-11-05 ENCOUNTER — APPOINTMENT (OUTPATIENT)
Dept: RADIOLOGY | Facility: CLINIC | Age: 54
End: 2019-11-05
Payer: COMMERCIAL

## 2019-11-05 ENCOUNTER — OFFICE VISIT (OUTPATIENT)
Dept: OBGYN CLINIC | Facility: CLINIC | Age: 54
End: 2019-11-05
Payer: COMMERCIAL

## 2019-11-05 VITALS
SYSTOLIC BLOOD PRESSURE: 122 MMHG | BODY MASS INDEX: 38.71 KG/M2 | WEIGHT: 192 LBS | DIASTOLIC BLOOD PRESSURE: 80 MMHG | HEIGHT: 59 IN | HEART RATE: 73 BPM

## 2019-11-05 DIAGNOSIS — M75.31 CALCIFIC TENDINITIS OF RIGHT SHOULDER: Primary | ICD-10-CM

## 2019-11-05 DIAGNOSIS — M25.512 BILATERAL SHOULDER PAIN, UNSPECIFIED CHRONICITY: ICD-10-CM

## 2019-11-05 DIAGNOSIS — M25.511 BILATERAL SHOULDER PAIN, UNSPECIFIED CHRONICITY: ICD-10-CM

## 2019-11-05 DIAGNOSIS — M75.32 CALCIFIC TENDINITIS OF LEFT SHOULDER: ICD-10-CM

## 2019-11-05 DIAGNOSIS — S46.819A STRAIN OF TRAPEZIUS MUSCLE, UNSPECIFIED LATERALITY, INITIAL ENCOUNTER: ICD-10-CM

## 2019-11-05 PROCEDURE — 20610 DRAIN/INJ JOINT/BURSA W/O US: CPT | Performed by: ORTHOPAEDIC SURGERY

## 2019-11-05 PROCEDURE — 99203 OFFICE O/P NEW LOW 30 MIN: CPT | Performed by: ORTHOPAEDIC SURGERY

## 2019-11-05 PROCEDURE — 73030 X-RAY EXAM OF SHOULDER: CPT

## 2019-11-05 RX ORDER — DEXAMETHASONE SODIUM PHOSPHATE 100 MG/10ML
40 INJECTION INTRAMUSCULAR; INTRAVENOUS
Status: COMPLETED | OUTPATIENT
Start: 2019-11-05 | End: 2019-11-05

## 2019-11-05 RX ORDER — LIDOCAINE HYDROCHLORIDE 10 MG/ML
4 INJECTION, SOLUTION INFILTRATION; PERINEURAL
Status: COMPLETED | OUTPATIENT
Start: 2019-11-05 | End: 2019-11-05

## 2019-11-05 RX ADMIN — LIDOCAINE HYDROCHLORIDE 4 ML: 10 INJECTION, SOLUTION INFILTRATION; PERINEURAL at 14:12

## 2019-11-05 RX ADMIN — DEXAMETHASONE SODIUM PHOSPHATE 40 MG: 100 INJECTION INTRAMUSCULAR; INTRAVENOUS at 14:12

## 2019-11-05 NOTE — PROGRESS NOTES
Assessment/Plan:  1  Calcific tendinitis of right shoulder  XR shoulder 2+ vw left    XR shoulder 2+ vw right    Ambulatory referral to Physical Therapy   2  Calcific tendinitis of left shoulder  Ambulatory referral to Physical Therapy   3  Strain of trapezius muscle, unspecified laterality, initial encounter  Ambulatory referral to Physical Therapy       Warden Mejia has bilateral shoulder pain which appears to be related to bilateral calcific tendinitis which is worse on the right than the left  She also has some slight weakness on the right compared to the left  We did elect to provide a right subacromial cortisone injection today which was  We could repeat this injection on the left if the right is beneficial for her  I would also like for her to start physical therapy for both rotator cuff strengthening and treatment for bilateral trapezius pain  Much of her neck and shoulder discomfort is coming from her trapezius region  I agree with Dr Aris Kolb that we should try conservative measures of physical therapy for her neck and shoulders first and we can proceed with workup afterwards if clinically appropriate  She will follow up with me for her shoulders in 6 weeks after physical therapy is complete  Subjective:   Lea Zuniga is a 47 y o  female who presents to the office for evaluation for bilateral shoulder pain  She was recently seen by Dr Aris oKlb for ongoing neck discomfort  She has a history of chronic neck pain and disc bulging on previous MRIs  She was failure weighted by Dr Aris Kolb who felt that her best treatment option would be starting physical therapy as well as referring her to see me for ongoing shoulder pain  She describes bilateral shoulder pain and states that her left started before the right but now the right seems to be more symptomatic  She describes intermittent aching throbbing moderate pain throughout the shoulders    It worsens with lifting any objects or lying on her right side  She feels pain radiate into the trapezius and neck region bilaterally  She does report intermittent symptoms radiating pain down her right arm and is unsure if it is coming from her shoulder her neck  She states that her shoulders felt slightly better after a Depo-Medrol injection at her primary care office  She denies any history of shoulder problems in the past       Review of Systems   Constitutional: Negative for chills, fever and unexpected weight change  HENT: Negative for hearing loss, nosebleeds and sore throat  Eyes: Negative for pain, redness and visual disturbance  Respiratory: Negative for cough, shortness of breath and wheezing  Cardiovascular: Negative for chest pain, palpitations and leg swelling  Gastrointestinal: Negative for abdominal pain, nausea and vomiting  Endocrine: Negative for polydipsia and polyuria  Genitourinary: Negative for dysuria and hematuria  Musculoskeletal:        See HPI   Skin: Negative for rash and wound  Neurological: Negative for dizziness, numbness and headaches  Psychiatric/Behavioral: Negative for decreased concentration and suicidal ideas  The patient is not nervous/anxious            Past Medical History:   Diagnosis Date    Adult-onset obesity     Allergic rhinitis     Disc displacement, cervical     Retinal disorders     last assessed 6/30/14    Tuberculosis, laryngeal     Vitamin D deficiency disease     last assessed 6/30/14       Past Surgical History:   Procedure Laterality Date    BREAST SURGERY      NM BRONCHOSCOPY,DIAGNOSTIC N/A 10/19/2018    Procedure: BRONCHOSCOPY RIGID;  Surgeon: Francisco Melendez MD;  [facility];  Service: ENT    NM LARYNGOSCOPY,DIRCT,OP [facility] N/A 10/19/2018    Procedure: EXCISION LARYNGEAL SCAR; SUPRAGLOTTOPLASTY  POSSIBLE JET VENTILATION; CO2 LASER; COBLATOR;  Surgeon: Francisco Melendez MD;  [facility];  Service: ENT    NM LARYNGOSCOPY,DIRCT,OP,BIOPSY N/A 10/19/2018 Procedure: MICRO DIRECT LARYNGOSCOPY;  Surgeon: Morse Cockayne, MD;  [facility];  Service: ENT       History reviewed  No pertinent family history  Social History     Occupational History    Not on file   Tobacco Use    Smoking status: Never Smoker    Smokeless tobacco: Never Used   Substance and Sexual Activity    Alcohol use: No    Drug use: No    Sexual activity: Never     Birth control/protection: Post-menopausal         Current Outpatient Medications:     cyclobenzaprine (FLEXERIL) 10 mg tablet, TAKE 1 TABLET BY MOUTH DAILY AT BEDTIME, Disp: 30 tablet, Rfl: 0    famotidine (PEPCID) 40 MG tablet, Take 1 tablet (40 mg total) by mouth daily at bedtime, Disp: 30 tablet, Rfl: 6    NAPROXEN  MG EC tablet, TAKE 1 TABLET BY MOUTH TWICE A DAY WITH MEALS, Disp: 20 tablet, Rfl: 0    omeprazole (PriLOSEC) 40 MG capsule, Take 1 capsule (40 mg total) by mouth every morning, Disp: 30 capsule, Rfl: 6    Allergies   Allergen Reactions    Pollen Extract        Objective:  Vitals:    11/05/19 1143   BP: 122/80   Pulse: 73       Right Shoulder Exam     Tenderness   Right shoulder tenderness location: Trapezius  Range of Motion   Active abduction: normal   Passive abduction: normal   Extension: normal   External rotation: normal   Forward flexion: normal   Internal rotation 0 degrees: normal     Muscle Strength   Abduction: 4/5   Internal rotation: 5/5   External rotation: 5/5   Supraspinatus: 4/5   Subscapularis: 5/5   Biceps: 5/5     Tests   Grayson test: positive  Impingement: positive    Other   Erythema: absent  Sensation: normal  Pulse: present    Comments:  Positive empty can test      Left Shoulder Exam     Tenderness   Left shoulder tenderness location: Trapezius      Range of Motion   Active abduction: normal   Passive abduction: normal   Extension: normal   External rotation: normal   Forward flexion: normal   Internal rotation 0 degrees: normal     Muscle Strength   Abduction: 5/5 Internal rotation: 5/5   External rotation: 5/5   Supraspinatus: 5/5   Subscapularis: 5/5   Biceps: 5/5     Tests   Grayson test: negative  Impingement: negative    Other   Erythema: absent  Sensation: normal  Pulse: present     Comments:  Positive empty can test            Physical Exam   Constitutional: She is oriented to person, place, and time  She appears well-developed and well-nourished  HENT:   Head: Normocephalic and atraumatic  Eyes: Pupils are equal, round, and reactive to light  Conjunctivae are normal    Neck: Normal range of motion  Neck supple  Cardiovascular: Normal rate and intact distal pulses  Pulmonary/Chest: Effort normal  No respiratory distress  Musculoskeletal:   As noted in HPI   Neurological: She is alert and oriented to person, place, and time  Skin: Skin is warm and dry  Psychiatric: She has a normal mood and affect  Her behavior is normal    Nursing note and vitals reviewed  I have personally reviewed pertinent films in PACS and my interpretation is as follows: Three-view x-rays of the right shoulder demonstrate calcific tendinitis/bursitis at the level of the greater tuberosity  No evidence of acute fracture  No significant degenerative changes  Three-view x-rays of the left shoulder demonstrate a small amount of calcific tendinitis at the level of the greater tuberosity  No evidence of acute fracture  No significant degenerative changes        Large joint arthrocentesis: R subacromial bursa  Date/Time: 11/5/2019 2:12 PM  Consent given by: patient  Site marked: site marked  Timeout: Immediately prior to procedure a time out was called to verify the correct patient, procedure, equipment, support staff and site/side marked as required   Supporting Documentation  Indications: pain   Procedure Details  Location: shoulder - R subacromial bursa  Preparation: Patient was prepped and draped in the usual sterile fashion  Needle size: 22 G  Ultrasound guidance: no  Approach: posterior  Medications administered: 40 mg dexamethasone 100 mg/10 mL; 4 mL lidocaine 1 %    Patient tolerance: patient tolerated the procedure well with no immediate complications  Dressing:  Sterile dressing applied

## 2019-11-07 ENCOUNTER — TELEPHONE (OUTPATIENT)
Dept: RADIOLOGY | Facility: HOSPITAL | Age: 54
End: 2019-11-07

## 2019-11-07 ENCOUNTER — TELEPHONE (OUTPATIENT)
Dept: FAMILY MEDICINE CLINIC | Facility: CLINIC | Age: 54
End: 2019-11-07

## 2019-11-14 ENCOUNTER — EVALUATION (OUTPATIENT)
Dept: PHYSICAL THERAPY | Facility: CLINIC | Age: 54
End: 2019-11-14
Payer: COMMERCIAL

## 2019-11-14 ENCOUNTER — HOSPITAL ENCOUNTER (OUTPATIENT)
Dept: RADIOLOGY | Facility: HOSPITAL | Age: 54
Discharge: HOME | End: 2019-11-14
Attending: FAMILY MEDICINE
Payer: COMMERCIAL

## 2019-11-14 DIAGNOSIS — M75.31 CALCIFIC TENDINITIS OF RIGHT SHOULDER: Primary | ICD-10-CM

## 2019-11-14 DIAGNOSIS — M75.32 CALCIFIC TENDINITIS OF LEFT SHOULDER: ICD-10-CM

## 2019-11-14 DIAGNOSIS — R92.8 ABNORMAL MAMMOGRAM: ICD-10-CM

## 2019-11-14 DIAGNOSIS — S46.819A STRAIN OF TRAPEZIUS MUSCLE, UNSPECIFIED LATERALITY, INITIAL ENCOUNTER: ICD-10-CM

## 2019-11-14 PROCEDURE — 97161 PT EVAL LOW COMPLEX 20 MIN: CPT

## 2019-11-14 PROCEDURE — 76642 ULTRASOUND BREAST LIMITED: CPT

## 2019-11-14 PROCEDURE — 97112 NEUROMUSCULAR REEDUCATION: CPT

## 2019-11-14 NOTE — PROGRESS NOTES
PT Evaluation     Today's date: 2019  Patient name: Nohemy Williamson  : 1965  MRN: [id number]  Referring provider: Artie Alex DO  Dx:   Encounter Diagnosis     ICD-10-CM    1  Calcific tendinitis of right shoulder M75 31 Ambulatory referral to Physical Therapy   2  Calcific tendinitis of left shoulder M75 32 Ambulatory referral to Physical Therapy   3  Strain of trapezius muscle, unspecified laterality, initial encounter 95 [phone] Ambulatory referral to Physical Therapy       Start Time: 7  Stop Time: 1432  Total time in clinic (min): 45 minutes    Assessment  Assessment details: Pt is a pleasant 47 y o  female who presents to [facility] with B shoulder pain R>L, neck pain, and upper thoracic pain  Today, she presents with decreased and painful cervical ROM and joint mobility, decreased B UE strength, decreased postural awareness strength and stability, and high self reports of pain  Functionally, she is limited in her ability to lift and carry, stand for prolonged durations, sleep through the night, participate in age appropriate recreation, and perform normal self care and grooming  She is motivated to improve  Pt will benefit from skilled PT to address the aforementioned deficits and limitations in an effort to maximize pain free functional mobility and overall quality of life  Progress as able with these goals in mind  Impairments: abnormal coordination, abnormal gait, abnormal muscle firing, abnormal muscle tone, abnormal or restricted ROM, abnormal movement, activity intolerance, impaired physical strength and pain with function  Understanding of Dx/Px/POC: good   Prognosis: good    Goals  Short term goals (3 weeks):  1) Pt will improve cervical ROM restrictions by 25% pain free  2) Pt will improve B UE and postural strength deficits by 1/3 grade MMT pain free  3) Pt will improve pain at worse to < 5/10     4) Pt will initiate and progress HEP w/ special emphasis on functional postural control/awareness and strength  Long term goals (6 weeks)  1) Pt will improve FOTO to at least 65   2) Pt will sleep through the night in positioning of choosing 6/7 nights a week w/o waking due to pain  3) Pt will perform full day of home activities and self care w/ improved postural awareness and stability, pain < 4/10 throughout  4) Pt will be independent and compliant w/ HEP in order to maximize functional benefit of skilled PT following d/c        Plan  Plan details: HEP to start: see scanned doc    Patient would benefit from: skilled PT  Planned modality interventions: cryotherapy and thermotherapy: hydrocollator packs  Planned therapy interventions: abdominal trunk stabilization, activity modification, ADL retraining, manual therapy, massage, motor coordination training, neuromuscular re-education, patient education, therapeutic training, therapeutic exercise, therapeutic activities, stretching, strengthening, home exercise program, functional ROM exercises, gait training, balance, balance/weight bearing training and body mechanics training  Frequency: 2x week  Duration in visits: 12  Duration in weeks: 6  Treatment plan discussed with: patient        Subjective Evaluation    History of Present Illness  Mechanism of injury: Pt has had B shoulder pain R>L, upper back pain for the last few years  Notes that pain gets more severe when she has to stand and wash dishes, do laundry, and perform activities around home   is present and notes that his wife sometimes seem disinterested in this because of pain  Pt notes that pain wakes her from sleep  She is hopeful to learn how to manage current pain and prevent future incidences of pain   Functional status below:   Quality of life: good    Pain  Current pain ratin  At best pain ratin  At worst pain rating: 10  Location: Base of neck, between B shoulder blades, along B shoulders R>L   Quality: sharp, radiating and throbbing  Relieving factors: heat, medications and rest  Aggravating factors: standing, walking and overhead activity  Progression: no change    Social Support  Steps to enter house: yes  Stairs in house: yes   Lives in: multiple-level home  Lives with: spouse    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         Objective     Postural Observations  Seated posture: poor  Standing posture: poor  Correction of posture: makes symptoms better        Palpation     Additional Palpation Details  Pt is TTP and has increased tissue density through B upper and middle traps, base of cervical PS     Neurological Testing     Sensation   Cervical/Thoracic   Left   Intact: light touch    Right   Intact: light touch    Active Range of Motion     Additional Active Range of Motion Details  Flex: 50-75% w/ min pain  Ext: 25-50% w/ mod pain   SB R: 50% w/ min pain  SB L: 50% w/o pain  Rot R: 52  Rot L: 42    Guarded throughout     Attains full ROM of B shoulder w/ pain at end ranges of flex and ext, more pain on L as compared to R    Joint Play     Comments: Hypomobile throughout C4-T5 w/ relief upon assessment     Strength/Myotome Testing   Cervical Spine     Left   Interossei strength (t1): 4-  Neck lateral flexion (C3): 4-    Right   Interossei strength (t1): 4-  Neck lateral flexion (C3): 4-    Left Shoulder     Planes of Motion   Flexion: 4-   Extension: 4-   Abduction: 4-   Adduction: 4-   External rotation at 0°: 4-   Internal rotation at 0°: 4-     Right Shoulder     Planes of Motion   Flexion: 4-   Extension: 4-   Abduction: 4-   Adduction: 4-   External rotation at 0°: 4-   Internal rotation at 0°: 4-     Left Elbow   Flexion: 4-  Extension: 4-    Right Elbow   Flexion: 4-  Extension: 4-    Additional Strength Details  MT and LT grossly 3+/5     Tests     Additional Tests Details  Cervical Traction: provides significant relief     SNAGs: not tested today    DNF iso lift: not tested, difficulty w/ chin tuck alone    Postural correction provides significant relief      Flowsheet Rows      Most Recent Value   PT/OT G-Codes   Current Score  48   Projected Score  64   FOTO information reviewed  Yes             Precautions: hard of hearing, standard         Manual   11/14 - IE            DTM and TPR              shoulder mobs  gentle PA mobs B grade II            MWM for shoulder scaption/flexion                Cervical traction 2x2 min holds                                Exercise Diary   1           UT and LS stretching  manual 2x30 sec B            PROM  for B shoulders x 2 mins total           Wall slides             Pulleys              IR/ER isos             Alternating isometrics              Rows/ext  scap sq x 20 total           Horizontal abd w/ scap sq             Posture work   wall posture demo and practice x 2-3 mins              chin tucks x 20 total             HEP education and review x 4-5 mins                                                                                                                                               Modalities  1           heat             Ice

## 2019-11-19 ENCOUNTER — OFFICE VISIT (OUTPATIENT)
Dept: PHYSICAL THERAPY | Facility: CLINIC | Age: 54
End: 2019-11-19
Payer: COMMERCIAL

## 2019-11-19 DIAGNOSIS — M75.32 CALCIFIC TENDINITIS OF LEFT SHOULDER: ICD-10-CM

## 2019-11-19 DIAGNOSIS — M75.31 CALCIFIC TENDINITIS OF RIGHT SHOULDER: Primary | ICD-10-CM

## 2019-11-19 DIAGNOSIS — S46.819A STRAIN OF TRAPEZIUS MUSCLE, UNSPECIFIED LATERALITY, INITIAL ENCOUNTER: ICD-10-CM

## 2019-11-19 PROCEDURE — 97110 THERAPEUTIC EXERCISES: CPT

## 2019-11-19 PROCEDURE — 97112 NEUROMUSCULAR REEDUCATION: CPT

## 2019-11-19 PROCEDURE — 97140 MANUAL THERAPY 1/> REGIONS: CPT

## 2019-11-19 NOTE — PROGRESS NOTES
Daily Note     Today's date: 2019  Patient name: Lea Zuniga  : 1965  MRN: [id number]  Referring provider: Dalila Ricardo DO  Dx:   Encounter Diagnosis     ICD-10-CM    1  Calcific tendinitis of right shoulder M75 31    2  Calcific tendinitis of left shoulder M75 32    3  Strain of trapezius muscle, unspecified laterality, initial encounter S41 239N                   Subjective: Pt reports that she feels a bit better today, still getting pain into R shoulder especially in morning but not quite as bad  Objective: Requires initial cueing for proper scap retraction and MT/LT activation but is able to correct and maintain  Assessment: Tolerated treatment well  Patient demonstrated fatigue post treatment and would benefit from continued PT  Pt reports fatigue but no increase in pain by end  She was given updated HEP along w/ GTB and asked to conitnue w/ below exercises on own  No complaints to end session  Plan: Continue per plan of care   walk outs, overhead work, scaption, bent over rows       Precautions: hard of hearing, standard         Manual    - IE            DTM and TPR              shoulder mobs  gentle PA mobs B grade II            MWM for shoulder scaption/flexion     for R shoulder scaption x10-15 total           Cervical traction 2x2 min holds   3x2 min holds throughout               DTM to cervical PS x 2-3 mins total                Exercise Diary   1  2         UT and LS stretching  manual 2x30 sec B   manual 3x30 sec each         PROM  for B shoulders x 2 mins total  x2-3 mins on R          Wall slides    x20 total w/ scap retraction          Pulleys              IR/ER isos             Alternating isometrics              Rows/ext  scap sq x 20 total  GTB rows and ext 3x10-15 each         Horizontal abd w/ scap sq    supine and seated x 20-30 each, demo of diagonals          Posture work   wall posture demo and practice x 2-3 mins   review            chin tucks x 20 total  x20 in supine            HEP education and review x 4-5 mins   reviewed and updated x 3-4 mins                                                                                                                                             Modalities  1  2         heat             Ice     pt deferred

## 2019-11-20 ENCOUNTER — OFFICE VISIT (OUTPATIENT)
Dept: FAMILY MEDICINE CLINIC | Facility: CLINIC | Age: 54
End: 2019-11-20
Payer: COMMERCIAL

## 2019-11-20 VITALS
HEART RATE: 69 BPM | OXYGEN SATURATION: 97 % | WEIGHT: 193.13 LBS | BODY MASS INDEX: 39.01 KG/M2 | SYSTOLIC BLOOD PRESSURE: 124 MMHG | DIASTOLIC BLOOD PRESSURE: 82 MMHG | TEMPERATURE: 97.8 F

## 2019-11-20 DIAGNOSIS — K21.9 GASTROESOPHAGEAL REFLUX DISEASE WITHOUT ESOPHAGITIS: Primary | ICD-10-CM

## 2019-11-20 PROCEDURE — 99213 OFFICE O/P EST LOW 20 MIN: CPT | Performed by: FAMILY MEDICINE

## 2019-11-20 RX ORDER — FAMOTIDINE 40 MG/1
40 TABLET, FILM COATED ORAL DAILY
Qty: 90 TABLET | Refills: 0 | Status: SHIPPED | OUTPATIENT
Start: 2019-11-20 | End: 2020-05-19

## 2019-11-20 NOTE — PROGRESS NOTES
Assessment/Plan:    No problem-specific Assessment & Plan notes found for this encounter  Diagnoses and all orders for this visit:    Gastroesophageal reflux disease without esophagitis  -     famotidine (PEPCID) 40 MG tablet; Take 1 tablet (40 mg total) by mouth daily          Subjective:      Patient ID: Luis Sal is a 47 y o  female  Dav Rachel is here for follow-up her neck is feeling better she has had a few episodes of physical therapy  She is seen by Sarah Lou who has recommended staying on the anti-inflammatory but it is giving her some heartburn so I have re-initiated the Pepcid  We did go over her labs      The following portions of the patient's history were reviewed and updated as appropriate: current medications, past family history, past medical history, past social history, past surgical history and problem list     Review of Systems   Constitutional: Negative  Eyes: Negative  Respiratory: Negative  Cardiovascular: Negative            Objective:      /82 (BP Location: Left arm, Patient Position: Sitting, Cuff Size: Large)   Pulse 69   Temp 97 8 °F (36 6 °C) (Tympanic)   Wt 87 6 kg (193 lb 2 oz)   SpO2 97%   BMI 39 01 kg/m²          Physical Exam

## 2019-11-21 ENCOUNTER — OFFICE VISIT (OUTPATIENT)
Dept: PHYSICAL THERAPY | Facility: CLINIC | Age: 54
End: 2019-11-21
Payer: COMMERCIAL

## 2019-11-21 DIAGNOSIS — M75.31 CALCIFIC TENDINITIS OF RIGHT SHOULDER: Primary | ICD-10-CM

## 2019-11-21 DIAGNOSIS — S46.819A STRAIN OF TRAPEZIUS MUSCLE, UNSPECIFIED LATERALITY, INITIAL ENCOUNTER: ICD-10-CM

## 2019-11-21 DIAGNOSIS — M75.32 CALCIFIC TENDINITIS OF LEFT SHOULDER: ICD-10-CM

## 2019-11-21 PROCEDURE — 97140 MANUAL THERAPY 1/> REGIONS: CPT

## 2019-11-21 PROCEDURE — 97110 THERAPEUTIC EXERCISES: CPT

## 2019-11-21 PROCEDURE — 97112 NEUROMUSCULAR REEDUCATION: CPT

## 2019-11-21 NOTE — PROGRESS NOTES
Daily Note     Today's date: 2019  Patient name: Farrel Essex  : 1965  MRN: [id number]  Referring provider: Johnathon Peterson DO  Dx:   Encounter Diagnosis     ICD-10-CM    1  Calcific tendinitis of right shoulder M75 31    2  Calcific tendinitis of left shoulder M75 32    3  Strain of trapezius muscle, unspecified laterality, initial encounter S41 261G                   Subjective: Pt reports that her mid back and lower cspine feels tight today  Felt good after last session and likes doing her theraband exercises at home  Objective: Greater neural tension noted through L LE as noted by decreased ability to extend L knee in supine at 90/90 hip position, improves w/ reps  Assessment: Tolerated treatment well  Patient demonstrated fatigue post treatment and would benefit from continued PT  Fatigue but no pain by end of session  Does well w/ higher level tband stab and is appropriate for more advanced progressions barring setback  Next visit changed from [address], , to Wed, , as pt will have breast biopsy next Monday, , and will be sore the next day  Plan: Continue per plan of care   Higher level tband stab program as able, prone work, higher level self stretching     Precautions: hard of hearing, standard         Manual    - IE           DTM and TPR              shoulder mobs  gentle PA mobs B grade II    LAD on R x 2-3 mins        MWM for shoulder scaption/flexion      for R shoulder scaption x10-15 total           Cervical traction 2x2 min holds    3x2 min holds throughout   3x2 min holds            DTM to cervical PS x 2-3 mins total   to R side cervical PS and mid tspine PS x 5 mins             Exercise Diary   1   2  3       UT and LS stretching  manual 2x30 sec B    manual 3x30 sec each  manual UT and LS stretchin x 5 mins total       PROM  for B shoulders x 2 mins total   x2-3 mins on R          Wall slides     x20 total w/ scap retraction   no Pulleys       self sciatic nerve flossing x 15-20 B        IR/ER isos             Alternating isometrics              Rows/ext  scap sq x 20 total   GTB rows and ext 3x10-15 each GTB x 10 - review        Horizontal abd w/ scap sq     supine and seated x 20-30 each, demo of diagonals   supine GTB x 20-30       Posture work   wall posture demo and practice x 2-3 mins    review            chin tucks x 20 total   x20 in supine            HEP education and review x 4-5 mins    reviewed and updated x 3-4 mins                RTB T's x 20              YTB overhead press x 15-20                                                                                                                Modalities  1   2  3       heat             Ice      pt deferred

## 2019-11-25 ENCOUNTER — HOSPITAL ENCOUNTER (OUTPATIENT)
Dept: RADIOLOGY | Facility: HOSPITAL | Age: 54
Discharge: HOME | End: 2019-11-25
Attending: FAMILY MEDICINE
Payer: COMMERCIAL

## 2019-11-25 ENCOUNTER — HOSPITAL ENCOUNTER (OUTPATIENT)
Dept: RADIOLOGY | Facility: HOSPITAL | Age: 54
Discharge: HOME | End: 2019-11-25
Attending: FAMILY MEDICINE

## 2019-11-25 VITALS — SYSTOLIC BLOOD PRESSURE: 145 MMHG | DIASTOLIC BLOOD PRESSURE: 75 MMHG

## 2019-11-25 DIAGNOSIS — R92.8 ABNORMAL ULTRASOUND OF BREAST: ICD-10-CM

## 2019-11-25 PROCEDURE — 19083 BX BREAST 1ST LESION US IMAG: CPT

## 2019-11-25 PROCEDURE — 88305 TISSUE EXAM BY PATHOLOGIST: CPT | Performed by: PATHOLOGY

## 2019-11-25 RX ORDER — LIDOCAINE HYDROCHLORIDE 10 MG/ML
INJECTION, SOLUTION EPIDURAL; INFILTRATION; INTRACAUDAL; PERINEURAL CODE/TRAUMA/SEDATION MEDICATION
Status: COMPLETED | OUTPATIENT
Start: 2019-11-25 | End: 2019-11-25

## 2019-11-25 RX ADMIN — LIDOCAINE HYDROCHLORIDE 7 ML: 10 INJECTION, SOLUTION EPIDURAL; INFILTRATION; INTRACAUDAL; PERINEURAL at 13:02

## 2019-11-25 NOTE — SEDATION DOCUMENTATION
US guided right breast biopsy with wing clip placement done  Pt  Tolerated well  No bleeding noted  Steristrips and dsd to site  Discharge instructions reviewed with pt  And AVS to her  Ice pack provided

## 2019-11-25 NOTE — DISCHARGE INSTRUCTIONS
POST LARGE CORE BREAST BIOPSY PATIENT INFORMATION      1  Place an ice pack inside your bra over the top of the dressing every hour for 20 minutes (20 minutes on, 60 minutes off) Do this until bedtime  2  Do not shower or bathe until the following morning       3  You may bathe your breast carefully with the steri-strips in place  Be careful    Not to loosen them  The steri-strips will fall off in 3-5 days  4  You may have mild discomfort, and you may have some bruising where the   Needle entered the skin  This should clear within 5-7 days  5  If you need medicine for discomfort, take acetaminophen products such as   Tylenol  You may also take Advil or Motrin products  6  Do not participate in strenuous activities such as-tennis, aerobics, skiing,  Weight lifting, etc  for 24 hours  Refrain from swimming for 72 hours  7  Wearing a bra for sleeping may be more comfortable for the first 24-48 hours  8  Watch for continued bleeding, pain or fever over 101     For procedures done at the [address] [place] [address] call:  Bernardo Edwards RN at [phone] or  Mack Cohen RN at [phone]  After 4 PM call the Interventional Radiology Department at [phone] and ask to speak with the nurse on call  For procedures performed at [facility] call: The Radiology Nurse at [phone]    After 4 PM call your physician, or go to the Emergency Department  9          The final results of your biopsy are usually available within one week  POST LARGE CORE BREAST BIOPSY PATIENT INFORMATION      9  Place an ice pack inside your bra over the top of the dressing every hour for 20 minutes (20 minutes on, 60 minutes off) Do this until bedtime  10  Do not shower or bathe until the following morning       11  You may bathe your breast carefully with the steri-strips in place  Be careful    Not to loosen them  The steri-strips will fall off in 3-5 days      12  You may have mild discomfort, and you may have some bruising where the   Needle entered the skin  This should clear within 5-7 days  13  If you need medicine for discomfort, take acetaminophen products such as   Tylenol  You may also take Advil or Motrin products  14  Do not participate in strenuous activities such as-tennis, aerobics, skiing,  Weight lifting, etc  for 24 hours  Refrain from swimming for 72 hours  15  Wearing a bra for sleeping may be more comfortable for the first 24-48 hours  16  Watch for continued bleeding, pain or fever over 101     For procedures done at the [address] [place] [address] call:  Debra Cooley RN at [phone] or  Herminio Dominguez RN at [phone]  After 4 PM call the Interventional Radiology Department at [phone] and ask to speak with the nurse on call  For procedures performed at [address] [place] [address] call: The Radiology Nurse at [phone]    After 4 PM call your physician, or go to the Emergency Department  9          The final results of your biopsy are usually available within one week

## 2019-11-26 ENCOUNTER — APPOINTMENT (OUTPATIENT)
Dept: PHYSICAL THERAPY | Facility: CLINIC | Age: 54
End: 2019-11-26
Payer: COMMERCIAL

## 2019-11-26 ENCOUNTER — TELEPHONE (OUTPATIENT)
Dept: RADIOLOGY | Facility: HOSPITAL | Age: 54
End: 2019-11-26

## 2019-11-26 NOTE — TELEPHONE ENCOUNTER
Post procedure call completed on 11/26/19 at 1057  Bleeding: _____yes __x___no    Pain: ___x__yes ______no    Redness/Swelling: ______yes _____x_no    Band aid removed: _____yes __x___no    Steri-Strips intact: __x____yes _____no  Taking Tylenol for pain with relief

## 2019-11-27 ENCOUNTER — APPOINTMENT (OUTPATIENT)
Dept: PHYSICAL THERAPY | Facility: CLINIC | Age: 54
End: 2019-11-27
Payer: COMMERCIAL

## 2019-12-02 ENCOUNTER — TELEPHONE (OUTPATIENT)
Dept: RADIOLOGY | Facility: HOSPITAL | Age: 54
End: 2019-12-02

## 2019-12-03 ENCOUNTER — TELEPHONE (OUTPATIENT)
Dept: SURGICAL ONCOLOGY | Facility: CLINIC | Age: 54
End: 2019-12-03

## 2019-12-03 ENCOUNTER — OFFICE VISIT (OUTPATIENT)
Dept: PHYSICAL THERAPY | Facility: CLINIC | Age: 54
End: 2019-12-03
Payer: COMMERCIAL

## 2019-12-03 DIAGNOSIS — M75.31 CALCIFIC TENDINITIS OF RIGHT SHOULDER: Primary | ICD-10-CM

## 2019-12-03 DIAGNOSIS — M75.32 CALCIFIC TENDINITIS OF LEFT SHOULDER: ICD-10-CM

## 2019-12-03 DIAGNOSIS — S46.819A STRAIN OF TRAPEZIUS MUSCLE, UNSPECIFIED LATERALITY, INITIAL ENCOUNTER: ICD-10-CM

## 2019-12-03 PROCEDURE — 97110 THERAPEUTIC EXERCISES: CPT

## 2019-12-03 PROCEDURE — 97140 MANUAL THERAPY 1/> REGIONS: CPT

## 2019-12-03 PROCEDURE — 97112 NEUROMUSCULAR REEDUCATION: CPT

## 2019-12-03 NOTE — TELEPHONE ENCOUNTER
New Patient Encounter    New Patient Intake Form   Patient Details:  Farrel Essex  [birth year]  [id number]    Background Information:  [address] starts by opening a telephone encounter and gathering the following information   Who is calling to schedule? If not self, relationship to patient? [place] Lipoma    Referring Provider Dr Michael Garza pcp   What is the diagnosis? fibroadenoma   When was the diagnosis? 11/2019   Is patient aware of diagnosis? Yes   Reason for visit? NP DX   Have you had any testing done? If so: when, where? Yes   Are records in [facility]? yes   Was the patient told to bring a disk? no   Scheduling Information:   [facility] [place]     Requesting Specific Provider? barry   Are there any dates/time the patient cannot be seen? no      Miscellaneous: n/a   After completing the above information, please route to Financial Counselor and the appropriate Nurse Navigator for review

## 2019-12-03 NOTE — PROGRESS NOTES
Daily Note     Today's date: 12/3/2019  Patient name: Charlene Merlos  : 1965  MRN: [id number]  Referring provider: Abril Martinez DO  Dx:   Encounter Diagnosis     ICD-10-CM    1  Calcific tendinitis of right shoulder M75 31    2  Calcific tendinitis of left shoulder M75 32    3  Strain of trapezius muscle, unspecified laterality, initial encounter S46 114F                   Subjective: Pt reports that she feels her neck is improving overall, shoulders are still sore  Had breast biopsy last week and notes that she is sore from this  Objective: requires max verbal and tactile cueing to promote proper core contraction during supine stab, mod correction but requires continuous feedback  Assessment: Tolerated treatment well  Patient demonstrated fatigue post treatment and would benefit from continued PT  Pt does well w/ initial core stab program despite need for high level cueing  She was given updated handout depicting core exercises and is safe to perform on own  Higher level shoulder stab and overhead work was deferred so as not to exacerbate soreness from recent biopsy  Plan: Continue per plan of care    prone work, higher level core work w/ tbands or peanut      Precautions: hard of hearing, standard         Manual    - IE      12/3     DTM and TPR              shoulder mobs  gentle PA mobs B grade II     LAD on R x 2-3 mins  lumbar traction x2 mins      MWM for shoulder scaption/flexion      for R shoulder scaption x10-15 total           Cervical traction 2x2 min holds    3x2 min holds throughout    3x2 min holds  2x2 mins           DTM to cervical PS x 2-3 mins total    to R side cervical PS and mid tspine PS x 5 mins  x3-4 mins            Exercise Diary   1   2   3  4     UT and LS stretching  manual 2x30 sec B    manual 3x30 sec each   manual UT and LS stretchin x 5 mins total  manual upper quarter x 3-4 mins total     PROM  for B shoulders x 2 mins total   x2-3 mins on R          Wall slides     x20 total w/ scap retraction    no       Pulleys        self sciatic nerve flossing x 15-20 B   review      IR/ER isos             Alternating isometrics              Rows/ext  scap sq x 20 total   GTB rows and ext 3x10-15 each GTB x 10 - review   GTB sh ext x20 total     Horizontal abd w/ scap sq     supine and seated x 20-30 each, demo of diagonals    supine GTB x 20-30       Posture work   wall posture demo and practice x 2-3 mins    review            chin tucks x 20 total   x20 in supine     review - x 10 total       HEP education and review x 4-5 mins    reviewed and updated x 3-4 mins     review             RTB T's x 20  RTB T's x 20            YTB overhead press x 15-20                 TrA isos, march, single leg ext x 20-30 each              bridging x 20 total                                                                                 Modalities  1   2   3  4     heat        pre x 5 mins cspine     Ice      pt deferred

## 2019-12-05 ENCOUNTER — APPOINTMENT (OUTPATIENT)
Dept: PHYSICAL THERAPY | Facility: CLINIC | Age: 54
End: 2019-12-05
Payer: COMMERCIAL

## 2019-12-10 ENCOUNTER — OFFICE VISIT (OUTPATIENT)
Dept: PHYSICAL THERAPY | Facility: CLINIC | Age: 54
End: 2019-12-10
Payer: COMMERCIAL

## 2019-12-10 DIAGNOSIS — M75.32 CALCIFIC TENDINITIS OF LEFT SHOULDER: ICD-10-CM

## 2019-12-10 DIAGNOSIS — S46.819A STRAIN OF TRAPEZIUS MUSCLE, UNSPECIFIED LATERALITY, INITIAL ENCOUNTER: ICD-10-CM

## 2019-12-10 DIAGNOSIS — M75.31 CALCIFIC TENDINITIS OF RIGHT SHOULDER: Primary | ICD-10-CM

## 2019-12-10 PROCEDURE — 97112 NEUROMUSCULAR REEDUCATION: CPT

## 2019-12-10 PROCEDURE — 97110 THERAPEUTIC EXERCISES: CPT

## 2019-12-10 PROCEDURE — 97140 MANUAL THERAPY 1/> REGIONS: CPT

## 2019-12-10 NOTE — PROGRESS NOTES
Daily Note     Today's date: 12/10/2019  Patient name: Pia Mauricio  : 1965  MRN: [id number]  Referring provider: Leslee Grijalva DO  Dx:   Encounter Diagnosis     ICD-10-CM    1  Calcific tendinitis of right shoulder M75 31    2  Calcific tendinitis of left shoulder M75 32    3  Strain of trapezius muscle, unspecified laterality, initial encounter S44 121P                   Subjective: Pt notes some tightness through shoulders, notes that she feels better overall  Rates pain at 7/10 to start session (this does not align w/ pt description of pain)  Pain is in mid to low back  Objective: Requires intermittent cueing throughout pball stab to decrease reliance on upper quarter and promote use of core/hip stability  Corrects but fatigues quickly       Assessment: Tolerated treatment well  Patient demonstrated fatigue post treatment and would benefit from continued PT  Fatigue but no increase in pain by end of session  Notes overall improvement in mobility and decrease in sx by end  Continues to respond well to manual work, slowly tolerates more exercise  She arrived 13 min late so further progressions were held, however she is appropriate for more increases in program as sx allow  Safe to perform pball work at home         Plan: Continue per plan of care  hip abd walking, review tband core stab, progress hip hinging work      Precautions: hard of hearing, standard         Manual    - IE    11/18   11/21   12/3  12/10   DTM and TPR              shoulder mobs  gentle PA mobs B grade II     LAD on R x 2-3 mins   lumbar traction x2 mins  2x2 min holds    MWM for shoulder scaption/flexion      for R shoulder scaption x10-15 total           Cervical traction 2x2 min holds    3x2 min holds throughout    3x2 min holds   2x2 mins   2-3x1-2 min holds         DTM to cervical PS x 2-3 mins total    to R side cervical PS and mid tspine PS x 5 mins   x3-4 mins   x2-3 mins total       to   Exercise Diary   1   2 3   4  5   UT and LS stretching  manual 2x30 sec B    manual 3x30 sec each   manual UT and LS stretchin x 5 mins total   manual upper quarter x 3-4 mins total  UT/LS and HS stretching - 5 mins total   PROM  for B shoulders x 2 mins total   x2-3 mins on R          Wall slides     x20 total w/ scap retraction    no       Pulleys        self sciatic nerve flossing x 15-20 B    review   review    IR/ER isos             Alternating isometrics              Rows/ext  scap sq x 20 total   GTB rows and ext 3x10-15 each GTB x 10 - review    GTB sh ext x20 total  review    Horizontal abd w/ scap sq     supine and seated x 20-30 each, demo of diagonals    supine GTB x 20-30       Posture work   wall posture demo and practice x 2-3 mins    review            chin tucks x 20 total   x20 in supine      review - x 10 total  review      HEP education and review x 4-5 mins    reviewed and updated x 3-4 mins      review             RTB T's x 20   RTB T's x 20            YTB overhead press x 15-20                  TrA isos, march, single leg ext x 20-30 each  isos, march x 20 each            bridging x 20 total  w/ ad sq x 20              peanut iso press x 10, w/ march, hip abd, ext x 20-30 each                                                                 Modalities  1   2   3   4  5   heat         pre x 5 mins cspine  no   Ice      pt deferred

## 2019-12-12 ENCOUNTER — APPOINTMENT (OUTPATIENT)
Dept: PHYSICAL THERAPY | Facility: CLINIC | Age: 54
End: 2019-12-12
Payer: COMMERCIAL

## 2019-12-17 ENCOUNTER — APPOINTMENT (OUTPATIENT)
Dept: PHYSICAL THERAPY | Facility: CLINIC | Age: 54
End: 2019-12-17
Payer: COMMERCIAL

## 2019-12-17 PROBLEM — N63.10 BREAST MASS, RIGHT: Status: ACTIVE | Noted: 2019-12-17

## 2019-12-19 ENCOUNTER — APPOINTMENT (OUTPATIENT)
Dept: PHYSICAL THERAPY | Facility: CLINIC | Age: 54
End: 2019-12-19
Payer: COMMERCIAL

## 2019-12-19 ENCOUNTER — CONSULT (OUTPATIENT)
Dept: SURGICAL ONCOLOGY | Facility: CLINIC | Age: 54
End: 2019-12-19
Payer: COMMERCIAL

## 2019-12-19 ENCOUNTER — EVALUATION (OUTPATIENT)
Dept: PHYSICAL THERAPY | Facility: CLINIC | Age: 54
End: 2019-12-19
Payer: COMMERCIAL

## 2019-12-19 VITALS
HEART RATE: 79 BPM | DIASTOLIC BLOOD PRESSURE: 90 MMHG | SYSTOLIC BLOOD PRESSURE: 120 MMHG | BODY MASS INDEX: 39.31 KG/M2 | WEIGHT: 195 LBS | TEMPERATURE: 97.4 F | HEIGHT: 59 IN | RESPIRATION RATE: 16 BRPM

## 2019-12-19 DIAGNOSIS — R89.7 ABNORMAL BREAST BIOPSY: ICD-10-CM

## 2019-12-19 DIAGNOSIS — S46.819A STRAIN OF TRAPEZIUS MUSCLE, UNSPECIFIED LATERALITY, INITIAL ENCOUNTER: ICD-10-CM

## 2019-12-19 DIAGNOSIS — M75.32 CALCIFIC TENDINITIS OF LEFT SHOULDER: ICD-10-CM

## 2019-12-19 DIAGNOSIS — N63.10 BREAST MASS, RIGHT: Primary | ICD-10-CM

## 2019-12-19 DIAGNOSIS — M75.31 CALCIFIC TENDINITIS OF RIGHT SHOULDER: Primary | ICD-10-CM

## 2019-12-19 PROCEDURE — 99205 OFFICE O/P NEW HI 60 MIN: CPT | Performed by: SURGERY

## 2019-12-19 PROCEDURE — 97112 NEUROMUSCULAR REEDUCATION: CPT

## 2019-12-19 PROCEDURE — 97110 THERAPEUTIC EXERCISES: CPT

## 2019-12-19 PROCEDURE — 97140 MANUAL THERAPY 1/> REGIONS: CPT

## 2019-12-19 RX ORDER — OXYCODONE HYDROCHLORIDE AND ACETAMINOPHEN 5; 325 MG/1; MG/1
1 TABLET ORAL EVERY 6 HOURS PRN
Qty: 6 TABLET | Refills: 0 | Status: SHIPPED | OUTPATIENT
Start: 2019-12-19 | End: 2020-07-17 | Stop reason: ALTCHOICE

## 2019-12-19 NOTE — PROGRESS NOTES
Surgical Oncology Consult Note       [address]  [facility]  [address] 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 however there was sufficient stromal material adjacent to the epithelium with some mild nuclear atypia that a excision of the mass was recommended by roshan Holliday  The patient I will underwent the process of informed consent for right breast needle localization lumpectomy  Oncology History:      No history exists  History of Present Illness: This is a 66-year-old woman who had previously undergone a biopsy for a mammogram/ultrasound identified mass this demonstrated a finding consistent with a fibroadenoma and she was recommended to follow at  It is subsequently increased in size and read biopsied which demonstrated a mild nuclear atypia with stromal condensation next the epithelium  Roshan Holliday recommended deferring final diagnosis on at an excisional biopsy specimen  Patient presents here to initiate that process  Review of Systems:   Review of Systems   All other systems reviewed and are negative        Past Medical History:      Patient Active Problem List   Diagnosis    Dyspnea on exertion    Dysphonia    Laryngeal stenosis    Cough    Gastroesophageal reflux disease    Reflux laryngitis    Mixed conductive and sensorineural hearing loss of right ear with restricted hearing of left ear    Perforation of right tympanic membrane    Breast mass, right        Past Medical History:   Diagnosis Date    Adult-onset obesity     Allergic rhinitis     Disc displacement, cervical     Retinal disorders     last assessed 6/30/14    Tuberculosis, laryngeal     Vitamin D deficiency disease     last assessed 6/30/14        Past Surgical History:   Procedure Laterality Date    BREAST SURGERY      PA BRONCHOSCOPY,DIAGNOSTIC N/A 10/19/2018    Procedure: BRONCHOSCOPY RIGID;  Surgeon: Bella Lucas MD;  [facility];  Service: ENT    PA LARYNGOSCOPY,DIRCT,OP [id number] N/A 10/19/2018    Procedure: EXCISION LARYNGEAL SCAR; SUPRAGLOTTOPLASTY  POSSIBLE JET VENTILATION; CO2 LASER; COBLATOR;  Surgeon: Bella Lucas MD;  [facility];  Service: ENT    PA LARYNGOSCOPY,DIRCT,OP,BIOPSY N/A 10/19/2018    Procedure: MICRO DIRECT LARYNGOSCOPY;  Surgeon: Bella Lucas MD;  [facility];  Service: ENT    US GUIDED BREAST BIOPSY RIGHT COMPLETE Right 11/25/2019        Family History   Problem Relation Age of Onset    Lung cancer Mother 76    Liver cancer Brother         Social History     Socioeconomic History    Marital status: /Civil Union     Spouse name: Not on file    Number of children: Not on file    Years of education: Not on file    Highest education level: Not on file   Occupational History    Not on file   Social Needs    Financial resource strain: Not on file    Food insecurity:     Worry: Not on file     Inability: Not on file    Transportation needs:     Medical: Not on file     Non-medical: Not on file   Tobacco Use    Smoking status: Never Smoker    Smokeless tobacco: Never Used   Substance and Sexual Activity    Alcohol use: No    Drug use: No    Sexual activity: Never     Birth control/protection: Post-menopausal   Lifestyle    Physical activity:     Days per week: Not on file     Minutes per session: Not on file    Stress: Not on file   Relationships    Social connections:     Talks on phone: Not on file     Gets together: Not on file     Attends Restorationism service: Not on file     Active member of club or organization: Not on file     Attends meetings of clubs or organizations: Not on file     Relationship status: Not on file    Intimate partner violence:     Fear of current or ex partner: Not on file     Emotionally abused: Not on file     Physically abused: Not on file     Forced sexual activity: Not on file   Other Topics Concern    Not on file   Social History Narrative    Not on file        Current Outpatient Medications:     cyclobenzaprine (FLEXERIL) 10 mg tablet, TAKE 1 TABLET BY MOUTH DAILY AT BEDTIME, Disp: 30 tablet, Rfl: 0    famotidine (PEPCID) 40 MG tablet, Take 1 tablet (40 mg total) by mouth daily at bedtime, Disp: 30 tablet, Rfl: 6    famotidine (PEPCID) 40 MG tablet, Take 1 tablet (40 mg total) by mouth daily, Disp: 90 tablet, Rfl: 0    NAPROXEN  MG EC tablet, TAKE 1 TABLET BY MOUTH TWICE A DAY WITH MEALS, Disp: 20 tablet, Rfl: 0    omeprazole (PriLOSEC) 40 MG capsule, Take 1 capsule (40 mg total) by mouth every morning, Disp: 30 capsule, Rfl: 6     Allergies   Allergen Reactions    Pollen Extract        Physical Exam:     Vitals:    12/19/19 1223   BP: 120/90   Pulse: 79   Resp: 16   Temp: (!) 97 4 °F (36 3 °C)     Physical Exam   Constitutional: She is oriented to person, place, and time  She appears well-developed and well-nourished  HENT:   Head: Normocephalic and atraumatic  Mouth/Throat: Oropharynx is clear and moist    Eyes: Pupils are equal, round, and reactive to light  EOM are normal    Neck: Normal range of motion  Neck supple  No JVD present  No tracheal deviation present  No thyromegaly present  Cardiovascular: Normal rate, regular rhythm, normal heart sounds and intact distal pulses  Exam reveals no gallop and no friction rub  No murmur heard  Pulmonary/Chest: Effort normal and breath sounds normal  No respiratory distress  She has no wheezes   She has no rales    Examination of the right breast demonstrates a well-healed biopsy lenny in the upper outer quadrant  Underneath this is a area of increased tissue difficult to describe this is a dominant mass on exam   Is consistent with the location of her lesion  There are no other masses in the breast there is no axillary adenopathy  Examination of the left breast demonstrates no skin changes nipple discharge dominant masses or axillary adenopathy  Abdominal: Soft  She exhibits no distension and no mass  There is no hepatomegaly  There is no tenderness  There is no rebound and no guarding  Musculoskeletal: Normal range of motion  She exhibits no edema or tenderness  Lymphadenopathy:     She has no cervical adenopathy  Neurological: She is alert and oriented to person, place, and time  No cranial nerve deficit  Skin: Skin is warm and dry  No rash noted  No erythema  Psychiatric: She has a normal mood and affect  Her behavior is normal    Vitals reviewed  Results:   I reviewed her mammogram and ultrasound I concur with report there consistent with her pathology  Discussion/Summary:   I explained the patient that this most likely is not a malignant lesion however total excision of the lesion is recommended to exclude a malignancy  The patient is in agreement with this  We went through the process of informed consent  I outlined the complications as outlined on the consent form in detail  All questions were answered the patient's satisfaction  We will coordinate the surgery next mutual convenience  Advance Care Planning/Advance Directives:  I discussed the disease status, treatment plans and follow-up with the patient

## 2019-12-19 NOTE — PROGRESS NOTES
Progress Note     Today's date: 2019  Patient name: Kylah Olsen  : 1965  MRN: [id number]  Referring provider: Leonor Hi DO  Dx:   Encounter Diagnosis     ICD-10-CM    1  Calcific tendinitis of right shoulder M75 31    2  Calcific tendinitis of left shoulder M75 32    3  Strain of trapezius muscle, unspecified laterality, initial encounter [id number] [id number]                   Subjective: Pt reports that PT has been beneficial overall  She still notes some tightness/soreness through neck and upper shoulders but feels like exercises help  She notes that pain is usually better than when she started  She has three visits scheduled before she has medical procedure done in mid January  Functional update below:       Goals  Short term goals (3 weeks): ALL PROGRESSED   1) Pt will improve cervical ROM restrictions by 25% pain free  2) Pt will improve B UE and postural strength deficits by 1/3 grade MMT pain free  3) Pt will improve pain at worse to < 5/10  4) Pt will initiate and progress HEP w/ special emphasis on functional postural control/awareness and strength  Long term goals (6 weeks) ALL PROGRESSED   1) Pt will improve FOTO to at least 65   2) Pt will sleep through the night in positioning of choosing 6/7 nights a week w/o waking due to pain  3) Pt will perform full day of home activities and self care w/ improved postural awareness and stability, pain < 4/10 throughout     4) Pt will be independent and compliant w/ HEP in order to maximize functional benefit of skilled PT following d/c      **goals extended by 2 and 4 weeks, respectively       Pain  Current pain ratin  At best pain rating: 3-4  At worst pain ratin  Location: Base of neck, between B shoulder blades, along B shoulders R>L   Quality: sharp, radiating and throbbing  Relieving factors: heat, medications and rest  Aggravating factors: standing, walking and overhead activity  Progression: improved    Social Support  Steps to enter house: yes  Stairs in house: yes   Lives in: multiple-level home  Lives with: spouse    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         Objective     Postural Observations  Seated posture: poor  Standing posture: fair   Correction of posture: makes symptoms better        Palpation     Additional Palpation Details  Pt is TTP and has increased tissue density through B upper and middle traps, base of cervical PS    -12/19: improved TTP and tissue density throughout upper quarter     Neurological Testing     Sensation   Cervical/Thoracic   Left   Intact: light touch    Right   Intact: light touch    Active Range of Motion     Additional Active Range of Motion Details  Flex: 50-75% w/ min pain  Ext: 25-50% w/ mod pain   SB R: 50% w/ min pain  SB L: 50% w/o pain  Rot R: 52  Rot L: 42    Guarded throughout     Attains full ROM of B shoulder w/ pain at end ranges of flex and ext, more pain on L as compared to R    12/19: can still attain full shoulder ROM w/ smoother ROM throughout, cervical ROM grossly 75% of normal limits throughout w/ equal rotation, min guarding but no significant pain throughout ROM testing     Joint Play     Comments: Hypomobile throughout C4-T5 w/ relief upon assessment    -12/19: mobility improving through cspine and tspine     Strength/Myotome Testing   Cervical Spine     Left   Interossei strength (t1): 4-  Neck lateral flexion (C3): 4-    Right   Interossei strength (t1): 4-  Neck lateral flexion (C3): 4-    Left Shoulder     Planes of Motion   Flexion: 4-   Extension: 4-   Abduction: 4-   Adduction: 4-   External rotation at 0°: 4-   Internal rotation at 0°: 4-     Right Shoulder     Planes of Motion   Flexion: 4-   Extension: 4-   Abduction: 4-   Adduction: 4-   External rotation at 0°: 4-   Internal rotation at 0°: 4-     Left Elbow   Flexion: 4-  Extension: 4-    Right Elbow   Flexion: 4-  Extension: 4-    Additional Strength Details  MT and LT grossly 3+/5     12/19: grossly 4/5 throughout, 4-/5 for MT and LT - requires less cueing for posture throughout     Tests     Additional Tests Details  Cervical Traction: provides significant relief     SNAGs: not tested today    DNF iso lift: not tested, difficulty w/ chin tuck alone    Postural correction provides significant relief    12/19: requires less cueing for posture, DNF lifts x 4-6 sec before loss of form, SNAGs not indicated, traction continues to provide relief         Assessment: Tolerated treatment well  Patient demonstrated fatigue post treatment and would benefit from continued PT  Pt has made good progress towards goals  Her motion and functional strength are improving, she does well w/ HEP and is appropriate for higher level progressions over the next three weeks  She was heavily educated on importance of proper posture as she moves closer to surgery date as this will help to prevent the negative effects of pain associated w/ surgery  Pt and  verbalize understanding  Plan: Continue per plan of care   Higher level functional core stability as able     Precautions: hard of hearing, standard         Manual  12/19    11/21   12/3   12/10   DTM and TPR              shoulder mobs      LAD on R x 2-3 mins   lumbar traction x2 mins   2x2 min holds    MWM for shoulder scaption/flexion      for R shoulder scaption x10-15 total           Cervical traction 2x2 min holds    3x2 min holds throughout    3x2 min holds   2x2 mins    2-3x1-2 min holds         DTM to cervical PS x 2-3 mins total    to R side cervical PS and mid tspine PS x 5 mins   x3-4 mins    x2-3 mins total       to    Exercise Diary  6    3   4   5   UT and LS stretching  manual 2x30 sec B, ROM testing x 3-4 mins    manual 3x30 sec each   manual UT and LS stretchin x 5 mins total   manual upper quarter x 3-4 mins total   UT/LS and HS stretching - 5 mins total   PROM    x2-3 mins on R          Wall slides     x20 total w/ scap retraction    no       Pulleys        self sciatic nerve flossing x 15-20 B    review    review    IR/ER isos             Alternating isometrics              Rows/ext  scap sq x 20 total - review and practice    GTB rows and ext 3x10-15 each GTB x 10 - review    GTB sh ext x20 total   review    Horizontal abd w/ scap sq  RTB x 20-30 total   supine and seated x 20-30 each, demo of diagonals    supine GTB x 20-30       Posture work     review              x20 in supine      review - x 10 total   review      HEP education and review x 4-5 mins    reviewed and updated x 3-4 mins      review             RTB T's x 20   RTB T's x 20        hip abd walking GTB 20'x8, monster walks x 4-5     YTB overhead press x 15-20           yellow tubing pallof press x10-15 B, overhead chops x 10-15       TrA isos, march, single leg ext x 20-30 each   isos, march x 20 each            bridging x 20 total   w/ ad sq x 20              peanut iso press x 10, w/ march, hip abd, ext x 20-30 each                                                                 Modalities 6    3   4   5   heat  home       pre x 5 mins cspine   no   Ice      pt deferred

## 2019-12-19 NOTE — H&P (VIEW-ONLY)
Surgical Oncology Consult Note       [address]  [facility]  [address] 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 however there was sufficient stromal material adjacent to the epithelium with some mild nuclear atypia that a excision of the mass was recommended by roshan Holliday  The patient I will underwent the process of informed consent for right breast needle localization lumpectomy  Oncology History:      No history exists  History of Present Illness: This is a 51-year-old woman who had previously undergone a biopsy for a mammogram/ultrasound identified mass this demonstrated a finding consistent with a fibroadenoma and she was recommended to follow at  It is subsequently increased in size and read biopsied which demonstrated a mild nuclear atypia with stromal condensation next the epithelium  Roshan Holliday recommended deferring final diagnosis on at an excisional biopsy specimen  Patient presents here to initiate that process  Review of Systems:   Review of Systems   All other systems reviewed and are negative        Past Medical History:      Patient Active Problem List   Diagnosis    Dyspnea on exertion    Dysphonia    Laryngeal stenosis    Cough    Gastroesophageal reflux disease    Reflux laryngitis    Mixed conductive and sensorineural hearing loss of right ear with restricted hearing of left ear    Perforation of right tympanic membrane    Breast mass, right        Past Medical History:   Diagnosis Date    Adult-onset obesity     Allergic rhinitis     Disc displacement, cervical     Retinal disorders     last assessed 6/30/14    Tuberculosis, laryngeal     Vitamin D deficiency disease     last assessed 6/30/14        Past Surgical History:   Procedure Laterality Date    BREAST SURGERY      TN BRONCHOSCOPY,DIAGNOSTIC N/A 10/19/2018    Procedure: BRONCHOSCOPY RIGID;  Surgeon: Mely Paulson MD;  [facility];  Service: ENT    TN LARYNGOSCOPY,DIRCT,OP Phyllis [address]inesh N/A 10/19/2018    Procedure: EXCISION LARYNGEAL SCAR; SUPRAGLOTTOPLASTY  POSSIBLE JET VENTILATION; CO2 LASER; COBLATOR;  Surgeon: Mely Paulson MD;  [facility];  Service: ENT    TN LARYNGOSCOPY,DIRCT,OP,BIOPSY N/A 10/19/2018    Procedure: MICRO DIRECT LARYNGOSCOPY;  Surgeon: Mely Paulson MD;  [facility];  Service: ENT    US GUIDED BREAST BIOPSY RIGHT COMPLETE Right 11/25/2019        Family History   Problem Relation Age of Onset    Lung cancer Mother 76    Liver cancer Brother         Social History     Socioeconomic History    Marital status: /Civil Union     Spouse name: Not on file    Number of children: Not on file    Years of education: Not on file    Highest education level: Not on file   Occupational History    Not on file   Social Needs    Financial resource strain: Not on file    Food insecurity:     Worry: Not on file     Inability: Not on file    Transportation needs:     Medical: Not on file     Non-medical: Not on file   Tobacco Use    Smoking status: Never Smoker    Smokeless tobacco: Never Used   Substance and Sexual Activity    Alcohol use: No    Drug use: No    Sexual activity: Never     Birth control/protection: Post-menopausal   Lifestyle    Physical activity:     Days per week: Not on file     Minutes per session: Not on file    Stress: Not on file   Relationships    Social connections:     Talks on phone: Not on file     Gets together: Not on file     Attends Sabianist service: Not on file     Active member of club or organization: Not on file     Attends meetings of clubs or organizations: Not on file     Relationship status: Not on file    Intimate partner violence:     Fear of current or ex partner: Not on file     Emotionally abused: Not on file     Physically abused: Not on file     Forced sexual activity: Not on file   Other Topics Concern    Not on file   Social History Narrative    Not on file        Current Outpatient Medications:     cyclobenzaprine (FLEXERIL) 10 mg tablet, TAKE 1 TABLET BY MOUTH DAILY AT BEDTIME, Disp: 30 tablet, Rfl: 0    famotidine (PEPCID) 40 MG tablet, Take 1 tablet (40 mg total) by mouth daily at bedtime, Disp: 30 tablet, Rfl: 6    famotidine (PEPCID) 40 MG tablet, Take 1 tablet (40 mg total) by mouth daily, Disp: 90 tablet, Rfl: 0    NAPROXEN  MG EC tablet, TAKE 1 TABLET BY MOUTH TWICE A DAY WITH MEALS, Disp: 20 tablet, Rfl: 0    omeprazole (PriLOSEC) 40 MG capsule, Take 1 capsule (40 mg total) by mouth every morning, Disp: 30 capsule, Rfl: 6     Allergies   Allergen Reactions    Pollen Extract        Physical Exam:     Vitals:    12/19/19 1223   BP: 120/90   Pulse: 79   Resp: 16   Temp: (!) 97 4 °F (36 3 °C)     Physical Exam   Constitutional: She is oriented to person, place, and time  She appears well-developed and well-nourished  HENT:   Head: Normocephalic and atraumatic  Mouth/Throat: Oropharynx is clear and moist    Eyes: Pupils are equal, round, and reactive to light  EOM are normal    Neck: Normal range of motion  Neck supple  No JVD present  No tracheal deviation present  No thyromegaly present  Cardiovascular: Normal rate, regular rhythm, normal heart sounds and intact distal pulses  Exam reveals no gallop and no friction rub  No murmur heard  Pulmonary/Chest: Effort normal and breath sounds normal  No respiratory distress  She has no wheezes   She has no rales    Examination of the right breast demonstrates a well-healed biopsy lenny in the upper outer quadrant  Underneath this is a area of increased tissue difficult to describe this is a dominant mass on exam   Is consistent with the location of her lesion  There are no other masses in the breast there is no axillary adenopathy  Examination of the left breast demonstrates no skin changes nipple discharge dominant masses or axillary adenopathy  Abdominal: Soft  She exhibits no distension and no mass  There is no hepatomegaly  There is no tenderness  There is no rebound and no guarding  Musculoskeletal: Normal range of motion  She exhibits no edema or tenderness  Lymphadenopathy:     She has no cervical adenopathy  Neurological: She is alert and oriented to person, place, and time  No cranial nerve deficit  Skin: Skin is warm and dry  No rash noted  No erythema  Psychiatric: She has a normal mood and affect  Her behavior is normal    Vitals reviewed  Results:   I reviewed her mammogram and ultrasound I concur with report there consistent with her pathology  Discussion/Summary:   I explained the patient that this most likely is not a malignant lesion however total excision of the lesion is recommended to exclude a malignancy  The patient is in agreement with this  We went through the process of informed consent  I outlined the complications as outlined on the consent form in detail  All questions were answered the patient's satisfaction  We will coordinate the surgery next mutual convenience  Advance Care Planning/Advance Directives:  I discussed the disease status, treatment plans and follow-up with the patient

## 2019-12-19 NOTE — PATIENT INSTRUCTIONS
Pre-Surgery Instructions:   Medication Instructions    cyclobenzaprine (FLEXERIL) 10 mg tablet Stop taking 1 days prior to surgery         famotidine (PEPCID) 40 MG tablet Stop taking 1 days prior to surgery    NAPROXEN  MG EC tablet Stop taking 1 days prior to surgery    omeprazole (PriLOSEC) 40 MG capsule Stop taking 1 days prior to surgery           Breast Lumpectomy   AMBULATORY CARE:   What you need to know about a lumpectomy:  A lumpectomy is surgery to remove a mass in your breast  Breast tissue that surrounds the mass may also be taken  A lumpectomy is also known as breast-conserving surgery, a partial mastectomy, or a segmental mastectomy  How to prepare for a lumpectomy:   · You may need a mammogram or ultrasound before surgery  These tests may be done the same day as your surgery or at an earlier time  Your healthcare provider may use pictures from these tests to lenny the location of the mass  The marker will show him where to make your incision  · Your healthcare provider will talk to you about how to prepare for surgery  He may tell you not to eat or drink anything after midnight on the day of your surgery  He will tell you what medicines to take or not take on the day of your surgery  You may need to stop taking blood thinners or aspirin several days before your surgery  Arrange for someone to drive you home and stay with you for 24 hours after surgery  This person can help care for you, and monitor for any problems  What will happen during a lumpectomy:  You will be given general anesthesia to keep you asleep and free from pain during surgery  You may be given an antibiotic through your IV to help prevent a bacterial infection  Your healthcare provider will make an incision in your breast and remove the mass  He may also remove breast tissue or lymph nodes that are close to the mass  A drain may be inserted near your incision to remove extra fluid   This will decrease swelling and help your incision heal  Your healthcare provider will close your incision with stitches or strips of medical tape and cover it with a bandage  He may also wrap a tight-fitting bandage around both of your breasts  This may decrease swelling, bleeding, and pain  What will happen after a lumpectomy:  Healthcare providers will monitor you until you are awake  You may able to go home when you are awake and your pain is controlled  Instead you may need to spend the night in the hospital    Risks of a lumpectomy:  You may bleed more than expected or get an infection  Nerves, blood vessels, and muscles may be damaged during your surgery  You may have swelling in your arm closest to the lumpectomy or where lymph nodes were removed  This swelling is called lymphedema  Lymphedema may cause tingling, numbness, stiffness, and weakness in your arm  This may be permanent  You may get a blood clot in your arm or leg  The blood clot may travel to your heart lungs, or brain  This may become life-threatening  Call 911 for any of the following:   · You feel lightheaded, short of breath, and have chest pain  · You cough up blood  · You have trouble breathing  Seek care immediately if:   · Blood soaks through your bandage  · Your stitches come apart  · Your bruise suddenly gets bigger  · Your leg or arm is larger than normal and painful  Contact your healthcare provider if:   · You have a fever or chills  · Your wound is red, swollen, or draining pus  · You have nausea or are vomiting  · Your skin is itchy, swollen, or you have a rash  · Your pain does not get better after you take pain medicine  · Your drain falls out or stops draining fluid  · Your drain has pus or foul-smelling fluid coming out of it  · You have questions or concerns about your condition or care  Medicines: You may need any of the following:  · Antibiotics  help prevent a bacterial infection       · Prescription pain medicine  may be given  Ask your healthcare provider how to take this medicine safely  Some prescription pain medicines contain acetaminophen  Do not take other medicines that contain acetaminophen without talking to your healthcare provider  Too much acetaminophen may cause liver damage  Prescription pain medicine may cause constipation  Ask your healthcare provider how to prevent or treat constipation  · NSAIDs , such as ibuprofen, help decrease swelling, pain, and fever  NSAIDs can cause stomach bleeding or kidney problems in certain people  If you take blood thinner medicine, always ask your healthcare provider if NSAIDs are safe for you  Always read the medicine label and follow directions  · Take your medicine as directed  Contact your healthcare provider if you think your medicine is not helping or if you have side effects  Tell him or her if you are allergic to any medicine  Keep a list of the medicines, vitamins, and herbs you take  Include the amounts, and when and why you take them  Bring the list or the pill bottles to follow-up visits  Carry your medicine list with you in case of an emergency  Care for your wound as directed: If you have a tight-fitting bandage, you can remove it in 24 to 48 hours, or as directed  Ask your healthcare provider when your incision can get wet  You may need to take a sponge bath until your drain is removed  Carefully wash around the incision with soap and water  It is okay to allow the soap and water to gently run over your incision  Gently pat dry the area and put on new, clean bandages as directed  Change your bandages when they get wet or dirty  Check your incision every day for redness, pus, or swelling  Self-care:   · Apply ice  on your breast for 15 to 20 minutes every hour or as directed  Use an ice pack, or put crushed ice in a plastic bag  Cover it with a towel  Ice helps prevent tissue damage and decreases swelling and pain  · Rest  as directed   Do not lift anything heavy  Do not push or pull with your arms  Take short walks around the house  Gradually walk further as you feel better  Ask your healthcare provider when you can return to your normal activities  · Empty your drain  as directed  You may need to write down how much you empty from your drain each day  Ask your healthcare provider for more information about how to empty your drain  · Wear a supportive bra  as directed  Wait until you remove the tight-fitting bandage to wear a bra  You may be given a surgical bra or told to wear a sports bra  A supportive bra may help hold your bandages in place  It may also help with swelling and pain  Do not  wear bras with lace or underwire  They may rub against your incision and cause discomfort  Arm stretches: Your healthcare provider may show you how to do arm stretches  Arm stretches may prevent stiff arms or shoulders  You may need to wait until after your drains are removed to begin stretching  Do not do arm stretches until your healthcare provider says it is okay  Ask your healthcare provider for more information about arm stretches  Follow up with your healthcare provider as directed:  Write down your questions so you remember to ask them during your visits  © 2017 [employer] Information is for End User's use only and may not be sold, redistributed or otherwise used for commercial purposes  All illustrations and images included in CareNotes® are the copyrighted property of [employer]  or [employer]  The above information is an  only  It is not intended as medical advice for individual conditions or treatments  Talk to your doctor, nurse or pharmacist before following any medical regimen to see if it is safe and effective for you

## 2019-12-24 ENCOUNTER — OFFICE VISIT (OUTPATIENT)
Dept: PHYSICAL THERAPY | Facility: CLINIC | Age: 54
End: 2019-12-24
Payer: COMMERCIAL

## 2019-12-24 DIAGNOSIS — M75.32 CALCIFIC TENDINITIS OF LEFT SHOULDER: ICD-10-CM

## 2019-12-24 DIAGNOSIS — M75.31 CALCIFIC TENDINITIS OF RIGHT SHOULDER: Primary | ICD-10-CM

## 2019-12-24 DIAGNOSIS — S46.819A STRAIN OF TRAPEZIUS MUSCLE, UNSPECIFIED LATERALITY, INITIAL ENCOUNTER: ICD-10-CM

## 2019-12-24 PROCEDURE — 97140 MANUAL THERAPY 1/> REGIONS: CPT

## 2019-12-24 PROCEDURE — 97112 NEUROMUSCULAR REEDUCATION: CPT

## 2019-12-24 PROCEDURE — 97110 THERAPEUTIC EXERCISES: CPT

## 2019-12-24 NOTE — PROGRESS NOTES
Daily Note     Today's date: 2019  Patient name: Nitesh Li  : 1965  MRN: [id number]  Referring provider: Arcenio Ortega DO  Dx:   Encounter Diagnosis     ICD-10-CM    1  Calcific tendinitis of right shoulder M75 31    2  Calcific tendinitis of left shoulder M75 32    3  Strain of trapezius muscle, unspecified laterality, initial encounter S43 [id number]                   Subjective: Pt reports that low back was really bothering her last night, pain was up to a 10/10 after [place] decorating  Had a hard time sleeping but feels better today  Neck/shoulders feel pretty good to start session  Pain in mid to low back up to 8/10 this morning  Objective: Pt requires intermittent verbal and tactile cueing to promote proper TrA activation throughout core stab program  Able to correct but fatigues quickly  Assessment: Tolerated treatment well  Patient demonstrated fatigue post treatment and would benefit from continued PT  Pt reports fatigue but no significant pain by end of session  She does well w/ exercise program and would benefit greatly from continued use of HEP on own  She was heavily educated on importance of improving core and hip stability to help offset low back pain  Verbalizes understanding  Plan: Continue per plan of care   standing progressions as much as possible      Precautions: hard of hearing, standard         Manual  12/19 12/24    12/3   12/10   DTM and TPR              shoulder mobs      LAD on R x 2-3 mins   lumbar traction x2 mins   2x2 min holds    MWM for shoulder scaption/flexion               Cervical traction 2x2 min holds  2x2 min holds    3x2 min holds   2x2 mins    2-3x1-2 min holds        Manual lumbar traction 2x2 min holds    to R side cervical PS and mid tspine PS x 5 mins   x3-4 mins    x2-3 mins total       to    Exercise Diary  6 7    4   5   UT and LS stretching  manual 2x30 sec B, ROM testing x 3-4 mins  HS and glute/piri stretching 3x30 sec B - demo and practice of self   UT stretching 2x30 sec B    manual UT and LS stretchin x 5 mins total   manual upper quarter x 3-4 mins total   UT/LS and HS stretching - 5 mins total   PROM           Wall slides      no       Pulleys        self sciatic nerve flossing x 15-20 B    review    review    IR/ER isos             Alternating isometrics              Rows/ext  scap sq x 20 total - review and practice   GTB x 10 - review    GTB sh ext x20 total   review    Horizontal abd w/ scap sq   RTB x 20-30 total    supine GTB x 20-30       Posture work   LTR x 20-30 B, self piri stretch 2x30 sec B                 review - x 10 total   review      HEP education and review x 4-5 mins  Update HEP w/ new handout x 5 mins     review             RTB T's x 20   RTB T's x 20         hip abd walking GTB 20'x8, monster walks x 4-5     YTB overhead press x 15-20            yellow tubing pallof press x10-15 B, overhead chops x 10-15 Yellow tubing pallof press x 20-30 B      TrA isos, march, single leg ext x 20-30 each   isos, march x 20 each        bridging w/ ad sq x 20 total B     bridging x 20 total   w/ ad sq x 20       TrA isos, march, single leg ext and 90/90 taps x 15-20 each - 10 min      peanut iso press x 10, w/ march, hip abd, ext x 20-30 each                                                                 Modalities 6 7    4   5   heat   home       pre x 5 mins cspine   no   Ice      pt deferred

## 2019-12-30 NOTE — PRE-PROCEDURE INSTRUCTIONS
Pre-Surgery Instructions:   Medication Instructions    cyclobenzaprine (FLEXERIL) 10 mg tablet Instructed patient per Anesthesia Guidelines   famotidine (PEPCID) 40 MG tablet Instructed patient per Anesthesia Guidelines   omeprazole (PriLOSEC) 40 MG capsule Instructed patient per Anesthesia Guidelines     Pre op,medications and showering instructions reviewed-Patient has hibiclens

## 2019-12-31 ENCOUNTER — TRANSCRIBE ORDERS (OUTPATIENT)
Dept: LAB | Facility: CLINIC | Age: 54
End: 2019-12-31

## 2019-12-31 ENCOUNTER — HOSPITAL ENCOUNTER (OUTPATIENT)
Dept: RADIOLOGY | Facility: HOSPITAL | Age: 54
Discharge: HOME | End: 2019-12-31
Attending: SURGERY
Payer: COMMERCIAL

## 2019-12-31 ENCOUNTER — APPOINTMENT (OUTPATIENT)
Dept: LAB | Facility: CLINIC | Age: 54
End: 2019-12-31
Payer: COMMERCIAL

## 2019-12-31 ENCOUNTER — OFFICE VISIT (OUTPATIENT)
Dept: LAB | Facility: CLINIC | Age: 54
End: 2019-12-31
Payer: COMMERCIAL

## 2019-12-31 ENCOUNTER — OFFICE VISIT (OUTPATIENT)
Dept: PHYSICAL THERAPY | Facility: CLINIC | Age: 54
End: 2019-12-31
Payer: COMMERCIAL

## 2019-12-31 DIAGNOSIS — N63.10 BREAST MASS, RIGHT: ICD-10-CM

## 2019-12-31 DIAGNOSIS — M75.31 CALCIFIC TENDINITIS OF RIGHT SHOULDER: ICD-10-CM

## 2019-12-31 DIAGNOSIS — M75.32 CALCIFIC TENDINITIS OF LEFT SHOULDER: ICD-10-CM

## 2019-12-31 DIAGNOSIS — S46.819A STRAIN OF TRAPEZIUS MUSCLE, UNSPECIFIED LATERALITY, INITIAL ENCOUNTER: Primary | ICD-10-CM

## 2019-12-31 LAB
ALBUMIN SERPL BCP-MCNC: 3.9 G/DL (ref 3.5–5)
ALP SERPL-CCNC: 74 U/L (ref 46–116)
ALT SERPL W P-5'-P-CCNC: 23 U/L (ref 12–78)
ANION GAP SERPL CALCULATED.3IONS-SCNC: 12 MMOL/L (ref 4–13)
AST SERPL W P-5'-P-CCNC: 21 U/L (ref 5–45)
BASOPHILS # BLD AUTO: 0.05 THOUSANDS/ΜL (ref 0–0.1)
BASOPHILS NFR BLD AUTO: 1 % (ref 0–1)
BILIRUB SERPL-MCNC: 0.57 MG/DL (ref 0.2–1)
BUN SERPL-MCNC: 13 MG/DL (ref 5–25)
CALCIUM SERPL-MCNC: 9.7 MG/DL (ref 8.3–10.1)
CHLORIDE SERPL-SCNC: 104 MMOL/L (ref 100–108)
CO2 SERPL-SCNC: 26 MMOL/L (ref 21–32)
CREAT SERPL-MCNC: 0.93 MG/DL (ref 0.6–1.3)
EOSINOPHIL # BLD AUTO: 0.17 THOUSAND/ΜL (ref 0–0.61)
EOSINOPHIL NFR BLD AUTO: 3 % (ref 0–6)
ERYTHROCYTE [DISTWIDTH] IN BLOOD BY AUTOMATED COUNT: 12.6 % (ref 11.6–15.1)
GFR SERPL CREATININE-BSD FRML MDRD: 70 ML/MIN/1.73SQ M
GLUCOSE SERPL-MCNC: 103 MG/DL (ref 65–140)
HCT VFR BLD AUTO: 44.8 % (ref 34.8–46.1)
HGB BLD-MCNC: 14.6 G/DL (ref 11.5–15.4)
IMM GRANULOCYTES # BLD AUTO: 0.02 THOUSAND/UL (ref 0–0.2)
IMM GRANULOCYTES NFR BLD AUTO: 0 % (ref 0–2)
LYMPHOCYTES # BLD AUTO: 2.25 THOUSANDS/ΜL (ref 0.6–4.47)
LYMPHOCYTES NFR BLD AUTO: 39 % (ref 14–44)
MCH RBC QN AUTO: 29.6 PG (ref 26.8–34.3)
MCHC RBC AUTO-ENTMCNC: 32.6 G/DL (ref 31.4–37.4)
MCV RBC AUTO: 91 FL (ref 82–98)
MONOCYTES # BLD AUTO: 0.35 THOUSAND/ΜL (ref 0.17–1.22)
MONOCYTES NFR BLD AUTO: 6 % (ref 4–12)
NEUTROPHILS # BLD AUTO: 2.95 THOUSANDS/ΜL (ref 1.85–7.62)
NEUTS SEG NFR BLD AUTO: 51 % (ref 43–75)
NRBC BLD AUTO-RTO: 0 /100 WBCS
PLATELET # BLD AUTO: 306 THOUSANDS/UL (ref 149–390)
PMV BLD AUTO: 9.3 FL (ref 8.9–12.7)
POTASSIUM SERPL-SCNC: 3.7 MMOL/L (ref 3.5–5.3)
PROT SERPL-MCNC: 8.2 G/DL (ref 6.4–8.2)
RBC # BLD AUTO: 4.93 MILLION/UL (ref 3.81–5.12)
SODIUM SERPL-SCNC: 142 MMOL/L (ref 136–145)
WBC # BLD AUTO: 5.79 THOUSAND/UL (ref 4.31–10.16)

## 2019-12-31 PROCEDURE — 85025 COMPLETE CBC W/AUTO DIFF WBC: CPT

## 2019-12-31 PROCEDURE — 36415 COLL VENOUS BLD VENIPUNCTURE: CPT

## 2019-12-31 PROCEDURE — 97112 NEUROMUSCULAR REEDUCATION: CPT

## 2019-12-31 PROCEDURE — 93005 ELECTROCARDIOGRAM TRACING: CPT

## 2019-12-31 PROCEDURE — 97110 THERAPEUTIC EXERCISES: CPT

## 2019-12-31 PROCEDURE — 80053 COMPREHEN METABOLIC PANEL: CPT

## 2019-12-31 PROCEDURE — 71046 X-RAY EXAM CHEST 2 VIEWS: CPT

## 2019-12-31 NOTE — PROGRESS NOTES
Daily Note     Today's date: 2019  Patient name: Luis Sal  : 1965  MRN: [id number]  Referring provider: Leydi Lima DO  Dx:   Encounter Diagnosis     ICD-10-CM    1  Strain of trapezius muscle, unspecified laterality, initial encounter 95 18 07    2  Calcific tendinitis of left shoulder M75 32    3  Calcific tendinitis of right shoulder M75 31                   Update 2020: Pt chart to be d/c as she has not been in for skilled visits in over a month and has no future appts scheduled  Subjective: Pt reports that pain is better today, 2/10 overall  Feels she is improving  Hasn't had much pain recently  Objective: fatigues quickly w/ cable column sh ext but is able to correct for min form deficits and maintain correct form  Assessment: Tolerated treatment well  Patient demonstrated fatigue post treatment and would benefit from continued PT  Pt reports no adverse sx by end of session  She does well w/ higher level stability work and is appropriate for continued progressions until d/c in middle of next month  Will continue w/ HEP on own  Plan: Continue per plan of care   B ER w/ overhead lift work, tband core stab review      Precautions: hard of hearing, standard         Manual  12/19 12/24 12/31    12/10   DTM and TPR              shoulder mobs       lumbar traction x2 mins   2x2 min holds    MWM for shoulder scaption/flexion               Cervical traction 2x2 min holds  2x2 min holds     2x2 mins    2-3x1-2 min holds        Manual lumbar traction 2x2 min holds     x3-4 mins    x2-3 mins total       to    Exercise Diary  6 7 8    5   UT and LS stretching  manual 2x30 sec B, ROM testing x 3-4 mins  HS and glute/piri stretching 3x30 sec B - demo and practice of self   UT stretching 2x30 sec B    manual UT and LS stretchin x 5 mins total   manual upper quarter x 3-4 mins total   UT/LS and HS stretching - 5 mins total   PROM           Wall slides           Pulleys review    review    IR/ER isos             Alternating isometrics              Rows/ext  scap sq x 20 total - review and practice   Cable columb 20# rows and ext 3x10 each   GTB sh ext x20 total   review    Horizontal abd w/ scap sq   RTB x 20-30 total  BTB regular x 20, overhead for lats x 20       Posture work   LTR x 20-30 B, self piri stretch 2x30 sec B                 review - x 10 total   review      HEP education and review x 4-5 mins  Update HEP w/ new handout x 5 mins  HEP review    review           Seated rows 30# 3x10   RTB T's x 20         hip abd walking GTB 20'x8, monster walks x 4-5               yellow tubing pallof press x10-15 B, overhead chops x 10-15 Yellow tubing pallof press x 20-30 B   review    TrA isos, march, single leg ext x 20-30 each   isos, march x 20 each         bridging w/ ad sq x 20 total B     bridging x 20 total   w/ ad sq x 20       TrA isos, march, single leg ext and 90/90 taps x 15-20 each - 10 min  review     peanut iso press x 10, w/ march, hip abd, ext x 20-30 each          squat lift 20# 2x10-15                                                       Modalities 6 7 8    5   heat   home       pre x 5 mins cspine   no   Ice      pt deferred

## 2020-01-01 LAB
ATRIAL RATE: 82 BPM
P AXIS: 56 DEGREES
PR INTERVAL: 160 MS
QRS AXIS: 42 DEGREES
QRSD INTERVAL: 74 MS
QT INTERVAL: 362 MS
QTC INTERVAL: 422 MS
T WAVE AXIS: 10 DEGREES
VENTRICULAR RATE: 82 BPM

## 2020-01-01 PROCEDURE — 93010 ELECTROCARDIOGRAM REPORT: CPT | Performed by: INTERNAL MEDICINE

## 2020-01-09 DIAGNOSIS — Z91.89 RISK FACTORS FOR OBSTRUCTIVE SLEEP APNEA: Primary | ICD-10-CM

## 2020-01-13 ENCOUNTER — ANESTHESIA EVENT (OUTPATIENT)
Dept: PERIOP | Facility: HOSPITAL | Age: 55
End: 2020-01-13
Payer: COMMERCIAL

## 2020-01-14 ENCOUNTER — ANESTHESIA (OUTPATIENT)
Dept: PERIOP | Facility: HOSPITAL | Age: 55
End: 2020-01-14
Payer: COMMERCIAL

## 2020-01-14 ENCOUNTER — APPOINTMENT (OUTPATIENT)
Dept: MAMMOGRAPHY | Facility: HOSPITAL | Age: 55
End: 2020-01-14
Payer: COMMERCIAL

## 2020-01-14 ENCOUNTER — HOSPITAL ENCOUNTER (OUTPATIENT)
Facility: HOSPITAL | Age: 55
Setting detail: OUTPATIENT SURGERY
Discharge: HOME | End: 2020-01-14
Attending: SURGERY | Admitting: SURGERY
Payer: COMMERCIAL

## 2020-01-14 ENCOUNTER — HOSPITAL ENCOUNTER (OUTPATIENT)
Dept: MAMMOGRAPHY | Facility: HOSPITAL | Age: 55
Discharge: HOME | End: 2020-01-14
Attending: SURGERY
Payer: COMMERCIAL

## 2020-01-14 VITALS
BODY MASS INDEX: 39.31 KG/M2 | TEMPERATURE: 97.4 F | DIASTOLIC BLOOD PRESSURE: 68 MMHG | HEART RATE: 59 BPM | SYSTOLIC BLOOD PRESSURE: 111 MMHG | RESPIRATION RATE: 17 BRPM | WEIGHT: 195 LBS | HEIGHT: 59 IN | OXYGEN SATURATION: 98 %

## 2020-01-14 VITALS — WEIGHT: 195 LBS | BODY MASS INDEX: 39.31 KG/M2 | HEIGHT: 59 IN

## 2020-01-14 DIAGNOSIS — N63.15 BREAST LUMP ON RIGHT SIDE AT 3 O'CLOCK POSITION: ICD-10-CM

## 2020-01-14 DIAGNOSIS — N63.10 BREAST MASS, RIGHT: ICD-10-CM

## 2020-01-14 PROCEDURE — 19301 PARTIAL MASTECTOMY: CPT | Performed by: SURGERY

## 2020-01-14 PROCEDURE — 88307 TISSUE EXAM BY PATHOLOGIST: CPT | Performed by: PATHOLOGY

## 2020-01-14 PROCEDURE — 19281 PERQ DEVICE BREAST 1ST IMAG: CPT

## 2020-01-14 RX ORDER — SODIUM CHLORIDE, SODIUM LACTATE, POTASSIUM CHLORIDE, CALCIUM CHLORIDE 600; 310; 30; 20 MG/100ML; MG/100ML; MG/100ML; MG/100ML
INJECTION, SOLUTION INTRAVENOUS CONTINUOUS PRN
Status: DISCONTINUED | OUTPATIENT
Start: 2020-01-14 | End: 2020-01-14 | Stop reason: SURG

## 2020-01-14 RX ORDER — ONDANSETRON 2 MG/ML
INJECTION INTRAMUSCULAR; INTRAVENOUS AS NEEDED
Status: DISCONTINUED | OUTPATIENT
Start: 2020-01-14 | End: 2020-01-14 | Stop reason: SURG

## 2020-01-14 RX ORDER — LIDOCAINE HYDROCHLORIDE 10 MG/ML
INJECTION, SOLUTION EPIDURAL; INFILTRATION; INTRACAUDAL; PERINEURAL AS NEEDED
Status: DISCONTINUED | OUTPATIENT
Start: 2020-01-14 | End: 2020-01-14 | Stop reason: SURG

## 2020-01-14 RX ORDER — GLYCOPYRROLATE 0.2 MG/ML
INJECTION INTRAMUSCULAR; INTRAVENOUS AS NEEDED
Status: DISCONTINUED | OUTPATIENT
Start: 2020-01-14 | End: 2020-01-14 | Stop reason: SURG

## 2020-01-14 RX ORDER — FENTANYL CITRATE 50 UG/ML
INJECTION, SOLUTION INTRAMUSCULAR; INTRAVENOUS AS NEEDED
Status: DISCONTINUED | OUTPATIENT
Start: 2020-01-14 | End: 2020-01-14 | Stop reason: SURG

## 2020-01-14 RX ORDER — MIDAZOLAM HYDROCHLORIDE 2 MG/2ML
INJECTION, SOLUTION INTRAMUSCULAR; INTRAVENOUS AS NEEDED
Status: DISCONTINUED | OUTPATIENT
Start: 2020-01-14 | End: 2020-01-14 | Stop reason: SURG

## 2020-01-14 RX ORDER — DEXAMETHASONE SODIUM PHOSPHATE 10 MG/ML
INJECTION, SOLUTION INTRAMUSCULAR; INTRAVENOUS AS NEEDED
Status: DISCONTINUED | OUTPATIENT
Start: 2020-01-14 | End: 2020-01-14 | Stop reason: SURG

## 2020-01-14 RX ORDER — OXYCODONE HYDROCHLORIDE AND ACETAMINOPHEN 5; 325 MG/1; MG/1
1 TABLET ORAL EVERY 4 HOURS PRN
Status: DISCONTINUED | OUTPATIENT
Start: 2020-01-14 | End: 2020-01-14 | Stop reason: HOSPADM

## 2020-01-14 RX ORDER — SODIUM CHLORIDE, SODIUM LACTATE, POTASSIUM CHLORIDE, CALCIUM CHLORIDE 600; 310; 30; 20 MG/100ML; MG/100ML; MG/100ML; MG/100ML
125 INJECTION, SOLUTION INTRAVENOUS CONTINUOUS
Status: DISCONTINUED | OUTPATIENT
Start: 2020-01-14 | End: 2020-01-14 | Stop reason: HOSPADM

## 2020-01-14 RX ORDER — CEFAZOLIN SODIUM 2 G/50ML
2000 SOLUTION INTRAVENOUS ONCE
Status: COMPLETED | OUTPATIENT
Start: 2020-01-14 | End: 2020-01-14

## 2020-01-14 RX ORDER — BUPIVACAINE HYDROCHLORIDE 2.5 MG/ML
INJECTION, SOLUTION EPIDURAL; INFILTRATION; INTRACAUDAL AS NEEDED
Status: DISCONTINUED | OUTPATIENT
Start: 2020-01-14 | End: 2020-01-14 | Stop reason: HOSPADM

## 2020-01-14 RX ORDER — ONDANSETRON 2 MG/ML
4 INJECTION INTRAMUSCULAR; INTRAVENOUS ONCE AS NEEDED
Status: DISCONTINUED | OUTPATIENT
Start: 2020-01-14 | End: 2020-01-14 | Stop reason: HOSPADM

## 2020-01-14 RX ORDER — PROPOFOL 10 MG/ML
INJECTION, EMULSION INTRAVENOUS AS NEEDED
Status: DISCONTINUED | OUTPATIENT
Start: 2020-01-14 | End: 2020-01-14 | Stop reason: SURG

## 2020-01-14 RX ORDER — SODIUM CHLORIDE, SODIUM LACTATE, POTASSIUM CHLORIDE, CALCIUM CHLORIDE 600; 310; 30; 20 MG/100ML; MG/100ML; MG/100ML; MG/100ML
100 INJECTION, SOLUTION INTRAVENOUS CONTINUOUS
Status: DISCONTINUED | OUTPATIENT
Start: 2020-01-14 | End: 2020-01-14 | Stop reason: HOSPADM

## 2020-01-14 RX ORDER — LIDOCAINE HYDROCHLORIDE 10 MG/ML
0.5 INJECTION, SOLUTION EPIDURAL; INFILTRATION; INTRACAUDAL; PERINEURAL ONCE AS NEEDED
Status: DISCONTINUED | OUTPATIENT
Start: 2020-01-14 | End: 2020-01-14 | Stop reason: HOSPADM

## 2020-01-14 RX ORDER — LIDOCAINE HYDROCHLORIDE 10 MG/ML
5 INJECTION, SOLUTION EPIDURAL; INFILTRATION; INTRACAUDAL; PERINEURAL ONCE
Status: COMPLETED | OUTPATIENT
Start: 2020-01-14 | End: 2020-01-14

## 2020-01-14 RX ORDER — KETOROLAC TROMETHAMINE 30 MG/ML
INJECTION, SOLUTION INTRAMUSCULAR; INTRAVENOUS AS NEEDED
Status: DISCONTINUED | OUTPATIENT
Start: 2020-01-14 | End: 2020-01-14 | Stop reason: SURG

## 2020-01-14 RX ORDER — FENTANYL CITRATE/PF 50 MCG/ML
25 SYRINGE (ML) INJECTION
Status: DISCONTINUED | OUTPATIENT
Start: 2020-01-14 | End: 2020-01-14 | Stop reason: HOSPADM

## 2020-01-14 RX ORDER — HYDROMORPHONE HCL/PF 1 MG/ML
0.2 SYRINGE (ML) INJECTION
Status: DISCONTINUED | OUTPATIENT
Start: 2020-01-14 | End: 2020-01-14 | Stop reason: HOSPADM

## 2020-01-14 RX ADMIN — ONDANSETRON 4 MG: 2 INJECTION INTRAMUSCULAR; INTRAVENOUS at 09:32

## 2020-01-14 RX ADMIN — FENTANYL CITRATE 50 MCG: 50 INJECTION INTRAMUSCULAR; INTRAVENOUS at 09:56

## 2020-01-14 RX ADMIN — KETOROLAC TROMETHAMINE 15 MG: 30 INJECTION, SOLUTION INTRAMUSCULAR at 10:00

## 2020-01-14 RX ADMIN — LIDOCAINE HYDROCHLORIDE 50 MG: 10 INJECTION, SOLUTION EPIDURAL; INFILTRATION; INTRACAUDAL; PERINEURAL at 09:29

## 2020-01-14 RX ADMIN — FENTANYL CITRATE 25 MCG: 50 INJECTION INTRAMUSCULAR; INTRAVENOUS at 09:42

## 2020-01-14 RX ADMIN — PROPOFOL 200 MG: 10 INJECTION, EMULSION INTRAVENOUS at 09:29

## 2020-01-14 RX ADMIN — CEFAZOLIN SODIUM 2000 MG: 2 SOLUTION INTRAVENOUS at 09:20

## 2020-01-14 RX ADMIN — FENTANYL CITRATE 25 MCG: 50 INJECTION INTRAMUSCULAR; INTRAVENOUS at 09:37

## 2020-01-14 RX ADMIN — SODIUM CHLORIDE, SODIUM LACTATE, POTASSIUM CHLORIDE, AND CALCIUM CHLORIDE: .6; .31; .03; .02 INJECTION, SOLUTION INTRAVENOUS at 07:39

## 2020-01-14 RX ADMIN — MIDAZOLAM HYDROCHLORIDE 2 MG: 1 INJECTION, SOLUTION INTRAMUSCULAR; INTRAVENOUS at 09:22

## 2020-01-14 RX ADMIN — DEXAMETHASONE SODIUM PHOSPHATE 4 MG: 10 INJECTION, SOLUTION INTRAMUSCULAR; INTRAVENOUS at 09:32

## 2020-01-14 RX ADMIN — LIDOCAINE HYDROCHLORIDE 5 ML: 10 INJECTION, SOLUTION EPIDURAL; INFILTRATION; INTRACAUDAL; PERINEURAL at 08:20

## 2020-01-14 RX ADMIN — GLYCOPYRROLATE 0.2 MG: 0.2 INJECTION, SOLUTION INTRAMUSCULAR; INTRAVENOUS at 09:32

## 2020-01-14 NOTE — INTERVAL H&P NOTE
H&P reviewed  After examining the patient I find no changes in the patients condition since the H&P had been written      Vitals:    01/14/20 0728   BP: 147/91   Pulse: 76   Resp: 18   Temp: (!) 97 2 °F (36 2 °C)   SpO2: 96%

## 2020-01-14 NOTE — ANESTHESIA POSTPROCEDURE EVALUATION
Post-Op Assessment Note    CV Status:  Stable    Pain management: adequate     Hydration Status:  Euvolemic   PONV Controlled:  Controlled   Airway Patency:  Patent   Post Op Vitals Reviewed: Yes      Staff: CRNA   Comments: sleeping vss report rn          BP      Temp     Pulse     Resp      SpO2

## 2020-01-14 NOTE — ANESTHESIA PREPROCEDURE EVALUATION
Review of Systems/Medical History  Patient summary reviewed  Chart reviewed  No history of anesthetic complications     Cardiovascular  Negative cardio ROS Exercise tolerance (METS): >4,     Pulmonary  Negative pulmonary ROS        GI/Hepatic    GERD well controlled,        Negative  ROS        Endo/Other    Comment: Hx dysphonia, laryngeal surgery for scar - sxs much improved since surgery and voice quality sounds normal today Obesity (BMI 39)    GYN  Negative gynecology ROS          Hematology  Negative hematology ROS      Musculoskeletal  Back pain , cervical pain,        Neurology    Paresthesias (shoulder and back pain radiating from neck),    Psychology   Negative psychology ROS            Physical Exam    Airway    Mallampati score: II  TM Distance: >3 FB  Neck ROM: limited     Dental   No notable dental hx     Cardiovascular  Comment: Negative ROS,     Pulmonary      Other Findings      Lab Results   Component Value Date    WBC 5 79 12/31/2019    HGB 14 6 12/31/2019     12/31/2019     Lab Results   Component Value Date    SODIUM 142 12/31/2019    K 3 7 12/31/2019    BUN 13 12/31/2019    CREATININE 0 93 12/31/2019    EGFR 70 12/31/2019    GLUCOSE 102 (H) 12/05/2017     Anesthesia Plan  ASA Score- 2     Anesthesia Type- general with ASA Monitors  Additional Monitors:   Airway Plan: LMA  Plan Factors-    Induction- intravenous  Postoperative Plan-     Informed Consent- Anesthetic plan and risks discussed with patient and spouse  I personally reviewed this patient with the CRNA  Discussed and agreed on the Anesthesia Plan with the CRNA  Hilton Rodgers

## 2020-01-14 NOTE — OP NOTE
OPERATIVE REPORT  PATIENT NAME: Suze Nieves    :  1965  MRN: [id number]  Pt Location: [facility] OR ROOM 02    SURGERY DATE: 2020    Surgeon(s) and Role:     * Nav Herrera MD - Primary     * Kodi Bonilla MD - Assisting    Preop Diagnosis:  Breast mass, right [N63 10]    Post-Op Diagnosis Codes:     * Breast mass, right [N63 10]    Procedure(s) (LRB):  BREAST NEEDLE LOCALIZED LUMPECTOMY (NEEDLE LOC AT 0800) (Right)    Specimen(s):  ID Type Source Tests Collected by Time Destination   1 : RIGHT BREAST MASS Tissue Breast, Right TISSUE EXAM Nav Herrera MD 2020        Estimated Blood Loss:   Minimal    Drains:  * No LDAs found *    Anesthesia Type:   General    Operative Indications:  Breast mass, right [N63 10]      Operative Findings:  Good specimen radiograph clinically consistent with fibroadenoma      Complications:   None    Procedure and Technique:  I previously reviewed the needle localization images  Lumpectomy  The patient was brought to the operation room and placed under general anesthesia  Attention was paid to ensure appropriate padding and correct positioning  The right breast was prepped and draped in a sterile fashion  I initiated a time-out, identifying the patient, the correct side and the above procedure  All parties agreed with the time out  The planned incision was then injected with 0 25% Marcaine for local anesthesia  The incision was sharply incised  The localizing wire was used to guide the dissection  Superior, inferior, medial and lateral planes were developed around the localizing wire and the specimen truncated with electrocautery just beyond the tip of the wire  The specimen was then inked for orientation purposes  A specimen radiograph was taken in two dimensions  Superficial bleeding controlled with electrocautery and the wound was extensively irrigated    The wound was closed with two layers, an inner layer of 3-0 vicryl and a subcuticular layer of 4-0 monocryl  Steri-strips were applied  The counts were correct x 2  I was present for the entire case      Patient Disposition:  PACU     SIGNATURE: Heaven Pryor MD  DATE: January 14, 2020  TIME: 10:03 AM

## 2020-01-14 NOTE — DISCHARGE INSTRUCTIONS
POST-OPERATIVE BREAST CARE INSTRUCTIONS    Wound Closure/Dressing: Your wound is closed with:   Steri strips-white pieces of tape that hold your incision together along with surgical glue           Dissolvable sutures-underneath the skin    Wound care:     If you have gauze over your wound you may remove it the day after surgery  Leave the Steri-strips on, they will fall off on their own in 1-2 weeks   You may shower using soap and water to clean your wound  Gently pat dry  Drain Care: If you do not have a drain, disregard this section   Visiting Nursing should have been arranged upon discharge from hospital   A nurse will call your home to schedule an initial visit  Please call the number below if you have not received a call within 48 hours of hospital discharge   You may shower with the BALTA drains  Wash area around the drains with soap and water and pat dry   Record drain outputs on chart given to you at pre op visit and bring that chart to office at post op appt   BALTA drains can be removed in the office by a nurse either at post op visit OR when drain output is 30 ccs or less in a 24 hour period for three days in a row   If you had plastic surgery or reconstructive surgery, the plastic surgeon will manage the drains  Other:       You can begin wearing your own bra when it feels comfortable   You may resume using deodorant the day after surgery                 Post-op visit:  your post-op visit is scheduled for:   @ 11:00 AM    Call our office at [phone] if you have any  of the followin  Redness, swelling, heat, unusual drainage or heavy bleeding from wound  2  Fever greater than 101 °  3  Pain not relieved by medication

## 2020-01-23 PROBLEM — D48.60 PHYLLODES TUMOR OF BREAST: Status: ACTIVE | Noted: 2019-12-17

## 2020-01-27 ENCOUNTER — OFFICE VISIT (OUTPATIENT)
Dept: SURGICAL ONCOLOGY | Facility: CLINIC | Age: 55
End: 2020-01-27

## 2020-01-27 ENCOUNTER — TELEPHONE (OUTPATIENT)
Dept: HEMATOLOGY ONCOLOGY | Facility: CLINIC | Age: 55
End: 2020-01-27

## 2020-01-27 VITALS
HEIGHT: 59 IN | BODY MASS INDEX: 39.31 KG/M2 | HEART RATE: 68 BPM | TEMPERATURE: 97.9 F | DIASTOLIC BLOOD PRESSURE: 78 MMHG | WEIGHT: 195 LBS | RESPIRATION RATE: 16 BRPM | SYSTOLIC BLOOD PRESSURE: 122 MMHG

## 2020-01-27 DIAGNOSIS — D48.60 PHYLLODES TUMOR OF BREAST: Primary | ICD-10-CM

## 2020-01-27 DIAGNOSIS — Z98.890 STATUS POST RIGHT BREAST LUMPECTOMY: ICD-10-CM

## 2020-01-27 PROCEDURE — 99024 POSTOP FOLLOW-UP VISIT: CPT | Performed by: SURGERY

## 2020-01-27 NOTE — H&P (VIEW-ONLY)
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  I have recommended re-excision of all margins with the extent of getting approximately 1 cm of the negative margin  All of this was reviewed with the patient  We went through the process of informed consent for this  We will coordinate the surgery at our next mutual convenience  Cancer History:        Phyllodes tumor of breast    11/25/2019 Biopsy     Right breast biopsy  Cellular fibroepithelial lesion with adjacent cellular atypia    Excisional biopsy recommended  Mass covers 3 2 cm  US right axilla negative  Left breast clear  1/14/2020 Surgery     Right breast needle localized lumpectomy  Low-grade phyllodes tumor  Focal margin involved           Interval History:   See Assessment & Plan    Review of Systems:   Review of Systems   Constitutional: Negative for activity change, appetite change and fatigue  HENT: Negative  Eyes: Negative  Respiratory: Negative for cough, shortness of breath and wheezing  Cardiovascular: Negative for chest pain and leg swelling  Gastrointestinal: Negative  Endocrine: Negative  Genitourinary: Negative  Musculoskeletal:        No new changes or complaints of bone pain   Skin: Negative  Allergic/Immunologic: Negative  Neurological: Negative  Hematological: Negative  Psychiatric/Behavioral: Negative  All other systems reviewed and are negative        Past Medical History     Patient Active Problem List   Diagnosis    Dyspnea on exertion    Dysphonia    Laryngeal stenosis    Cough    Gastroesophageal reflux disease    Reflux laryngitis    Mixed conductive and sensorineural hearing loss of right ear with restricted hearing of left ear    Perforation of right tympanic membrane    Phyllodes tumor of breast     Past Medical History:   Diagnosis Date    Adult-onset obesity     Allergic rhinitis     Disc displacement, cervical     GERD (gastroesophageal reflux disease)     Retinal disorders     last assessed 6/30/14    Tuberculosis, laryngeal     Vitamin D deficiency disease     last assessed 6/30/14     Past Surgical History:   Procedure Laterality Date    BREAST LUMPECTOMY Right 1/14/2020    Procedure: BREAST NEEDLE LOCALIZED LUMPECTOMY (NEEDLE LOC AT 0800);   Surgeon: Landry Hermosillo MD;  [facility];  Service: Surgical Oncology    BREAST SURGERY      EAR SURGERY Left     ear drum repair    MAMMO NEEDLE LOCALIZATION RIGHT (ALL INC) Right 1/14/2020    KS BRONCHOSCOPY,DIAGNOSTIC N/A 10/19/2018    Procedure: BRONCHOSCOPY RIGID;  Surgeon: Edil Silvestre MD;  [facility];  Service: ENT    KS LARYNGOSCOPY,DIRCT,OP SCOP,EXC TUMR N/A 10/19/2018    Procedure: EXCISION LARYNGEAL SCAR; SUPRAGLOTTOPLASTY  POSSIBLE JET VENTILATION; CO2 LASER; COBLATOR;  Surgeon: Edil Silvestre MD;  [facility];  Service: ENT    KS LARYNGOSCOPY,DIRCT,OP,BIOPSY N/A 10/19/2018    Procedure: MICRO DIRECT LARYNGOSCOPY;  Surgeon: Edil Silvestre MD;  [facility];  Service: ENT    US GUIDED BREAST BIOPSY RIGHT COMPLETE Right 11/25/2019     Family History   Problem Relation Age of Onset    Lung cancer Mother 76    Liver cancer Brother      Social History     Socioeconomic History    Marital status: /Civil Union     Spouse name: Not on file    Number of children: Not on file    Years of education: Not on file    Highest education level: Not on file   Occupational History    Not on file   Social Needs    Financial resource strain: Not on file    Food insecurity:     Worry: Not on file     Inability: Not on file    Transportation needs:     Medical: Not on file     Non-medical: Not on file   Tobacco Use    Smoking status: Never Smoker    Smokeless tobacco: Never Used   Substance and Sexual Activity    Alcohol use: No    Drug use: No    Sexual activity: Never     Birth control/protection: Post-menopausal   Lifestyle    Physical activity:     Days per week: Not on file     Minutes per session: Not on file    Stress: Not on file   Relationships    Social connections:     Talks on phone: Not on file     Gets together: Not on file     Attends Zoroastrian service: Not on file     Active member of club or organization: Not on file     Attends meetings of clubs or organizations: Not on file     Relationship status: Not on file    Intimate partner violence:     Fear of current or ex partner: Not on file     Emotionally abused: Not on file     Physically abused: Not on file     Forced sexual activity: Not on file   Other Topics Concern    Not on file   Social History Narrative    Not on file       Current Outpatient Medications:     cyclobenzaprine (FLEXERIL) 10 mg tablet, TAKE 1 TABLET BY MOUTH DAILY AT BEDTIME, Disp: 30 tablet, Rfl: 0    famotidine (PEPCID) 40 MG tablet, Take 1 tablet (40 mg total) by mouth daily, Disp: 90 tablet, Rfl: 0    omeprazole (PriLOSEC) 40 MG capsule, Take 1 capsule (40 mg total) by mouth every morning, Disp: 30 capsule, Rfl: 6    oxyCODONE-acetaminophen (PERCOCET) 5-325 mg per tablet, Take 1 tablet by mouth every 6 (six) hours as needed for moderate painMax Daily Amount: 4 tablets (Patient not taking: Reported on 1/27/2020), Disp: 6 tablet, Rfl: 0  Allergies   Allergen Reactions    Pollen Extract        Physical Exam:     Vitals:    01/27/20 1102   BP: 122/78   Pulse: 68   Resp: 16   Temp: 97 9 °F (36 6 °C)     Physical Exam   Constitutional: She is oriented to person, place, and time  She appears well-developed and well-nourished  HENT:   Head: Normocephalic and atraumatic  Mouth/Throat: Oropharynx is clear and moist    Eyes: Pupils are equal, round, and reactive to light  EOM are normal    Neck: Normal range of motion  Neck supple  No JVD present  No tracheal deviation present  No thyromegaly present  Cardiovascular: Normal rate, regular rhythm, normal heart sounds and intact distal pulses  Exam reveals no gallop and no friction rub  No murmur heard  Pulmonary/Chest: Effort normal and breath sounds normal  No respiratory distress  She has no wheezes  She has no rales  Examination of the right breast demonstrates resolving old hematoma  There is no significant mass though there is some indurated tissue consistent with her previous surgery  There is no axillary adenopathy  Abdominal: Soft  She exhibits no distension and no mass  There is no hepatomegaly  There is no tenderness  There is no rebound and no guarding  Musculoskeletal: Normal range of motion  She exhibits no edema or tenderness  Lymphadenopathy:     She has no cervical adenopathy  Neurological: She is alert and oriented to person, place, and time  No cranial nerve deficit  Skin: Skin is warm and dry  No rash noted  No erythema  Psychiatric: She has a normal mood and affect  Her behavior is normal    Vitals reviewed  Results & Discussion:   I explained the patient regarding phyllodes tumors that they have a tendency to recur locally so we will wide excision is appropriate  I explained radiation therapy occasionally has a role but not lymph node surgery  They also have potential to spread but this is based on their grade  We would have further discussions following her re-excision  Also explained that occasionally if additional tissue still has residual disease then a mastectomy might be entertained    We went through the process of informed consent reviewing the complications as outlined on the consent form as well as recurrence and possible additional treatment  Advance Care Planning/Advance Directives:  I discussed the disease status, treatment plans and follow-up with the patient

## 2020-01-27 NOTE — PROGRESS NOTES
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  I have recommended re-excision of all margins with the extent of getting approximately 1 cm of the negative margin  All of this was reviewed with the patient  We went through the process of informed consent for this  We will coordinate the surgery at our next mutual convenience  Cancer History:        Phyllodes tumor of breast    11/25/2019 Biopsy     Right breast biopsy  Cellular fibroepithelial lesion with adjacent cellular atypia    Excisional biopsy recommended  Mass covers 3 2 cm  US right axilla negative  Left breast clear  1/14/2020 Surgery     Right breast needle localized lumpectomy  Low-grade phyllodes tumor  Focal margin involved           Interval History:   See Assessment & Plan    Review of Systems:   Review of Systems   Constitutional: Negative for activity change, appetite change and fatigue  HENT: Negative  Eyes: Negative  Respiratory: Negative for cough, shortness of breath and wheezing  Cardiovascular: Negative for chest pain and leg swelling  Gastrointestinal: Negative  Endocrine: Negative  Genitourinary: Negative  Musculoskeletal:        No new changes or complaints of bone pain   Skin: Negative  Allergic/Immunologic: Negative  Neurological: Negative  Hematological: Negative  Psychiatric/Behavioral: Negative  All other systems reviewed and are negative        Past Medical History     Patient Active Problem List   Diagnosis    Dyspnea on exertion    Dysphonia    Laryngeal stenosis    Cough    Gastroesophageal reflux disease    Reflux laryngitis    Mixed conductive and sensorineural hearing loss of right ear with restricted hearing of left ear    Perforation of right tympanic membrane    Phyllodes tumor of breast     Past Medical History:   Diagnosis Date    Adult-onset obesity     Allergic rhinitis     Disc displacement, cervical     GERD (gastroesophageal reflux disease)     Retinal disorders     last assessed 6/30/14    Tuberculosis, laryngeal     Vitamin D deficiency disease     last assessed 6/30/14     Past Surgical History:   Procedure Laterality Date    BREAST LUMPECTOMY Right 1/14/2020    Procedure: BREAST NEEDLE LOCALIZED LUMPECTOMY (NEEDLE LOC AT 0800);   Surgeon: Elan Hunt MD;  [facility];  Service: Surgical Oncology    BREAST SURGERY      EAR SURGERY Left     ear drum repair    MAMMO NEEDLE LOCALIZATION RIGHT (ALL INC) Right 1/14/2020    DC BRONCHOSCOPY,DIAGNOSTIC N/A 10/19/2018    Procedure: BRONCHOSCOPY RIGID;  Surgeon: Radha Caro MD;  [facility];  Service: ENT    DC LARYNGOSCOPY,DIRCT,OP SCOP,EXC TUMR N/A 10/19/2018    Procedure: EXCISION LARYNGEAL SCAR; SUPRAGLOTTOPLASTY  POSSIBLE JET VENTILATION; CO2 LASER; COBLATOR;  Surgeon: Radha Caro MD;  [facility];  Service: ENT    DC LARYNGOSCOPY,DIRCT,OP,BIOPSY N/A 10/19/2018    Procedure: MICRO DIRECT LARYNGOSCOPY;  Surgeon: Radha Caro MD;  [facility];  Service: ENT    US GUIDED BREAST BIOPSY RIGHT COMPLETE Right 11/25/2019     Family History   Problem Relation Age of Onset    Lung cancer Mother 76    Liver cancer Brother      Social History     Socioeconomic History    Marital status: /Civil Union     Spouse name: Not on file    Number of children: Not on file    Years of education: Not on file    Highest education level: Not on file   Occupational History    Not on file   Social Needs    Financial resource strain: Not on file    Food insecurity:     Worry: Not on file     Inability: Not on file    Transportation needs:     Medical: Not on file     Non-medical: Not on file   Tobacco Use    Smoking status: Never Smoker    Smokeless tobacco: Never Used   Substance and Sexual Activity    Alcohol use: No    Drug use: No    Sexual activity: Never     Birth control/protection: Post-menopausal   Lifestyle    Physical activity:     Days per week: Not on file     Minutes per session: Not on file    Stress: Not on file   Relationships    Social connections:     Talks on phone: Not on file     Gets together: Not on file     Attends Latter day service: Not on file     Active member of club or organization: Not on file     Attends meetings of clubs or organizations: Not on file     Relationship status: Not on file    Intimate partner violence:     Fear of current or ex partner: Not on file     Emotionally abused: Not on file     Physically abused: Not on file     Forced sexual activity: Not on file   Other Topics Concern    Not on file   Social History Narrative    Not on file       Current Outpatient Medications:     cyclobenzaprine (FLEXERIL) 10 mg tablet, TAKE 1 TABLET BY MOUTH DAILY AT BEDTIME, Disp: 30 tablet, Rfl: 0    famotidine (PEPCID) 40 MG tablet, Take 1 tablet (40 mg total) by mouth daily, Disp: 90 tablet, Rfl: 0    omeprazole (PriLOSEC) 40 MG capsule, Take 1 capsule (40 mg total) by mouth every morning, Disp: 30 capsule, Rfl: 6    oxyCODONE-acetaminophen (PERCOCET) 5-325 mg per tablet, Take 1 tablet by mouth every 6 (six) hours as needed for moderate painMax Daily Amount: 4 tablets (Patient not taking: Reported on 1/27/2020), Disp: 6 tablet, Rfl: 0  Allergies   Allergen Reactions    Pollen Extract        Physical Exam:     Vitals:    01/27/20 1102   BP: 122/78   Pulse: 68   Resp: 16   Temp: 97 9 °F (36 6 °C)     Physical Exam   Constitutional: She is oriented to person, place, and time  She appears well-developed and well-nourished  HENT:   Head: Normocephalic and atraumatic  Mouth/Throat: Oropharynx is clear and moist    Eyes: Pupils are equal, round, and reactive to light  EOM are normal    Neck: Normal range of motion  Neck supple  No JVD present  No tracheal deviation present  No thyromegaly present  Cardiovascular: Normal rate, regular rhythm, normal heart sounds and intact distal pulses  Exam reveals no gallop and no friction rub  No murmur heard  Pulmonary/Chest: Effort normal and breath sounds normal  No respiratory distress  She has no wheezes  She has no rales  Examination of the right breast demonstrates resolving old hematoma  There is no significant mass though there is some indurated tissue consistent with her previous surgery  There is no axillary adenopathy  Abdominal: Soft  She exhibits no distension and no mass  There is no hepatomegaly  There is no tenderness  There is no rebound and no guarding  Musculoskeletal: Normal range of motion  She exhibits no edema or tenderness  Lymphadenopathy:     She has no cervical adenopathy  Neurological: She is alert and oriented to person, place, and time  No cranial nerve deficit  Skin: Skin is warm and dry  No rash noted  No erythema  Psychiatric: She has a normal mood and affect  Her behavior is normal    Vitals reviewed  Results & Discussion:   I explained the patient regarding phyllodes tumors that they have a tendency to recur locally so we will wide excision is appropriate  I explained radiation therapy occasionally has a role but not lymph node surgery  They also have potential to spread but this is based on their grade  We would have further discussions following her re-excision  Also explained that occasionally if additional tissue still has residual disease then a mastectomy might be entertained    We went through the process of informed consent reviewing the complications as outlined on the consent form as well as recurrence and possible additional treatment  Advance Care Planning/Advance Directives:  I discussed the disease status, treatment plans and follow-up with the patient

## 2020-01-27 NOTE — PATIENT INSTRUCTIONS
Pre-Surgery Instructions:   Medication Instructions    cyclobenzaprine (FLEXERIL) 10 mg tablet Stop taking 1 days prior to surgery    famotidine (PEPCID) 40 MG tablet Take morning of surgery    omeprazole (PriLOSEC) 40 MG capsule Take morning of surgery

## 2020-02-03 ENCOUNTER — DOCUMENTATION (OUTPATIENT)
Dept: RADIATION ONCOLOGY | Facility: HOSPITAL | Age: 55
End: 2020-02-03

## 2020-02-03 NOTE — PROGRESS NOTES
As per Catrina Juarez RN, placed a call to the pt  However, there was no answer so a  msg was left requesting the pt return my call as soon as convenient for her

## 2020-02-25 ENCOUNTER — ANESTHESIA EVENT (OUTPATIENT)
Dept: PERIOP | Facility: HOSPITAL | Age: 55
End: 2020-02-25
Payer: COMMERCIAL

## 2020-02-25 ENCOUNTER — HOSPITAL ENCOUNTER (OUTPATIENT)
Facility: HOSPITAL | Age: 55
Setting detail: OUTPATIENT SURGERY
Discharge: HOME | End: 2020-02-25
Attending: SURGERY | Admitting: SURGERY
Payer: COMMERCIAL

## 2020-02-25 ENCOUNTER — ANESTHESIA (OUTPATIENT)
Dept: PERIOP | Facility: HOSPITAL | Age: 55
End: 2020-02-25
Payer: COMMERCIAL

## 2020-02-25 VITALS
OXYGEN SATURATION: 94 % | DIASTOLIC BLOOD PRESSURE: 68 MMHG | HEART RATE: 73 BPM | RESPIRATION RATE: 14 BRPM | TEMPERATURE: 98.2 F | SYSTOLIC BLOOD PRESSURE: 123 MMHG

## 2020-02-25 DIAGNOSIS — D48.60 PHYLLODES TUMOR OF BREAST: ICD-10-CM

## 2020-02-25 PROCEDURE — 88307 TISSUE EXAM BY PATHOLOGIST: CPT | Performed by: PATHOLOGY

## 2020-02-25 PROCEDURE — 19301 PARTIAL MASTECTOMY: CPT | Performed by: SURGERY

## 2020-02-25 RX ORDER — FENTANYL CITRATE/PF 50 MCG/ML
25 SYRINGE (ML) INJECTION
Status: DISCONTINUED | OUTPATIENT
Start: 2020-02-25 | End: 2020-02-25 | Stop reason: HOSPADM

## 2020-02-25 RX ORDER — MIDAZOLAM HYDROCHLORIDE 2 MG/2ML
INJECTION, SOLUTION INTRAMUSCULAR; INTRAVENOUS AS NEEDED
Status: DISCONTINUED | OUTPATIENT
Start: 2020-02-25 | End: 2020-02-25 | Stop reason: SURG

## 2020-02-25 RX ORDER — OXYCODONE HYDROCHLORIDE AND ACETAMINOPHEN 5; 325 MG/1; MG/1
1 TABLET ORAL EVERY 4 HOURS PRN
Status: DISCONTINUED | OUTPATIENT
Start: 2020-02-25 | End: 2020-02-25 | Stop reason: HOSPADM

## 2020-02-25 RX ORDER — PROPOFOL 10 MG/ML
INJECTION, EMULSION INTRAVENOUS AS NEEDED
Status: DISCONTINUED | OUTPATIENT
Start: 2020-02-25 | End: 2020-02-25 | Stop reason: SURG

## 2020-02-25 RX ORDER — CEFAZOLIN SODIUM 2 G/50ML
2000 SOLUTION INTRAVENOUS ONCE
Status: COMPLETED | OUTPATIENT
Start: 2020-02-25 | End: 2020-02-25

## 2020-02-25 RX ORDER — EPHEDRINE SULFATE 50 MG/ML
INJECTION INTRAVENOUS AS NEEDED
Status: DISCONTINUED | OUTPATIENT
Start: 2020-02-25 | End: 2020-02-25 | Stop reason: SURG

## 2020-02-25 RX ORDER — KETOROLAC TROMETHAMINE 30 MG/ML
INJECTION, SOLUTION INTRAMUSCULAR; INTRAVENOUS AS NEEDED
Status: DISCONTINUED | OUTPATIENT
Start: 2020-02-25 | End: 2020-02-25 | Stop reason: SURG

## 2020-02-25 RX ORDER — BUPIVACAINE HYDROCHLORIDE 2.5 MG/ML
INJECTION, SOLUTION EPIDURAL; INFILTRATION; INTRACAUDAL AS NEEDED
Status: DISCONTINUED | OUTPATIENT
Start: 2020-02-25 | End: 2020-02-25 | Stop reason: HOSPADM

## 2020-02-25 RX ORDER — GLYCOPYRROLATE 0.2 MG/ML
INJECTION INTRAMUSCULAR; INTRAVENOUS AS NEEDED
Status: DISCONTINUED | OUTPATIENT
Start: 2020-02-25 | End: 2020-02-25 | Stop reason: SURG

## 2020-02-25 RX ORDER — FENTANYL CITRATE 50 UG/ML
INJECTION, SOLUTION INTRAMUSCULAR; INTRAVENOUS AS NEEDED
Status: DISCONTINUED | OUTPATIENT
Start: 2020-02-25 | End: 2020-02-25 | Stop reason: SURG

## 2020-02-25 RX ORDER — LIDOCAINE HYDROCHLORIDE 10 MG/ML
INJECTION, SOLUTION EPIDURAL; INFILTRATION; INTRACAUDAL; PERINEURAL AS NEEDED
Status: DISCONTINUED | OUTPATIENT
Start: 2020-02-25 | End: 2020-02-25 | Stop reason: SURG

## 2020-02-25 RX ORDER — ONDANSETRON 2 MG/ML
4 INJECTION INTRAMUSCULAR; INTRAVENOUS ONCE AS NEEDED
Status: DISCONTINUED | OUTPATIENT
Start: 2020-02-25 | End: 2020-02-25 | Stop reason: HOSPADM

## 2020-02-25 RX ORDER — ONDANSETRON 2 MG/ML
INJECTION INTRAMUSCULAR; INTRAVENOUS AS NEEDED
Status: DISCONTINUED | OUTPATIENT
Start: 2020-02-25 | End: 2020-02-25 | Stop reason: SURG

## 2020-02-25 RX ORDER — DEXAMETHASONE SODIUM PHOSPHATE 10 MG/ML
INJECTION, SOLUTION INTRAMUSCULAR; INTRAVENOUS AS NEEDED
Status: DISCONTINUED | OUTPATIENT
Start: 2020-02-25 | End: 2020-02-25 | Stop reason: SURG

## 2020-02-25 RX ORDER — SODIUM CHLORIDE, SODIUM LACTATE, POTASSIUM CHLORIDE, CALCIUM CHLORIDE 600; 310; 30; 20 MG/100ML; MG/100ML; MG/100ML; MG/100ML
100 INJECTION, SOLUTION INTRAVENOUS CONTINUOUS
Status: DISCONTINUED | OUTPATIENT
Start: 2020-02-25 | End: 2020-02-25 | Stop reason: HOSPADM

## 2020-02-25 RX ORDER — SODIUM CHLORIDE, SODIUM LACTATE, POTASSIUM CHLORIDE, CALCIUM CHLORIDE 600; 310; 30; 20 MG/100ML; MG/100ML; MG/100ML; MG/100ML
125 INJECTION, SOLUTION INTRAVENOUS CONTINUOUS
Status: DISCONTINUED | OUTPATIENT
Start: 2020-02-25 | End: 2020-02-25 | Stop reason: HOSPADM

## 2020-02-25 RX ORDER — HYDROMORPHONE HCL/PF 1 MG/ML
0.2 SYRINGE (ML) INJECTION
Status: DISCONTINUED | OUTPATIENT
Start: 2020-02-25 | End: 2020-02-25 | Stop reason: HOSPADM

## 2020-02-25 RX ADMIN — EPHEDRINE SULFATE 10 MG: 50 INJECTION, SOLUTION INTRAVENOUS at 13:04

## 2020-02-25 RX ADMIN — ONDANSETRON 4 MG: 2 INJECTION INTRAMUSCULAR; INTRAVENOUS at 12:20

## 2020-02-25 RX ADMIN — DEXAMETHASONE SODIUM PHOSPHATE 4 MG: 10 INJECTION, SOLUTION INTRAMUSCULAR; INTRAVENOUS at 12:20

## 2020-02-25 RX ADMIN — GLYCOPYRROLATE 0.2 MG: 0.2 INJECTION, SOLUTION INTRAMUSCULAR; INTRAVENOUS at 12:15

## 2020-02-25 RX ADMIN — KETOROLAC TROMETHAMINE 15 MG: 30 INJECTION, SOLUTION INTRAMUSCULAR at 12:35

## 2020-02-25 RX ADMIN — LIDOCAINE HYDROCHLORIDE 50 MG: 10 INJECTION, SOLUTION EPIDURAL; INFILTRATION; INTRACAUDAL; PERINEURAL at 12:15

## 2020-02-25 RX ADMIN — SODIUM CHLORIDE, SODIUM LACTATE, POTASSIUM CHLORIDE, AND CALCIUM CHLORIDE: .6; .31; .03; .02 INJECTION, SOLUTION INTRAVENOUS at 11:29

## 2020-02-25 RX ADMIN — FENTANYL CITRATE 25 MCG: 50 INJECTION, SOLUTION INTRAMUSCULAR; INTRAVENOUS at 13:57

## 2020-02-25 RX ADMIN — FENTANYL CITRATE 50 MCG: 50 INJECTION INTRAMUSCULAR; INTRAVENOUS at 12:23

## 2020-02-25 RX ADMIN — PHENYLEPHRINE HYDROCHLORIDE 100 MCG: 10 INJECTION INTRAVENOUS at 13:09

## 2020-02-25 RX ADMIN — FENTANYL CITRATE 50 MCG: 50 INJECTION INTRAMUSCULAR; INTRAVENOUS at 12:37

## 2020-02-25 RX ADMIN — PROPOFOL 200 MG: 10 INJECTION, EMULSION INTRAVENOUS at 12:15

## 2020-02-25 RX ADMIN — FENTANYL CITRATE 25 MCG: 50 INJECTION, SOLUTION INTRAMUSCULAR; INTRAVENOUS at 13:54

## 2020-02-25 RX ADMIN — CEFAZOLIN SODIUM 2000 MG: 2 SOLUTION INTRAVENOUS at 12:10

## 2020-02-25 RX ADMIN — MIDAZOLAM HYDROCHLORIDE 2 MG: 1 INJECTION, SOLUTION INTRAMUSCULAR; INTRAVENOUS at 12:12

## 2020-02-25 NOTE — OP NOTE
OPERATIVE REPORT  PATIENT NAME: Mariluz Plasencia    :  1965  MRN: [id number]  Pt Location: [facility] OR ROOM 02    SURGERY DATE: 2020    Surgeon(s) and Role:     * Bret Patel MD - Primary     * Romi Machado MD - Assisting    Preop Diagnosis:  Phyllodes tumor of breast [D48 60]    Post-Op Diagnosis Codes:     * Phyllodes tumor of breast [D48 60]    Procedure(s) (LRB):  BREAST LUMPECTOMY CAVITY RE-EXCISION (Right)    Specimen(s):  ID Type Source Tests Collected by Time Destination   1 : FINAL ANTERIOR MARGIN - GREEN Tissue Breast, NOS TISSUE EXAM Bret Patel MD 2020 1225    2 : RIGHT BREAST SUPERIOR MARGIN - ORANGE  Tissue Breast, NOS TISSUE EXAM Bret Patel MD 2020 1225    3 : RIGHT BREAST MEDIAL MARGIN - YELLOW Tissue Breast, NOS TISSUE EXAM Bret Patel MD 2020 1225    4 : RIGHT BREAST INFERIOR MARGIN - BLUE (SUPERIOR PORTION) Tissue Breast, NOS TISSUE EXAM Bret Patel MD 2020 1225    5 : RIGHT BREAST INFERIOR MARGIN - BLUE (POSTERIOR PORTION) Tissue Breast, NOS TISSUE Elna Meigs, MD 2020 1225    6 : RIGHT BREAST LATERAL MARGIN - RED Tissue Breast, NOS TISSUE Elna Meigs, MD 2020 1225    7 : RIGHT BREAST POSTERIOR MARGIN - BLACK  Tissue Breast, NOS TISSUE EXAM Bret Patel MD 2020 1225        Estimated Blood Loss:   Minimal    Drains:  * No LDAs found *    Anesthesia Type:   General    Operative Indications:  Phyllodes tumor of breast [D48 60]      Operative Findings:  No residual fluid in cavity, but scare identified, wide margins were taken out from the original skin incision down to posterior region  This was done with approximately 1 cm thick tissue planes in the superior, medial, lateral and inferior (marked as superior and posterior sections) and a final posterior piece  Complications:   None    Procedure and Technique:  I previously reviewed the previous images and pathology reports       Lumpectomy  The patient was brought to the operation room and placed under general anesthesia  Attention was paid to ensure appropriate padding and correct positioning  The right breast was prepped and draped in a sterile fashion  I initiated a time-out, identifying the patient, the correct side and the above procedure  All parties agreed with the time out  The previous incision was then injected with 0 25% Marcaine for local anesthesia and was re-excised and sent as anterior margin  The cavity was explored, there was no residual fluid within the cavity region  Starting at the region the skin, generous planes covering the entire superior, medial, inferior (2 portions) and lateral planes were developed removed and inked in the new margin  The posterior margin was also removed with 1 centimeter thick portions  All of this resection provided complete coverage of the entire region  Superficial bleeding controlled with electrocautery and the wound was extensively irrigated  All of these additional margins were sent for permanent pathologic analysis  The wound was closed with two layers, an inner layer of 3-0 vicryl and a subcuticular layer of 4-0 monocryl  Steri-strips were applied  The counts were correct x 2     I was present for the entire procedure    Patient Disposition:  PACU     SIGNATURE: Pepper Campos MD  DATE: February 25, 2020  TIME: 12:50 PM

## 2020-02-25 NOTE — DISCHARGE INSTRUCTIONS
POST-OPERATIVE BREAST CARE INSTRUCTIONS    Wound Closure/Dressing: Your wound is closed with:   Steri strips-white pieces of tape that hold your incision together along with surgical glue           Dissolvable sutures-underneath the skin    Wound care:     If you have gauze over your wound you may remove it the day after surgery  Leave the Steri-strips on, they will fall off on their own in 1-2 weeks   You may shower using soap and water to clean your wound  Gently pat dry  Drain Care: If you do not have a drain, disregard this section   Visiting Nursing should have been arranged upon discharge from hospital   A nurse will call your home to schedule an initial visit  Please call the number below if you have not received a call within 48 hours of hospital discharge   You may shower with the BALTA drains  Wash area around the drains with soap and water and pat dry   Record drain outputs on chart given to you at pre op visit and bring that chart to office at post op appt   BALTA drains can be removed in the office by a nurse either at post op visit OR when drain output is 30 ccs or less in a 24 hour period for three days in a row   If you had plastic surgery or reconstructive surgery, the plastic surgeon will manage the drains  Other:       You can begin wearing your own bra when it feels comfortable   You may resume using deodorant the day after surgery                 Post-op visit:  your post-op visit is scheduled for:  2020 @ 10:00 AM    Call our office at [phone] if you have any  of the followin  Redness, swelling, heat, unusual drainage or heavy bleeding from wound  2  Fever greater than 101 °  3  Pain not relieved by medication

## 2020-02-25 NOTE — INTERVAL H&P NOTE
H&P reviewed  After examining the patient I find no changes in the patients condition since the H&P had been written      Vitals:    02/25/20 1110   BP: 146/72   Pulse: 68   Resp: 20   Temp: (!) 97 1 °F (36 2 °C)   SpO2: 98%

## 2020-02-25 NOTE — ANESTHESIA PREPROCEDURE EVALUATION
Review of Systems/Medical History  Patient summary reviewed  Chart reviewed      Cardiovascular  Exercise tolerance (METS): >4,     Pulmonary  Shortness of breath,        GI/Hepatic    GERD well controlled,        Negative  ROS        Endo/Other    Obesity  morbid obesity   GYN  Negative gynecology ROS          Hematology  Negative hematology ROS      Musculoskeletal  Negative musculoskeletal ROS        Neurology  Negative neurology ROS      Psychology   Negative psychology ROS              Physical Exam    Airway  Comment: Laryngeal tuberculosis   Mallampati score: II  TM Distance: <3 FB  Neck ROM: full     Dental   No notable dental hx     Cardiovascular  Cardiovascular exam normal    Pulmonary  Pulmonary exam normal     Other Findings        Anesthesia Plan  ASA Score- 3     Anesthesia Type- general with ASA Monitors  Additional Monitors:   Airway Plan: LMA  Plan Factors-  Patient did not smoke on day of surgery  Induction- intravenous  Postoperative Plan-     Informed Consent- Anesthetic plan and risks discussed with patient  I personally reviewed this patient with the CRNA  Discussed and agreed on the Anesthesia Plan with the CRNA  Leo Govea

## 2020-03-16 ENCOUNTER — OFFICE VISIT (OUTPATIENT)
Dept: SURGICAL ONCOLOGY | Facility: CLINIC | Age: 55
End: 2020-03-16

## 2020-03-16 VITALS
RESPIRATION RATE: 16 BRPM | BODY MASS INDEX: 39.31 KG/M2 | HEART RATE: 68 BPM | HEIGHT: 59 IN | TEMPERATURE: 98.1 F | WEIGHT: 195 LBS | DIASTOLIC BLOOD PRESSURE: 80 MMHG | SYSTOLIC BLOOD PRESSURE: 114 MMHG

## 2020-03-16 DIAGNOSIS — Z98.890 STATUS POST RIGHT BREAST LUMPECTOMY: Primary | ICD-10-CM

## 2020-03-16 DIAGNOSIS — D48.60 PHYLLODES TUMOR OF BREAST: ICD-10-CM

## 2020-03-16 PROCEDURE — 3008F BODY MASS INDEX DOCD: CPT | Performed by: SURGERY

## 2020-03-16 PROCEDURE — 99024 POSTOP FOLLOW-UP VISIT: CPT | Performed by: SURGERY

## 2020-03-16 NOTE — PROGRESS NOTES
Surgical Oncology Breast Post-Op       [address] [facility] SURGICAL ONCOLOGY [place]  [address]  [place] [address] 55  8/[birth year]0720  [id number]  [address] [facility] SURGICAL ONCOLOGY [facility] [id number]    Chief Complaint:   Yumiko Lima is seen for a post-operative visit of her recent breast surgery  Oncology History:        Phyllodes tumor of breast    11/25/2019 Biopsy     Right breast biopsy  Cellular fibroepithelial lesion with adjacent cellular atypia    Excisional biopsy recommended  Mass covers 3 2 cm  US right axilla negative  Left breast clear  1/14/2020 Surgery     Right breast needle localized lumpectomy  Low-grade phyllodes tumor  Focal margin involved      2/25/2020 Surgery     Right breast re-excision lumpectomy margins  Benign breast tissue with organizing fat necrosis, features compatible with prior biopsy site changes  Fibrocystic changes including usual ductal hyperplasia  No tumor identified         Assessment & Plan:   Assessment/Plan    The patient is re-excision showed no evidence of residual phyllodes tumor  I have recommended we see her twice a year with clinical exams as well as mammograms once ER over 3 year  As outlined by the NCCN guidelines  History of Present Illness:   See Oncology History    Interval History:   Patient tolerated her surgery quite well  Her wound is healing well  Review of Systems:   Review of Systems   All other systems reviewed and are negative        Past Medical History:     Patient Active Problem List   Diagnosis    Dyspnea on exertion    Dysphonia    Laryngeal stenosis    Cough    Gastroesophageal reflux disease    Reflux laryngitis    Mixed conductive and sensorineural hearing loss of right ear with restricted hearing of left ear    Perforation of right tympanic membrane    Phyllodes tumor of breast     Past Medical History:   Diagnosis Date    Adult-onset obesity     Allergic rhinitis     Disc displacement, cervical     GERD (gastroesophageal reflux disease)     Retinal disorders     last assessed 6/30/14    Tuberculosis, laryngeal     Vitamin D deficiency disease     last assessed 6/30/14     Past Surgical History:   Procedure Laterality Date    BREAST LUMPECTOMY Right 1/14/2020    Procedure: BREAST NEEDLE LOCALIZED LUMPECTOMY (NEEDLE LOC AT 0800);   Surgeon: Ryann Emery MD;  [facility];  Service: Surgical Oncology    BREAST LUMPECTOMY Right 2/25/2020    Procedure: BREAST LUMPECTOMY CAVITY RE-EXCISION;  Surgeon: Ryann Emery MD;  [facility];  Service: Surgical Oncology    BREAST SURGERY      EAR SURGERY Left     ear drum repair    MAMMO NEEDLE LOCALIZATION RIGHT (ALL INC) Right 1/14/2020    [place] BRONCHOSCOPY,DIAGNOSTIC N/A 10/19/2018    Procedure: BRONCHOSCOPY RIGID;  Surgeon: Bella Muñoz MD;  [facility];  Service: ENT    [place] LARYNGOSCOPY,DIRCT,OP [address] N/A 10/19/2018    Procedure: EXCISION LARYNGEAL SCAR; SUPRAGLOTTOPLASTY  POSSIBLE JET VENTILATION; CO2 LASER; COBLATOR;  Surgeon: Bella Muñoz MD;  [facility];  Service: ENT    [place] LARYNGOSCOPY,DIRCT,OP,BIOPSY N/A 10/19/2018    Procedure: MICRO DIRECT LARYNGOSCOPY;  Surgeon: Bella Muñoz MD;  [facility];  Service: ENT    US GUIDED BREAST BIOPSY RIGHT COMPLETE Right 11/25/2019     Family History   Problem Relation Age of Onset    Lung cancer Mother 76    Liver cancer Brother      Social History     Socioeconomic History    Marital status: /Civil Union     Spouse name: Not on file    Number of children: Not on file    Years of education: Not on file    Highest education level: Not on file   Occupational History    Not on file   Social Needs    Financial resource strain: Not on file    Food insecurity:     Worry: Not on file     Inability: Not on file    Transportation needs:     Medical: Not on file Non-medical: Not on file   Tobacco Use    Smoking status: Never Smoker    Smokeless tobacco: Never Used   Substance and Sexual Activity    Alcohol use: No    Drug use: No    Sexual activity: Never     Birth control/protection: Post-menopausal   Lifestyle    Physical activity:     Days per week: Not on file     Minutes per session: Not on file    Stress: Not on file   Relationships    Social connections:     Talks on phone: Not on file     Gets together: Not on file     Attends [employer] service: Not on file     Active member of club or organization: Not on file     Attends meetings of clubs or organizations: Not on file     Relationship status: Not on file    Intimate partner violence:     Fear of current or ex partner: Not on file     Emotionally abused: Not on file     Physically abused: Not on file     Forced sexual activity: Not on file   Other Topics Concern    Not on file   Social History Narrative    Not on file       Current Outpatient Medications:     cyclobenzaprine (FLEXERIL) 10 mg tablet, TAKE 1 TABLET BY MOUTH DAILY AT BEDTIME, Disp: 30 tablet, Rfl: 0    famotidine (PEPCID) 40 MG tablet, Take 1 tablet (40 mg total) by mouth daily, Disp: 90 tablet, Rfl: 0    omeprazole (PriLOSEC) 40 MG capsule, Take 1 capsule (40 mg total) by mouth every morning, Disp: 30 capsule, Rfl: 1    oxyCODONE-acetaminophen (PERCOCET) 5-325 mg per tablet, Take 1 tablet by mouth every 6 (six) hours as needed for moderate painMax Daily Amount: 4 tablets (Patient not taking: Reported on 3/16/2020), Disp: 6 tablet, Rfl: 0  Allergies   Allergen Reactions    Pollen Extract        Physical Exams:     Vitals:    03/16/20 1004   BP: 114/80   Pulse: 68   Resp: 16   Temp: 98 1 °F (36 7 °C)     Physical Exam   Pulmonary/Chest:   Examination of the right breast wound demonstrate no evidence of infection hematoma or seroma             Results & Discussion:   The patient has a very good cosmetic outcome considering relatively extensive re-excision  There is no residual phyllodes tumor  Overall she has a good prognosis, however recurrence is still possible consequently we will see her twice ears outlined above and the patient should contact us should she appreciate any changes prior to that  She is agreeable to see our advanced practitioner

## 2020-05-19 DIAGNOSIS — K21.9 GASTROESOPHAGEAL REFLUX DISEASE WITHOUT ESOPHAGITIS: ICD-10-CM

## 2020-05-19 RX ORDER — FAMOTIDINE 40 MG/1
TABLET, FILM COATED ORAL
Qty: 90 TABLET | Refills: 0 | Status: SHIPPED | OUTPATIENT
Start: 2020-05-19 | End: 2021-03-30

## 2020-07-07 ENCOUNTER — OFFICE VISIT (OUTPATIENT)
Dept: OBGYN CLINIC | Facility: CLINIC | Age: 55
End: 2020-07-07
Payer: COMMERCIAL

## 2020-07-07 VITALS
BODY MASS INDEX: 36.29 KG/M2 | HEIGHT: 59 IN | WEIGHT: 180 LBS | HEART RATE: 75 BPM | TEMPERATURE: 98.3 F | SYSTOLIC BLOOD PRESSURE: 112 MMHG | DIASTOLIC BLOOD PRESSURE: 71 MMHG

## 2020-07-07 DIAGNOSIS — G89.29 CHRONIC LEFT SHOULDER PAIN: Primary | ICD-10-CM

## 2020-07-07 DIAGNOSIS — M75.32 CALCIFIC TENDINITIS OF LEFT SHOULDER: ICD-10-CM

## 2020-07-07 DIAGNOSIS — M25.512 CHRONIC LEFT SHOULDER PAIN: Primary | ICD-10-CM

## 2020-07-07 PROCEDURE — 99213 OFFICE O/P EST LOW 20 MIN: CPT | Performed by: ORTHOPAEDIC SURGERY

## 2020-07-07 PROCEDURE — 3008F BODY MASS INDEX DOCD: CPT | Performed by: ORTHOPAEDIC SURGERY

## 2020-07-07 PROCEDURE — 1036F TOBACCO NON-USER: CPT | Performed by: ORTHOPAEDIC SURGERY

## 2020-07-07 RX ORDER — NAPROXEN 500 MG/1
500 TABLET ORAL 2 TIMES DAILY WITH MEALS
Qty: 60 TABLET | Refills: 0 | Status: SHIPPED | OUTPATIENT
Start: 2020-07-07 | End: 2020-07-30

## 2020-07-07 NOTE — PROGRESS NOTES
Assessment/Plan:  1  Chronic left shoulder pain  MRI shoulder left wo contrast    naproxen (NAPROSYN) 500 mg tablet   2  Calcific tendinitis of left shoulder  naproxen (NAPROSYN) 500 mg tablet       Magali Walsh has left-sided shoulder pain which appears to be chronic and she is losing some range of motion  She does have some weakness in the left shoulder on exam today  I would like an MRI of the left shoulder to rule out rotator cuff tear  I did offer her left subacromial cortisone injection today but she would like to hold off until after the MRI  I also given the option of returning to physical therapy which she declined  I will see her in the office after the MRI is complete for repeat evaluation  We will try a short course of anti-inflammatories to see if this helps decrease her pain  Subjective:   Savi Barrera is a 54 y o  female who presents to the office for follow-up for left-sided shoulder pain  I last saw her 1 year ago with bilateral shoulder pain consistent with calcific tendonitis/subacromial bursitis  At that time she was experiencing more right shoulder discomfort we did provide her a right subacromial bursa injection  We sent her for physical therapy for both shoulders as well  She completed physical therapy but states that her shoulder never felt better  She did have other surgeries at that time which were pre occupying a lot of her care and concentration  She states the shoulders have never improved  She is also delayed coming in due to the recent COVID outbreak  She now states that the left shoulder pain has been worsening and she feels a lot of stiffness and lack of range of motion  She has pain in the evening and weakness associated with left shoulder  The right does not seem to be bothering her that much anymore  She denies any new injury  She is frustrated of her lack of improvement over the last year        Review of Systems   Constitutional: Negative for chills, fever and unexpected weight change  HENT: Negative for hearing loss, nosebleeds and sore throat  Eyes: Negative for pain, redness and visual disturbance  Respiratory: Negative for cough, shortness of breath and wheezing  Cardiovascular: Negative for chest pain, palpitations and leg swelling  Gastrointestinal: Negative for abdominal pain, nausea and vomiting  Endocrine: Negative for polydipsia and polyuria  Genitourinary: Negative for dysuria and hematuria  Musculoskeletal:        See HPI   Skin: Negative for rash and wound  Neurological: Negative for dizziness, numbness and headaches  Psychiatric/Behavioral: Negative for decreased concentration and suicidal ideas  The patient is not nervous/anxious  Past Medical History:   Diagnosis Date    Adult-onset obesity     Allergic rhinitis     Disc displacement, cervical     GERD (gastroesophageal reflux disease)     Retinal disorders     last assessed 6/30/14    Tuberculosis, laryngeal     Vitamin D deficiency disease     last assessed 6/30/14       Past Surgical History:   Procedure Laterality Date    BREAST LUMPECTOMY Right 1/14/2020    Procedure: BREAST NEEDLE LOCALIZED LUMPECTOMY (NEEDLE LOC AT 0800);   Surgeon: Helene Moscoso MD;  [facility];  Service: Surgical Oncology    BREAST LUMPECTOMY Right 2/25/2020    Procedure: BREAST LUMPECTOMY CAVITY RE-EXCISION;  Surgeon: Helene Moscoso MD;  [facility];  Service: Surgical Oncology    BREAST SURGERY      EAR SURGERY Left     ear drum repair    MAMMO NEEDLE LOCALIZATION RIGHT (ALL INC) Right 1/14/2020    [place] BRONCHOSCOPY,DIAGNOSTIC N/A 10/19/2018    Procedure: BRONCHOSCOPY RIGID;  Surgeon: Maria Fernanda Saini MD;  [facility];  Service: ENT    [place] LARY[address]OSCOPY,DIRCT,OP [address] N/A 10/19/2018    Procedure: EXCISION LARYNGEAL SCAR; SUPRAGLOTTOPLASTY  POSSIBLE JET VENTILATION; CO2 LASER; COBLATOR;  Surgeon: Maria Fernanda Saini MD;  [facility];  Service: ENT    [place] LARYNGOSCOPY,DIRCT,OP,BIOPSY N/A 10/19/2018    Procedure: MICRO DIRECT LARYNGOSCOPY;  Surgeon: Richard Roland MD;  [facility];  Service: ENT    US GUIDED BREAST BIOPSY RIGHT COMPLETE Right 11/25/2019       Family History   Problem Relation Age of Onset    Lung cancer Mother 76    Liver cancer Brother        Social History     Occupational History    Not on file   Tobacco Use    Smoking status: Never Smoker    Smokeless tobacco: Never Used   Substance and Sexual Activity    Alcohol use: No    Drug use: No    Sexual activity: Never     Birth control/protection: Post-menopausal         Current Outpatient Medications:     famotidine (PEPCID) 40 MG tablet, TAKE 1 TABLET BY MOUTH EVERY DAY, Disp: 90 tablet, Rfl: 0    omeprazole (PriLOSEC) 40 MG capsule, Take 1 capsule (40 mg total) by mouth every morning, Disp: 30 capsule, Rfl: 1    cyclobenzaprine (FLEXERIL) 10 mg tablet, TAKE 1 TABLET BY MOUTH DAILY AT BEDTIME (Patient not taking: Reported on 7/7/2020), Disp: 30 tablet, Rfl: 0    oxyCODONE-acetaminophen (PERCOCET) 5-325 mg per tablet, Take 1 tablet by mouth every 6 (six) hours as needed for moderate painMax Daily Amount: 4 tablets (Patient not taking: Reported on 3/16/2020), Disp: 6 tablet, Rfl: 0    Allergies   Allergen Reactions    Pollen Extract        Objective:  Vitals:    07/07/20 1523   BP: 112/71   Pulse: 75   Temp: 98 3 °F (36 8 °C)       Right Shoulder Exam     Tenderness   The patient is experiencing no tenderness      Range of Motion   Active abduction: normal   Passive abduction: normal   Extension: normal   External rotation: normal   Forward flexion: normal   Internal rotation 0 degrees: normal     Muscle Strength   Abduction: 5/5   Internal rotation: 5/5   External rotation: 5/5   Supraspinatus: 5/5   Subscapularis: 5/5   Biceps: 5/5     Tests   Grayson test: negative  Impingement: negative    Other   Erythema: absent  Sensation: normal  Pulse: present      Left Shoulder Exam Tenderness   The patient is experiencing tenderness in the acromioclavicular joint and biceps tendon  Range of Motion   Active abduction:  160 abnormal   Passive abduction:  160 abnormal   Extension: abnormal   External rotation:  80 abnormal   Forward flexion:  160 abnormal   Internal rotation 0 degrees:  Sacrum abnormal     Muscle Strength   Abduction: 3/5   Internal rotation: 5/5   External rotation: 4/5   Supraspinatus: 3/5   Subscapularis: 5/5   Biceps: 5/5     Tests   Grayson test: positive  Impingement: positive  Drop arm: negative    Other   Erythema: absent  Sensation: normal  Pulse: present             Physical Exam   Constitutional: She is oriented to person, place, and time  She appears well-developed and well-nourished  HENT:   Head: Normocephalic and atraumatic  Eyes: Pupils are equal, round, and reactive to light  Conjunctivae are normal    Neck: Normal range of motion  Neck supple  Cardiovascular: Normal rate and intact distal pulses  Pulmonary/Chest: Effort normal  No respiratory distress  Musculoskeletal:   As noted in HPI   Neurological: She is alert and oriented to person, place, and time  Skin: Skin is warm and dry  Psychiatric: She has a normal mood and affect  Her behavior is normal    Nursing note and vitals reviewed

## 2020-07-14 ENCOUNTER — HOSPITAL ENCOUNTER (OUTPATIENT)
Dept: RADIOLOGY | Facility: HOSPITAL | Age: 55
Discharge: HOME | End: 2020-07-14
Attending: ORTHOPAEDIC SURGERY
Payer: COMMERCIAL

## 2020-07-14 DIAGNOSIS — M25.512 CHRONIC LEFT SHOULDER PAIN: ICD-10-CM

## 2020-07-14 DIAGNOSIS — G89.29 CHRONIC LEFT SHOULDER PAIN: ICD-10-CM

## 2020-07-14 PROCEDURE — 73221 MRI JOINT UPR EXTREM W/O DYE: CPT

## 2020-07-17 ENCOUNTER — OFFICE VISIT (OUTPATIENT)
Dept: OBGYN CLINIC | Facility: CLINIC | Age: 55
End: 2020-07-17
Payer: COMMERCIAL

## 2020-07-17 VITALS
HEART RATE: 65 BPM | WEIGHT: 180 LBS | BODY MASS INDEX: 36.29 KG/M2 | SYSTOLIC BLOOD PRESSURE: 118 MMHG | TEMPERATURE: 98 F | DIASTOLIC BLOOD PRESSURE: 83 MMHG | HEIGHT: 59 IN

## 2020-07-17 DIAGNOSIS — M67.819 TENDINOSIS OF ROTATOR CUFF: Primary | ICD-10-CM

## 2020-07-17 PROCEDURE — 20610 DRAIN/INJ JOINT/BURSA W/O US: CPT | Performed by: ORTHOPAEDIC SURGERY

## 2020-07-17 PROCEDURE — 1036F TOBACCO NON-USER: CPT | Performed by: ORTHOPAEDIC SURGERY

## 2020-07-17 PROCEDURE — 3008F BODY MASS INDEX DOCD: CPT | Performed by: ORTHOPAEDIC SURGERY

## 2020-07-17 PROCEDURE — 99213 OFFICE O/P EST LOW 20 MIN: CPT | Performed by: ORTHOPAEDIC SURGERY

## 2020-07-17 RX ORDER — DEXAMETHASONE SODIUM PHOSPHATE 100 MG/10ML
40 INJECTION INTRAMUSCULAR; INTRAVENOUS
Status: COMPLETED | OUTPATIENT
Start: 2020-07-17 | End: 2020-07-17

## 2020-07-17 RX ORDER — LIDOCAINE HYDROCHLORIDE 10 MG/ML
4 INJECTION, SOLUTION INFILTRATION; PERINEURAL
Status: COMPLETED | OUTPATIENT
Start: 2020-07-17 | End: 2020-07-17

## 2020-07-17 RX ADMIN — DEXAMETHASONE SODIUM PHOSPHATE 40 MG: 100 INJECTION INTRAMUSCULAR; INTRAVENOUS at 11:02

## 2020-07-17 RX ADMIN — LIDOCAINE HYDROCHLORIDE 4 ML: 10 INJECTION, SOLUTION INFILTRATION; PERINEURAL at 11:02

## 2020-07-17 NOTE — PROGRESS NOTES
Assessment/Plan:  1  Tendinosis of rotator cuff  Ambulatory referral to Physical Therapy    Large joint arthrocentesis: L subacromial bursa       Dee Elder has left-sided shoulder pain and an MRI showing tendinosis of the rotator cuff  There is no evidence of tear on MRI  We did discuss conservative measures of physical therapy and cortisone injection as well as continued anti-inflammatories today  She was agreeable to all these we did provide her with a left cortisone injection into her shoulder and send her for physical therapy  I will see her back in 6 weeks for repeat evaluation  Subjective:   Ray Sanford is a 54 y o  female who presents for follow-up for left-sided shoulder pain  At last office visit she was having ongoing left shoulder pain for the past year  We sent her for an MRI and she returns for those results today  She continues to have pain that is aching and throbbing constant worsens with overhead movement  She denies any acute injury  She denies any additional symptoms since she was last seen  Review of Systems   Constitutional: Negative for chills, fever and unexpected weight change  HENT: Negative for hearing loss, nosebleeds and sore throat  Eyes: Negative for pain, redness and visual disturbance  Respiratory: Negative for cough, shortness of breath and wheezing  Cardiovascular: Negative for chest pain, palpitations and leg swelling  Gastrointestinal: Negative for abdominal pain, nausea and vomiting  Endocrine: Negative for polydipsia and polyuria  Genitourinary: Negative for dysuria and hematuria  Musculoskeletal:        See HPI   Skin: Negative for rash and wound  Neurological: Negative for dizziness, numbness and headaches  Psychiatric/Behavioral: Negative for decreased concentration and suicidal ideas  The patient is not nervous/anxious            Past Medical History:   Diagnosis Date    Adult-onset obesity     Allergic rhinitis     Disc displacement, cervical     GERD (gastroesophageal reflux disease)     Retinal disorders     last assessed 6/30/14    Tuberculosis, laryngeal     Vitamin D deficiency disease     last assessed 6/30/14       Past Surgical History:   Procedure Laterality Date    BREAST LUMPECTOMY Right 1/14/2020    Procedure: BREAST NEEDLE LOCALIZED LUMPECTOMY (NEEDLE LOC AT 0800);   Surgeon: Manjula Gomez MD;  [facility];  Service: Surgical Oncology    BREAST LUMPECTOMY Right 2/25/2020    Procedure: BREAST LUMPECTOMY CAVITY RE-EXCISION;  Surgeon: Manjula Gomez MD;  [facility];  Service: Surgical Oncology    BREAST SURGERY      EAR SURGERY Left     ear drum repair    MAMMO NEEDLE LOCALIZATION RIGHT (ALL INC) Right 1/14/2020    [place] BRONCHOSCOPY,DIAGNOSTIC N/A 10/19/2018    Procedure: BRONCHOSCOPY RIGID;  Surgeon: Everette Beasley MD;  [facility];  Service: ENT    [place] LARYNGOSCOPY,DIRCT,OP SCOP,EXC TUMR N/A 10/19/2018    Procedure: EXCISION LARYNGEAL SCAR; SUPRAGLOTTOPLASTY  POSSIBLE JET VENTILATION; CO2 LASER; COBLATOR;  Surgeon: Everette Beasley MD;  [facility];  Service: ENT    [place] LARYNGOSCOPY,DIRCT,OP,BIOPSY N/A 10/19/2018    Procedure: MICRO DIRECT LARYNGOSCOPY;  Surgeon: Everette Beasley MD;  [facility];  Service: ENT    US GUIDED BREAST BIOPSY RIGHT COMPLETE Right 11/25/2019       Family History   Problem Relation Age of Onset    Lung cancer Mother 76    Liver cancer Brother        Social History     Occupational History    Not on file   Tobacco Use    Smoking status: Never Smoker    Smokeless tobacco: Never Used   Substance and Sexual Activity    Alcohol use: No    Drug use: No    Sexual activity: Never     Birth control/protection: Post-menopausal         Current Outpatient Medications:     famotidine (PEPCID) 40 MG tablet, TAKE 1 TABLET BY MOUTH EVERY DAY, Disp: 90 tablet, Rfl: 0    naproxen (NAPROSYN) 500 mg tablet, Take 1 tablet (500 mg total) by mouth 2 (two) times a day with meals, Disp: 60 tablet, Rfl: 0    omeprazole (PriLOSEC) 40 MG capsule, Take 1 capsule (40 mg total) by mouth every morning, Disp: 30 capsule, Rfl: 1    Allergies   Allergen Reactions    Pollen Extract        Objective:  Vitals:    07/17/20 1000   BP: 118/83   Pulse: 65   Temp: 98 °F (36 7 °C)       Left Shoulder Exam     Range of Motion   Active abduction:  130 abnormal   Passive abduction:  140 abnormal   Extension: normal   External rotation: normal   Forward flexion:  130 abnormal   Internal rotation 0 degrees:  Sacrum abnormal     Muscle Strength   Abduction: 5/5   Internal rotation: 5/5   External rotation: 5/5   Supraspinatus: 5/5   Subscapularis: 5/5   Biceps: 5/5     Tests   Grayson test: negative  Impingement: negative    Other   Erythema: absent  Sensation: normal  Pulse: present             Physical Exam   Constitutional: She is oriented to person, place, and time  She appears well-developed and well-nourished  HENT:   Head: Normocephalic and atraumatic  Eyes: Pupils are equal, round, and reactive to light  Conjunctivae are normal    Neck: Normal range of motion  Neck supple  Cardiovascular: Normal rate and intact distal pulses  Pulmonary/Chest: Effort normal  No respiratory distress  Musculoskeletal:   As noted in HPI   Neurological: She is alert and oriented to person, place, and time  Skin: Skin is warm and dry  Psychiatric: She has a normal mood and affect  Her behavior is normal    Nursing note and vitals reviewed  I have personally reviewed pertinent films in PACS and my interpretation is as follows:  MRI of the left shoulder demonstrates tendinosis of the supraspinatus and infraspinatus tendon  No evidence of tear      Large joint arthrocentesis: L subacromial bursa  Date/Time: 7/17/2020 11:02 AM  Consent given by: patient  Site marked: site marked  Timeout: Immediately prior to procedure a time out was called to verify the correct patient, procedure, equipment, support staff and site/side marked as required   Supporting Documentation  Indications: pain   Procedure Details  Location: shoulder - L subacromial bursa  Preparation: Patient was prepped and draped in the usual sterile fashion  Needle size: 22 G  Ultrasound guidance: no  Approach: posterior  Medications administered: 40 mg dexamethasone 100 mg/10 mL; 4 mL lidocaine 1 %    Patient tolerance: patient tolerated the procedure well with no immediate complications  Dressing:  Sterile dressing applied

## 2020-07-30 DIAGNOSIS — G89.29 CHRONIC LEFT SHOULDER PAIN: ICD-10-CM

## 2020-07-30 DIAGNOSIS — M75.32 CALCIFIC TENDINITIS OF LEFT SHOULDER: ICD-10-CM

## 2020-07-30 DIAGNOSIS — M25.512 CHRONIC LEFT SHOULDER PAIN: ICD-10-CM

## 2020-07-30 RX ORDER — NAPROXEN 500 MG/1
TABLET ORAL
Qty: 60 TABLET | Refills: 0 | Status: SHIPPED | OUTPATIENT
Start: 2020-07-30 | End: 2021-03-30

## 2020-08-13 DIAGNOSIS — K21.9 GASTROESOPHAGEAL REFLUX DISEASE WITHOUT ESOPHAGITIS: ICD-10-CM

## 2020-09-24 ENCOUNTER — OFFICE VISIT (OUTPATIENT)
Dept: SURGICAL ONCOLOGY | Facility: CLINIC | Age: 55
End: 2020-09-24
Payer: COMMERCIAL

## 2020-09-24 VITALS
RESPIRATION RATE: 16 BRPM | HEIGHT: 59 IN | TEMPERATURE: 98.5 F | HEART RATE: 68 BPM | BODY MASS INDEX: 39.61 KG/M2 | SYSTOLIC BLOOD PRESSURE: 120 MMHG | DIASTOLIC BLOOD PRESSURE: 82 MMHG | WEIGHT: 196.5 LBS

## 2020-09-24 DIAGNOSIS — D48.60 PHYLLODES TUMOR OF BREAST: Primary | ICD-10-CM

## 2020-09-24 PROBLEM — Z08 ENCOUNTER FOR FOLLOW-UP EXAMINATION AFTER COMPLETED TREATMENT FOR MALIGNANT NEOPLASM: Status: ACTIVE | Noted: 2020-09-24

## 2020-09-24 PROCEDURE — 99213 OFFICE O/P EST LOW 20 MIN: CPT | Performed by: NURSE PRACTITIONER

## 2020-09-24 PROCEDURE — 1036F TOBACCO NON-USER: CPT | Performed by: NURSE PRACTITIONER

## 2020-09-24 RX ORDER — FEXOFENADINE HCL 180 MG/1
180 TABLET ORAL DAILY
COMMUNITY

## 2020-09-24 NOTE — PROGRESS NOTES
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  She underwent a right lumpectomy with re-excision of margins with Dr Dominguez Hanson in February 2020  She has no new complaints today and there are no concerns on today's exam   I will order a mammogram for next month and we will plan to see the patient back in 6 months or sooner should the need arise  She was instructed to call with any new concerns or symptoms prior to that time  History of Present Illness:     -Interval History:  Patient presents today for a follow-up visit for a right breast phyllodes tumor  She states the surgical site is numb and she gets occasional sharp shooting pains in her breast   She notices no changes on self-breast exam     Review of Systems:  Review of Systems   Constitutional: Negative for activity change, appetite change, chills, fatigue, fever and unexpected weight change  HENT: Negative for trouble swallowing  Eyes: Negative for pain, redness and visual disturbance  Respiratory: Negative for cough, shortness of breath and wheezing  Cardiovascular: Negative for chest pain, palpitations and leg swelling  Gastrointestinal: Negative for abdominal pain, constipation, diarrhea, nausea and vomiting  Endocrine: Negative for cold intolerance and heat intolerance  Musculoskeletal: Negative for arthralgias, back pain, gait problem and myalgias  Skin: Negative for color change and rash     Neurological: Negative for dizziness, syncope, light-headedness, numbness and headaches  Hematological: Negative for adenopathy  Psychiatric/Behavioral: Negative for agitation and confusion  All other systems reviewed and are negative  Patient Active Problem List   Diagnosis    Dyspnea on exertion    Dysphonia    Laryngeal stenosis    Cough    Gastroesophageal reflux disease    Reflux laryngitis    Mixed conductive and sensorineural hearing loss of right ear with restricted hearing of left ear    Perforation of right tympanic membrane    Phyllodes tumor of breast     Past Medical History:   Diagnosis Date    Adult-onset obesity     Allergic rhinitis     Disc displacement, cervical     GERD (gastroesophageal reflux disease)     Retinal disorders     last assessed 6/30/14    Tuberculosis, laryngeal     Vitamin D deficiency disease     last assessed 6/30/14     Past Surgical History:   Procedure Laterality Date    BREAST LUMPECTOMY Right 1/14/2020    Procedure: BREAST NEEDLE LOCALIZED LUMPECTOMY (NEEDLE LOC AT 0800);   Surgeon: Ericka Swenson MD;  [facility];  Service: Surgical Oncology    BREAST LUMPECTOMY Right 2/25/2020    Procedure: BREAST LUMPECTOMY CAVITY RE-EXCISION;  Surgeon: Ericka Swenson MD;  [facility];  Service: Surgical Oncology    BREAST SURGERY      EAR SURGERY Left     ear drum repair    MAMMO NEEDLE LOCALIZATION RIGHT (ALL INC) Right 1/14/2020    VT BRONCHOSCOPY,DIAGNOSTIC N/A 10/19/2018    Procedure: BRONCHOSCOPY RIGID;  Surgeon: Elisa Cardozo MD;  [facility];  Service: ENT    VT LARYNGOSCOPY,DIRCT,OP [facility] N/A 10/19/2018    Procedure: EXCISION LARYNGEAL SCAR; SUPRAGLOTTOPLASTY  POSSIBLE JET VENTILATION; CO2 LASER; COBLATOR;  Surgeon: Elisa Cardozo MD;  [facility];  Service: ENT    VT LARYNGOSCOPY,DIRCT,OP,BIOPSY N/A 10/19/2018    Procedure: MICRO DIRECT LARYNGOSCOPY;  Surgeon: Elisa Cardozo MD;  [facility];  Service: ENT    US GUIDED BREAST BIOPSY RIGHT COMPLETE Right 11/25/2019     Family History   Problem Relation Age of Onset    Lung cancer Mother 76    Liver cancer Brother      Social History     Socioeconomic History    Marital status: /Civil Union     Spouse name: Not on file    Number of children: Not on file    Years of education: Not on file    Highest education level: Not on file   Occupational History    Not on file   Social Needs    Financial resource strain: Not on file    Food insecurity     Worry: Not on file     Inability: Not on file   [employer] needs     Medical: Not on file     Non-medical: Not on file   Tobacco Use    Smoking status: Never Smoker    Smokeless tobacco: Never Used   Substance and Sexual Activity    Alcohol use: No    Drug use: No    Sexual activity: Never     Birth control/protection: Post-menopausal   Lifestyle    Physical activity     Days per week: Not on file     Minutes per session: Not on file    Stress: Not on file   Relationships    Social connections     Talks on phone: Not on file     Gets together: Not on file     Attends Confucianism service: Not on file     Active member of club or organization: Not on file     Attends meetings of clubs or organizations: Not on file     Relationship status: Not on file    Intimate partner violence     Fear of current or ex partner: Not on file     Emotionally abused: Not on file     Physically abused: Not on file     Forced sexual activity: Not on file   Other Topics Concern    Not on file   Social History Narrative    Not on file       Current Outpatient Medications:     fexofenadine (Allegra Allergy) 180 MG tablet, Take 180 mg by mouth daily, Disp: , Rfl:     famotidine (PEPCID) 40 MG tablet, TAKE 1 TABLET BY MOUTH EVERY DAY (Patient not taking: Reported on 9/24/2020), Disp: 90 tablet, Rfl: 0    naproxen (NAPROSYN) 500 mg tablet, TAKE 1 TABLET BY MOUTH TWICE A DAY WITH MEALS (Patient not taking: Reported on 9/24/2020), Disp: 60 tablet, Rfl: 0   omeprazole (PriLOSEC) 40 MG capsule, Take 1 capsule (40 mg total) by mouth every morning (Patient not taking: Reported on 9/24/2020), Disp: 30 capsule, Rfl: 1  Allergies   Allergen Reactions    Pollen Extract      Vitals:    09/24/20 1503   BP: 120/82   Pulse: 68   Resp: 16   Temp: 98 5 °F (36 9 °C)       Physical Exam  Vitals signs reviewed  Constitutional:       General: She is not in acute distress  Appearance: Normal appearance  She is well-developed  She is not diaphoretic  HENT:      Head: Normocephalic and atraumatic  Neck:      Musculoskeletal: Normal range of motion  Cardiovascular:      Rate and Rhythm: Normal rate and regular rhythm  Heart sounds: Normal heart sounds  Pulmonary:      Effort: Pulmonary effort is normal       Breath sounds: Normal breath sounds  Chest:      Breasts:         Right: Skin change (surgical scar present) present  No swelling, bleeding, inverted nipple, mass, nipple discharge or tenderness  Left: No swelling, bleeding, inverted nipple, mass, nipple discharge, skin change or tenderness  Abdominal:      Palpations: Abdomen is soft  There is no mass  Tenderness: There is no abdominal tenderness  Musculoskeletal: Normal range of motion  Lymphadenopathy:      Upper Body:      Right upper body: No supraclavicular or axillary adenopathy  Left upper body: No supraclavicular or axillary adenopathy  Skin:     General: Skin is warm and dry  Findings: No rash  Neurological:      Mental Status: She is alert and oriented to person, place, and time  Psychiatric:         Speech: Speech normal          Advance Care Planning/Advance Directives:  Discussed disease status and treatment goals with the patient

## 2020-09-24 NOTE — PATIENT INSTRUCTIONS
Breast Self Exam for Women   AMBULATORY CARE:   A breast self-exam (BSE)  is a way to check your breasts for lumps and other changes  Regular BSEs can help you know how your breasts normally look and feel  Most breast lumps or changes are not cancer, but you should always have them checked by a healthcare provider  Why you should do a BSE:  Breast cancer is the most common type of cancer in women  Even if you have mammograms, you may still want to do a BSE regularly  If you know how your breasts normally feel and look, it may help you know when to contact your healthcare provider  Mammograms can miss some cancers  You may find a lump during a BSE that did not show up on your mammogram   When you should do a BSE:  Jose Paytonon your calendar to help you remember to do BSE on a regular schedule  One easy way to remember to do a BSE is to do the exam on the same day of each month  If you have periods, you may want to do your BSE 1 week after your period ends  This is the time when your breasts may be the least swollen, lumpy, or tender  You can do regular BSEs even if you are breastfeeding or have breast implants  Contact your healthcare provider if:   · You find any lumps or changes in your breasts  · You have breast pain or fluid coming from your nipples  · You have questions or concerns about your condition or care  How to do a BSE:   · Look at your breasts in a mirror  Look at the size and shape of each breast and nipple  Check for swelling, lumps, dimpling, scaly skin, or other skin changes  Look for nipple changes, such as a nipple that is painful or beginning to pull inward  Gently squeeze both nipples and check to see if fluid (that is not breast milk) comes out of them  If you find any of these or other breast changes, contact your healthcare provider  Check your breasts while you sit or  the following 3 positions:    [facility] your arms down at your sides      ¨ Raise your hands and join them behind your head  ¨ Put firm pressure with your hands on your hips  Bend slightly forward while you look at your breasts in the mirror  · Lie down and feel your breasts  When you lie down, your breast tissue spreads out evenly over your chest  This makes it easier for you to feel for lumps and anything that may not be normal for your breasts  Do a BSE on one breast at a time  ¨ Place a small pillow or towel under your left shoulder  Put your left arm behind your head  ¨ Use the 3 middle fingers of your right hand  Use your fingertip pads, on the top of your fingers  Your fingertip pad is the most sensitive part of your finger  ¨ Use small circles to feel your breast tissue  Use your fingertip pads to make dime-sized, overlapping circles on your breast and armpits  Use light, medium, and firm pressure  First, press lightly  Second, press with medium pressure to feel a little deeper into the breast  Last, use firm pressure to feel deep within your breast     ¨ Examine your entire breast area  Examine the breast area from above the breast to below the breast where you feel only ribs  Make small circles with your fingertips, starting in the middle of your armpit  Make circles going up and down the breast area  Continue toward your breast and all the way across it  Examine the area from your armpit all the way over to the middle of your chest (breastbone)  Stop at the middle of your chest     ¨ Move the pillow or towel to your right shoulder, and put your right arm behind your head  Use the 3 fingertip pads of your left hand, and repeat the above steps to do a BSE on your right breast        What else you can do to check for breast problems or cancer:  Some experts suggest that women 36years of age or older should have a mammogram every year  Other experts suggest that women between the ages of 48and 76years old should have a mammogram every 2 years   Talk to your healthcare provider about when you should have a mammogram   Follow up with your healthcare provider as directed: Your healthcare provider can watch you and tell you if you are doing your BSE correctly  Write down your questions so you remember to ask them during your visits  © 2017 [address] Information is for End User's use only and may not be sold, redistributed or otherwise used for commercial purposes  All illustrations and images included in CareNotes® are the copyrighted property of [employer]  or Ye [employer]  The above information is an  only  It is not intended as medical advice for individual conditions or treatments  Talk to your doctor, nurse or pharmacist before following any medical regimen to see if it is safe and effective for you

## 2020-10-28 ENCOUNTER — HOSPITAL ENCOUNTER (OUTPATIENT)
Dept: RADIOLOGY | Facility: HOSPITAL | Age: 55
Discharge: HOME | End: 2020-10-28
Payer: COMMERCIAL

## 2020-10-28 VITALS — WEIGHT: 180 LBS | HEIGHT: 59 IN | BODY MASS INDEX: 36.29 KG/M2

## 2020-10-28 DIAGNOSIS — D48.60 PHYLLODES TUMOR OF BREAST: ICD-10-CM

## 2020-10-28 PROCEDURE — G0279 TOMOSYNTHESIS, MAMMO: HCPCS

## 2020-10-28 PROCEDURE — 77066 DX MAMMO INCL CAD BI: CPT

## 2020-12-24 RX ORDER — FAMOTIDINE 40 MG/1
TABLET, FILM COATED ORAL
Qty: 90 TABLET | Refills: 0 | OUTPATIENT
Start: 2020-12-24

## 2021-03-29 ENCOUNTER — TELEPHONE (OUTPATIENT)
Dept: SURGICAL ONCOLOGY | Facility: CLINIC | Age: 56
End: 2021-03-29

## 2021-03-30 ENCOUNTER — OFFICE VISIT (OUTPATIENT)
Dept: SURGICAL ONCOLOGY | Facility: CLINIC | Age: 56
End: 2021-03-30
Payer: COMMERCIAL

## 2021-03-30 VITALS
SYSTOLIC BLOOD PRESSURE: 124 MMHG | HEIGHT: 59 IN | TEMPERATURE: 96.5 F | WEIGHT: 196 LBS | RESPIRATION RATE: 16 BRPM | HEART RATE: 65 BPM | BODY MASS INDEX: 39.51 KG/M2 | DIASTOLIC BLOOD PRESSURE: 80 MMHG

## 2021-03-30 DIAGNOSIS — D48.60 PHYLLODES TUMOR OF BREAST: Primary | ICD-10-CM

## 2021-03-30 PROCEDURE — 99213 OFFICE O/P EST LOW 20 MIN: CPT | Performed by: NURSE PRACTITIONER

## 2021-03-30 NOTE — PROGRESS NOTES
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  She underwent a right lumpectomy with re-excision of margins with Dr Melia Ugalde in February 2020  She had a bilateral 3D diagnostic mammogram on October 28, 2020 which was BI-RADS 2, category 1 density  Her only complaint today is some left breast tenderness  On her exam today, she reports some tenderness with palpation of the anterior chest wall  Patient states that she injured her shoulder and is currently performing several shoulder exercises  I suspect her pain is muscular in origin and I recommended NSAIDs and heat application but instructed the patient to call if she notices any further changes or worsening symptoms  I will order a bilateral mammogram for October and plan to see her back at that time or sooner should the need arise  She was instructed to call with any new concerns or symptoms prior to that time  All of her questions were answered today  History of Present Illness:     -Interval History:  Patient notices no new breast lumps  She does report some left breast tenderness with palpation only  She offers no other complaints today  She had a bilateral mammogram in October which was BI-RADS 2, category 1 density  Review of Systems:  Review of Systems   Constitutional: Negative for chills, fatigue and fever  Respiratory: Negative for cough and shortness of breath  Cardiovascular: Negative for chest pain  Skin: Negative for color change     Hematological: Negative for adenopathy  Patient Active Problem List   Diagnosis    Dyspnea on exertion    Dysphonia    Laryngeal stenosis    Cough    Gastroesophageal reflux disease    Reflux laryngitis    Mixed conductive and sensorineural hearing loss of right ear with restricted hearing of left ear    Perforation of right tympanic membrane    Phyllodes tumor of breast     Past Medical History:   Diagnosis Date    Adult-onset obesity     Allergic rhinitis     Breast cancer (HCC)     right     Disc displacement, cervical     GERD (gastroesophageal reflux disease)     Retinal disorders     last assessed 6/30/14    Tuberculosis, laryngeal     Vitamin D deficiency disease     last assessed 6/30/14     Past Surgical History:   Procedure Laterality Date    BREAST CYST EXCISION Right     BREAST LUMPECTOMY Right 1/14/2020    Procedure: BREAST NEEDLE LOCALIZED LUMPECTOMY (NEEDLE LOC AT 0800);   Surgeon: Melinda Hair MD;  [facility];  Service: Surgical Oncology    BREAST LUMPECTOMY Right 2/25/2020    Procedure: BREAST LUMPECTOMY CAVITY RE-EXCISION;  Surgeon: Melinda Hair MD;  [facility];  Service: Surgical Oncology    BREAST SURGERY      EAR SURGERY Left     ear drum repair    MAMMO NEEDLE LOCALIZATION RIGHT (ALL INC) Right 1/14/2020    CO BRONCHOSCOPY,DIAGNOSTIC N/A 10/19/2018    Procedure: BRONCHOSCOPY RIGID;  Surgeon: Leonela Urbano MD;  [facility];  Service: ENT    CO LARYNGOSCOPY,DIRCT,OP SCOP,EXC TUMR N/A 10/19/2018    Procedure: EXCISION LARYNGEAL SCAR; SUPRAGLOTTOPLASTY  POSSIBLE JET VENTILATION; CO2 LASER; COBLATOR;  Surgeon: Leonela Urbano MD;  [facility];  Service: ENT    CO LARYNGOSCOPY,DIRCT,OP,BIOPSY N/A 10/19/2018    Procedure: MICRO DIRECT LARYNGOSCOPY;  Surgeon: Leonela Urbano MD;  [facility];  Service: ENT    US GUIDED BREAST BIOPSY RIGHT COMPLETE Right 11/25/2019     Family History   Problem Relation Age of Onset    Lung cancer Mother 67    Liver cancer Brother      Social History     Socioeconomic History    Marital status: /Civil Union     Spouse name: Not on file    Number of children: Not on file    Years of education: Not on file    Highest education level: Not on file   Occupational History    Not on file   Social Needs    Financial resource strain: Not on file    Food insecurity     Worry: Not on file     Inability: Not on file   [employer] needs     Medical: Not on file     Non-medical: Not on file   Tobacco Use    Smoking status: Never Smoker    Smokeless tobacco: Never Used   Substance and Sexual Activity    Alcohol use: No    Drug use: No    Sexual activity: Never     Birth control/protection: Post-menopausal   Lifestyle    Physical activity     Days per week: Not on file     Minutes per session: Not on file    Stress: Not on file   Relationships    Social connections     Talks on phone: Not on file     Gets together: Not on file     Attends Congregational service: Not on file     Active member of club or organization: Not on file     Attends meetings of clubs or organizations: Not on file     Relationship status: Not on file    Intimate partner violence     Fear of current or ex partner: Not on file     Emotionally abused: Not on file     Physically abused: Not on file     Forced sexual activity: Not on file   Other Topics Concern    Not on file   Social History Narrative    Not on file       Current Outpatient Medications:     fexofenadine (Allegra Allergy) 180 MG tablet, Take 180 mg by mouth daily, Disp: , Rfl:   Allergies   Allergen Reactions    Pollen Extract      Vitals:    03/30/21 1449   BP: 124/80   Pulse: 65   Resp: 16   Temp: (!) 96 5 °F (35 8 °C)       Physical Exam  Constitutional:       Appearance: Normal appearance  HENT:      Head: Normocephalic and atraumatic  Pulmonary:      Effort: Pulmonary effort is normal    Chest:      Chest wall: Tenderness (left pec) present        Breasts: Right: Skin change (surgical scars) present  No swelling, bleeding, inverted nipple, mass, nipple discharge or tenderness  Left: No swelling, bleeding, inverted nipple, mass, nipple discharge, skin change or tenderness  Lymphadenopathy:      Upper Body:      Right upper body: No supraclavicular or axillary adenopathy  Left upper body: No supraclavicular or axillary adenopathy  Skin:     General: Skin is warm and dry  Neurological:      General: No focal deficit present  Mental Status: She is alert and oriented to person, place, and time  Psychiatric:         Mood and Affect: Mood normal          Advance Care Planning/Advance Directives:  Discussed disease status and treatment goals with the patient

## 2021-04-19 DIAGNOSIS — Z23 ENCOUNTER FOR IMMUNIZATION: ICD-10-CM

## 2021-04-29 ENCOUNTER — IMMUNIZATIONS (OUTPATIENT)
Dept: FAMILY MEDICINE CLINIC | Facility: HOSPITAL | Age: 56
End: 2021-04-29

## 2021-04-29 DIAGNOSIS — Z23 ENCOUNTER FOR IMMUNIZATION: Primary | ICD-10-CM

## 2021-04-29 PROCEDURE — 0011A SARS-COV-2 / COVID-19 MRNA VACCINE (MODERNA) 100 MCG: CPT

## 2021-04-29 PROCEDURE — 91301 SARS-COV-2 / COVID-19 MRNA VACCINE (MODERNA) 100 MCG: CPT

## 2021-06-01 ENCOUNTER — IMMUNIZATIONS (OUTPATIENT)
Dept: FAMILY MEDICINE CLINIC | Facility: HOSPITAL | Age: 56
End: 2021-06-01

## 2021-06-01 DIAGNOSIS — Z23 ENCOUNTER FOR IMMUNIZATION: Primary | ICD-10-CM

## 2021-06-01 PROCEDURE — 0012A SARS-COV-2 / COVID-19 MRNA VACCINE (MODERNA) 100 MCG: CPT

## 2021-06-01 PROCEDURE — 91301 SARS-COV-2 / COVID-19 MRNA VACCINE (MODERNA) 100 MCG: CPT

## 2021-07-19 ENCOUNTER — OFFICE VISIT (OUTPATIENT)
Dept: FAMILY MEDICINE CLINIC | Facility: CLINIC | Age: 56
End: 2021-07-19
Payer: COMMERCIAL

## 2021-07-19 VITALS
SYSTOLIC BLOOD PRESSURE: 126 MMHG | DIASTOLIC BLOOD PRESSURE: 78 MMHG | RESPIRATION RATE: 18 BRPM | HEART RATE: 69 BPM | HEIGHT: 59 IN | BODY MASS INDEX: 40.84 KG/M2 | OXYGEN SATURATION: 97 % | WEIGHT: 202.6 LBS | TEMPERATURE: 99.1 F

## 2021-07-19 DIAGNOSIS — R53.83 FATIGUE, UNSPECIFIED TYPE: ICD-10-CM

## 2021-07-19 DIAGNOSIS — Z13.220 SCREENING FOR HYPERLIPIDEMIA: ICD-10-CM

## 2021-07-19 DIAGNOSIS — Z13.1 SCREENING FOR DIABETES MELLITUS (DM): ICD-10-CM

## 2021-07-19 DIAGNOSIS — Z12.11 SCREENING FOR COLON CANCER: ICD-10-CM

## 2021-07-19 DIAGNOSIS — Z00.00 ANNUAL PHYSICAL EXAM: Primary | ICD-10-CM

## 2021-07-19 DIAGNOSIS — E66.01 CLASS 3 SEVERE OBESITY WITHOUT SERIOUS COMORBIDITY WITH BODY MASS INDEX (BMI) OF 40.0 TO 44.9 IN ADULT, UNSPECIFIED OBESITY TYPE (HCC): ICD-10-CM

## 2021-07-19 PROCEDURE — 3725F SCREEN DEPRESSION PERFORMED: CPT | Performed by: FAMILY MEDICINE

## 2021-07-19 PROCEDURE — 99396 PREV VISIT EST AGE 40-64: CPT | Performed by: FAMILY MEDICINE

## 2021-07-19 PROCEDURE — 1036F TOBACCO NON-USER: CPT | Performed by: FAMILY MEDICINE

## 2021-07-19 PROCEDURE — 3008F BODY MASS INDEX DOCD: CPT | Performed by: FAMILY MEDICINE

## 2021-07-19 NOTE — PATIENT INSTRUCTIONS
Lower Back Exercises   AMBULATORY CARE:   Lower back exercises  help heal and strengthen your back muscles to prevent another injury  Ask your healthcare provider if you need to see a physical therapist for more advanced exercises  Seek care immediately if:   · You have severe pain that prevents you from moving  Contact your healthcare provider if:   · Your pain becomes worse  · You have new pain  · You have questions or concerns about your condition or care  Do lower back exercises safely:   · Do the exercises on a mat or firm surface  (not on a bed) to support your spine and prevent low back pain  · Move slowly and smoothly  Avoid fast or jerky motions  · Breathe normally  Do not hold your breath  · Stop if you feel pain  It is normal to feel some discomfort at first  Regular exercise will help decrease your discomfort over time  Lower back exercises: Your healthcare provider may recommend that you do back exercises 10 to 30 minutes each day  He may also recommend that you do exercises 1 to 3 times each day  Ask your healthcare provider which exercises are best for you and how often to do them  · Ankle pumps:  Lie on your back  Move your foot up (with your toes pointing toward your head)  Then, move your foot down (with your toes pointing away from you)  Repeat this exercise 10 times on each side  · Heel slides:  Lie on your back  Slowly bend one leg and then straighten it  Next, bend the other leg and then straighten it  Repeat 10 times on each side  · Pelvic tilt:  Lie on your back with your knees bent and feet flat on the floor  Place your arms in a relaxed position beside your body  Tighten the muscles of your abdomen and flatten your back against the floor  Hold for 5 seconds  Repeat 5 times  · Back stretch:  Lie on your back with your hands behind your head  Bend your knees and turn the lower half of your body to one side   Hold this position for 10 seconds  Repeat 3 times on each side  · Straight leg raises:  Lie on your back with one leg straight  Bend the other knee  Tighten your abdomen and then slowly lift the straight leg up about 6 to 12 inches off the floor  Hold for 1 to 5 seconds  Lower your leg slowly  Repeat 10 times on each leg  · Knee-to-chest:  Lie on your back with your knees bent and feet flat on the floor  Pull one of your knees toward your chest and hold it there for 5 seconds  Return your leg to the starting position  Lift the other knee toward your chest and hold for 5 seconds  Do this 5 times on each side  · Cat and camel:  Place your hands and knees on the floor  Arch your back upward toward the ceiling and lower your head  Round out your spine as much as you can  Hold for 5 seconds  Lift your head upward and push your chest downward toward the floor  Hold for 5 seconds  Do 3 sets or as directed  · Wall squats:  Stand with your back against a wall  Tighten the muscles of your abdomen  Slowly lower your body until your knees are bent at a 45 degree angle  Hold this position for 5 seconds  Slowly move back up to a standing position  Repeat 10 times  · Curl up:  Lie on your back with your knees bent and feet flat on the floor  Place your hands, palms down, underneath the curve in your lower back  Next, with your elbows on the floor, lift your shoulders and chest 2 to 3 inches  Keep your head in line with your shoulders  Hold this position for 5 seconds  When you can do this exercise without pain for 10 to 15 seconds, you may add a rotation  While your shoulders and chest are lifted off the ground, turn slightly to the left and hold  Repeat on the other side  · Bird dog:  Place your hands and knees on the floor  Keep your wrists directly below your shoulders and your knees directly below your hips  Pull your belly button in toward your spine  Do not flatten or arch your back   Tighten your abdominal muscles  Raise one arm straight out so that it is aligned with your head  Next, raise the leg opposite your arm  Hold this position for 15 seconds  Lower your arm and leg slowly and change sides  Do 5 sets  © Copyright [address] Information is for End User's use only and may not be sold, redistributed or otherwise used for commercial purposes  All illustrations and images included in CareNotes® are the copyrighted property of A D A M , Inc  or [employer]  The above information is an  only  It is not intended as medical advice for individual conditions or treatments  Talk to your doctor, nurse or pharmacist before following any medical regimen to see if it is safe and effective for you  Ankle Exercises   AMBULATORY CARE:   What you need to know about ankle exercises: Ankle exercises help strengthen your ankle and improve its function after injury  These are beginning exercises  Ask your healthcare provider if you need to see a physical therapist for more advanced exercises  · Do these exercises 3 to 5 days a week , or as directed by your healthcare provider  Ask if you should perform the exercises on each ankle  · Do the exercises in the order that your healthcare provider recommends  This will help prevent swelling, chronic pain, and reinjury  Start with range of motion exercises  Then progress to strengthening exercises, and finally to balancing exercises  · Warm up before you do ankle exercises  Walk or ride a stationary bike for 5 to 10 minutes to prepare your ankle for movement  · Stop if you feel pain  It is normal to feel some discomfort at first  Regular exercise will help decrease your discomfort over time  How to perform range of motion exercises safely:  Begin with range of motion exercises to improve flexibility  Ask your healthcare provider when you can progress to strengthening exercises    · Ankle alphabet:  Sit on a chair so that your feet do not touch the floor  Use your big toe to write each letter of the alphabet  Use only your foot and ankle, and keep your movements small  Do 2 sets  · Calf stretches:      ? Sitting calf stretches with a towel:  Sit on the floor with both legs out straight in front of you  Loop a towel around the ball of your injured foot  Grasp the ends of the towel and pull it toward you  Keep your leg and back straight  Do not lean forward as you pull the towel  Hold for 30 seconds  Then relax for 30 seconds  Do 2 sets of 10          ? Standing calf stretches:  Stand facing a wall with the foot that is not injured forward and your knee slightly bent  Keep the leg with the injured foot straight and behind you with your toes pointed in slightly  With both heels flat on the floor, press your hips forward  Do not arch your back  Hold for 30 seconds, and then relax for 30 seconds  Do 2 sets of 10  Repeat with your leg bent  Do 2 sets of 10  How to perform strengthening exercises safely:  After you can perform range of motion exercises without pain, you may begin strengthening exercises  Ask your healthcare provider when you can progress to balancing exercises  · Ankle movement in 4 directions:  Sit on the floor with your legs straight in front of you  Keep your heels on the floor for support  ? Dorsiflexion:  Begin with your toes pointing straight up  Pull your toes toward your body  Slowly return to the starting position  Do 3 sets of 5      ? Plantar flexion:  Begin with your toes pointing straight up  Push your toes away from your body  Slowly return to the starting position  Do 3 sets of 5          ? Inversion:  Begin with your toes pointing straight up  Push your toes inward, toward each other  Slowly return to the starting position  Do 3 sets of 5      ? Eversion:  Begin with your toes pointing straight up  Push your toes outward, away from each other  Slowly return to the starting position   Do 3 sets of 5  · Toe curls with a towel:  Sit on a chair so that both of your feet are flat on the floor  Place a small towel on the floor in front of your injured foot  Grab the center of the towel with your toes and curl the towel toward you  Relax and repeat  Do 1 set of 5          · [place] pick-ups:  Sit on a chair so that both of your feet are flat on the floor  Place 20 marbles on the floor in front of your injured foot  Use your toes to  one marble at a time and place it into a bowl  Repeat until you have picked up all the marbles  Do 1 set  · Heel raises:      ? Single leg heel raises:  Stand with your weight evenly on both feet  Hold on to a chair or a wall for balance  Lift the foot that is not injured off the floor so all your weight is placed on your injured foot  Raise the heel of your injured foot as high as you can  Slowly lower your heel to the floor  Do 1 set of 10          ? Double leg heel raises:  Stand with your weight evenly on both feet  Hold on to a chair or a wall for balance  Raise both of your heels as high as you can  Slowly lower your heels to the floor  Do 1 set of 10  · Heel and toe walks:      ? Heel walks:  Begin in a standing position  Lift your toes off the floor and walk on your heels  Keep your toes lifted as high as possible  Do 2 sets of 10          ? Toe walks:  Begin in a standing position  Lift your heels off the floor and walk on the balls and toes of your feet  Keep your heels lifted as high as possible  Do 2 sets of 10  How to perform a balance exercise safely:  After you can perform strengthening exercises without pain, you may do this beginning balancing exercise  Ask your healthcare provider for more advanced balance exercises  · Single leg stance:  Stand with your weight evenly on both feet, or hold on to a chair or a wall  Do not lean to the side  Lift the foot that is not injured off the floor so all your weight is placed on your injured foot  Balance on your injured foot  Ask your healthcare provider how long to hold this position  Contact your healthcare provider if:   · Your pain becomes worse  · You have new pain  · You have questions or concerns about your condition, care, or exercise program     © Copyright [address] Information is for End User's use only and may not be sold, redistributed or otherwise used for commercial purposes  All illustrations and images included in CareNotes® are the copyrighted property of A D A M , Inc  or [facility] Nehal Hernandez   The above information is an  only  It is not intended as medical advice for individual conditions or treatments  Talk to your doctor, nurse or pharmacist before following any medical regimen to see if it is safe and effective for you

## 2021-07-19 NOTE — PROGRESS NOTES
[address] [facility]    NAME: Ankita Mccarthy  AGE: 64 y o  SEX: female  : 1965     DATE: 2021     Assessment and Plan:     Problem List Items Addressed This Visit     None      Visit Diagnoses     Annual physical exam    -  Primary    Screening for colon cancer        Relevant Orders    Ambulatory referral for colonoscopy    Screening for diabetes mellitus (DM)        Relevant Orders    HEMOGLOBIN A1C W/ EAG ESTIMATION    Screening for hyperlipidemia        Fatigue, unspecified type        Relevant Orders    CBC and differential    Class 3 severe obesity without serious comorbidity with body mass index (BMI) of 40 0 to 44 9 in adult, unspecified obesity type ([address] )        Relevant Orders    Lipid Panel with Direct LDL reflex          Immunizations and preventive care screenings were discussed with patient today  Appropriate education was printed on patient's after visit summary  Counseling:  Dental Health: discussed importance of regular tooth brushing, flossing, and dental visits  · Exercise: the importance of regular exercise/physical activity was discussed  Recommend exercise 3-5 times per week for at least 30 minutes  Mrs Leigh Bonds presented for an annual physical exam today  She mentioned having fatigue lately  In addition, she mentioned extreme insomnia with an average of  2-4 hours of sleep per night  Today we discussed proper sleep hygiene  In addition we will obtain a CBC to rule out anemia  In addition, patient and her  both mentioned that she has a poor diet  With 1-2 meals a day and no snacking  Discussed proper nutrition today  Patient also complained of low back pain which is chronic now  Discussed regular exercise regimen of walking at tolerated pace for at least 20 minutes daily  Also provided patient with some low back exercises    In addition, patient complained of occasional ankle   And foot pain  Advised patient to monitor symptoms and provided foot exercises  in addition, patient complained of lump on her left elbow  Advised patient to continue to monitor size and symptoms and call if it worsens  Last A1c  3 years ago was 5 5  Will repeat A1c due to recent complaint of polyuria  No follow-ups on file  Chief Complaint:     Chief Complaint   Patient presents with    Annual Exam      History of Present Illness:     Adult Annual Physical   Patient here for a comprehensive physical exam  The patient reports  Multiple problems - Polyuria, low back pain, ankle pain, insomnia and fatigue  Diet and Physical Activity  · Diet/Nutrition: poor diet- maybe 2 meals daily with no snacks  · Exercise: no formal exercise  Depression Screening  PHQ-9 Depression Screening    PHQ-9:   Frequency of the following problems over the past two weeks:      Little interest or pleasure in doing things: 0 - not at all  Feeling down, depressed, or hopeless: 0 - not at all  PHQ-2 Score: 0       General Health  · Sleep:  Has trouble  Staying asleep  She is for 2-4 hours every night  · Hearing: normal - bilateral   · Vision: uses reading glasses   Will schedule  eye exams  · Dental: regular dental visits  /GYN Health  · Patient is: postmenopausal  · Last menstrual period: more than 5 years ago      Review of Systems:     Review of Systems   Constitutional: Positive for fatigue  Negative for chills and fever  HENT: Positive for congestion and postnasal drip  Negative for ear pain and sore throat  Postnasal drip and congestion   From environmental allergies   Eyes: Negative for pain and visual disturbance  Respiratory: Positive for shortness of breath  Negative for cough  Cardiovascular: Negative for chest pain and palpitations  Gastrointestinal: Negative for abdominal pain, constipation, nausea and vomiting  Endocrine: Positive for polyuria     Genitourinary: Negative for dysuria and hematuria  Musculoskeletal: Negative for arthralgias and back pain  Skin: Negative for color change and rash  Neurological: Negative for seizures, syncope, light-headedness and headaches  Psychiatric/Behavioral: Positive for sleep disturbance  All other systems reviewed and are negative  Past Medical History:     Past Medical History:   Diagnosis Date    Adult-onset obesity     Allergic rhinitis     Breast cancer ([address] )     right     Disc displacement, cervical     GERD (gastroesophageal reflux disease)     Retinal disorders     last assessed 6/30/14    Tuberculosis, laryngeal     Vitamin D deficiency disease     last assessed 6/30/14      Past Surgical History:     Past Surgical History:   Procedure Laterality Date    BREAST CYST EXCISION Right     BREAST LUMPECTOMY Right 1/14/2020    Procedure: BREAST NEEDLE LOCALIZED LUMPECTOMY (NEEDLE LOC AT 0800);   Surgeon: Vonnie Ambrose MD;  [facility];  Service: Surgical Oncology    BREAST LUMPECTOMY Right 2/25/2020    Procedure: BREAST LUMPECTOMY CAVITY RE-EXCISION;  Surgeon: Vonnie Ambrose MD;  [facility];  Service: Surgical Oncology    BREAST SURGERY      EAR SURGERY Left     ear drum repair    MAMMO NEEDLE LOCALIZATION RIGHT (ALL INC) Right 1/14/2020    MI BRONCHOSCOPY,DIAGNOSTIC N/A 10/19/2018    Procedure: BRONCHOSCOPY RIGID;  Surgeon: Precious Adrian MD;  [facility];  Service: ENT    MI LARYNGOSCOPY,DIRCT,OP [address] N/A 10/19/2018    Procedure: EXCISION LARYNGEAL SCAR; SUPRAGLOTTOPLASTY  POSSIBLE JET VENTILATION; CO2 LASER; COBLATOR;  Surgeon: Precious Adrian MD;  [facility];  Service: ENT    MI LARYNGOSCOPY,DIRCT,OP,BIOPSY N/A 10/19/2018    Procedure: MICRO DIRECT LARYNGOSCOPY;  Surgeon: Precious Adrian MD;  [facility];  Service: ENT    US GUIDED BREAST BIOPSY RIGHT COMPLETE Right 11/25/2019      Social History:     Social History     Socioeconomic History    Marital status: /Civil [employer]     Spouse name: None    Number of children: None    Years of education: None    Highest education level: None   Occupational History    None   Tobacco Use    Smoking status: Never Smoker    Smokeless tobacco: Never Used   Substance and Sexual Activity    Alcohol use: No    Drug use: No    Sexual activity: Never     Birth control/protection: Post-menopausal   Other Topics Concern    None   Social History Narrative    None     Social Determinants of Health     Financial Resource Strain:     Difficulty of Paying Living Expenses:    Food Insecurity:     Worried About Running Out of Food in the Last Year:     Ran Out of Food in the Last Year:    Transportation Needs:     Lack of Transportation (Medical):  Lack of Transportation (Non-Medical):    Physical Activity:     Days of Exercise per Week:     Minutes of Exercise per Session:    Stress:     Feeling of Stress :    Social Connections:     Frequency of Communication with Friends and Family:     Frequency of Social Gatherings with Friends and Family:     Attends [employer] Services:     Active Member of Clubs or Organizations:     Attends Club or Organization Meetings:     Marital Status:    Intimate Partner Violence:     Fear of Current or Ex-Partner:     Emotionally Abused:     Physically Abused:     Sexually Abused:       Family History:     Family History   Problem Relation Age of Onset    Lung cancer Mother 76    Liver cancer Brother       Current Medications:     Current Outpatient Medications   Medication Sig Dispense Refill    fexofenadine (Allegra Allergy) 180 MG tablet Take 180 mg by mouth daily       No current facility-administered medications for this visit  Allergies:      Allergies   Allergen Reactions    Pollen Extract Allergic Rhinitis      Physical Exam:     /78 (BP Location: Left arm, Patient Position: Sitting, Cuff Size: Large)   Pulse 69   Temp 99 1 °F (37 3 °C) (Tympanic)   Resp 18    4' 11" (1 499 m)   Wt 91 9 kg (202 lb 9 6 oz)   SpO2 97%   BMI 40 92 kg/m²     Physical Exam  Vitals and nursing note reviewed  Constitutional:       Appearance: She is obese  HENT:      Head: Normocephalic and atraumatic  Right Ear: Tympanic membrane, ear canal and external ear normal       Left Ear: Tympanic membrane, ear canal and external ear normal       Nose: Congestion present  Mouth/Throat:      Mouth: Mucous membranes are moist       Pharynx: Oropharynx is clear  No oropharyngeal exudate or posterior oropharyngeal erythema  Eyes:      Extraocular Movements: Extraocular movements intact  Pupils: Pupils are equal, round, and reactive to light  Cardiovascular:      Rate and Rhythm: Normal rate and regular rhythm  Pulses: Normal pulses  Heart sounds: Normal heart sounds  Pulmonary:      Effort: Pulmonary effort is normal  No respiratory distress  Breath sounds: Normal breath sounds  No wheezing or rales  Abdominal:      General: Bowel sounds are normal       Palpations: Abdomen is soft  Tenderness: There is no abdominal tenderness  Musculoskeletal:         General: No tenderness  Right lower leg: No edema  Left lower leg: No edema  Skin:     General: Skin is warm and dry  Capillary Refill: Capillary refill takes less than 2 seconds  Neurological:      General: No focal deficit present  Mental Status: She is alert and oriented to person, place, and time  Psychiatric:         Mood and Affect: Mood normal          Behavior: Behavior normal          Thought Content:  Thought content normal          Judgment: Judgment normal           Susy Hilario DO  [address]

## 2021-08-06 LAB
BASOPHILS # BLD AUTO: 0.1 X10E3/UL (ref 0–0.2)
BASOPHILS NFR BLD AUTO: 1 %
CHOLEST SERPL-MCNC: 215 MG/DL (ref 100–199)
EOSINOPHIL # BLD AUTO: 0.3 X10E3/UL (ref 0–0.4)
EOSINOPHIL NFR BLD AUTO: 4 %
ERYTHROCYTE [DISTWIDTH] IN BLOOD BY AUTOMATED COUNT: 12.7 % (ref 11.7–15.4)
EST. AVERAGE GLUCOSE BLD GHB EST-MCNC: 117 MG/DL
HBA1C MFR BLD: 5.7 % (ref 4.8–5.6)
HCT VFR BLD AUTO: 39.9 % (ref 34–46.6)
HDLC SERPL-MCNC: 51 MG/DL
HGB BLD-MCNC: 13.5 G/DL (ref 11.1–15.9)
IMM GRANULOCYTES # BLD: 0 X10E3/UL (ref 0–0.1)
IMM GRANULOCYTES NFR BLD: 0 %
LDLC SERPL CALC-MCNC: 140 MG/DL (ref 0–99)
LYMPHOCYTES # BLD AUTO: 2.5 X10E3/UL (ref 0.7–3.1)
LYMPHOCYTES NFR BLD AUTO: 36 %
MCH RBC QN AUTO: 30.2 PG (ref 26.6–33)
MCHC RBC AUTO-ENTMCNC: 33.8 G/DL (ref 31.5–35.7)
MCV RBC AUTO: 89 FL (ref 79–97)
MONOCYTES # BLD AUTO: 0.6 X10E3/UL (ref 0.1–0.9)
MONOCYTES NFR BLD AUTO: 8 %
NEUTROPHILS # BLD AUTO: 3.6 X10E3/UL (ref 1.4–7)
NEUTROPHILS NFR BLD AUTO: 51 %
PLATELET # BLD AUTO: 290 X10E3/UL (ref 150–450)
RBC # BLD AUTO: 4.47 X10E6/UL (ref 3.77–5.28)
TRIGL SERPL-MCNC: 134 MG/DL (ref 0–149)
WBC # BLD AUTO: 7 X10E3/UL (ref 3.4–10.8)

## 2021-11-18 ENCOUNTER — HOSPITAL ENCOUNTER (OUTPATIENT)
Dept: RADIOLOGY | Facility: HOSPITAL | Age: 56
Discharge: HOME | End: 2021-11-18
Payer: COMMERCIAL

## 2021-11-18 VITALS — BODY MASS INDEX: 39.31 KG/M2 | HEIGHT: 59 IN | WEIGHT: 195 LBS

## 2021-11-18 DIAGNOSIS — D48.60 PHYLLODES TUMOR OF BREAST: ICD-10-CM

## 2021-11-18 PROCEDURE — 77066 DX MAMMO INCL CAD BI: CPT

## 2021-11-18 PROCEDURE — G0279 TOMOSYNTHESIS, MAMMO: HCPCS

## 2021-11-30 ENCOUNTER — OFFICE VISIT (OUTPATIENT)
Dept: SURGICAL ONCOLOGY | Facility: CLINIC | Age: 56
End: 2021-11-30
Payer: COMMERCIAL

## 2021-11-30 VITALS
SYSTOLIC BLOOD PRESSURE: 132 MMHG | HEIGHT: 59 IN | WEIGHT: 201 LBS | HEART RATE: 95 BPM | RESPIRATION RATE: 18 BRPM | OXYGEN SATURATION: 96 % | BODY MASS INDEX: 40.52 KG/M2 | DIASTOLIC BLOOD PRESSURE: 86 MMHG | TEMPERATURE: 97.3 F

## 2021-11-30 DIAGNOSIS — Z12.31 VISIT FOR SCREENING MAMMOGRAM: ICD-10-CM

## 2021-11-30 DIAGNOSIS — D48.60 PHYLLODES TUMOR OF BREAST: Primary | ICD-10-CM

## 2021-11-30 PROCEDURE — 99213 OFFICE O/P EST LOW 20 MIN: CPT | Performed by: NURSE PRACTITIONER

## 2022-01-28 ENCOUNTER — IMMUNIZATIONS (OUTPATIENT)
Dept: FAMILY MEDICINE CLINIC | Facility: HOSPITAL | Age: 57
End: 2022-01-28

## 2022-01-28 DIAGNOSIS — Z23 ENCOUNTER FOR IMMUNIZATION: Primary | ICD-10-CM

## 2022-01-28 PROCEDURE — 0064A COVID-19 MODERNA VACC 0.25 ML BOOSTER: CPT

## 2022-01-28 PROCEDURE — 91306 COVID-19 MODERNA VACC 0.25 ML BOOSTER: CPT

## 2022-04-22 ENCOUNTER — PATIENT MESSAGE (OUTPATIENT)
Dept: SURGICAL ONCOLOGY | Facility: CLINIC | Age: 57
End: 2022-04-22

## 2022-04-22 NOTE — TELEPHONE ENCOUNTER
Called patient to further discuss the message that she sent through [address]  Patient reports that her left breast has been very sore for over a week without improvement  Appointment scheduled for her with Lencho Silva on 4/27 at 9:30 for further evaluation of the area  Patient verbalized understanding of appointment details and was appreciative of the call

## 2022-04-27 ENCOUNTER — OFFICE VISIT (OUTPATIENT)
Dept: SURGICAL ONCOLOGY | Facility: CLINIC | Age: 57
End: 2022-04-27
Payer: COMMERCIAL

## 2022-04-27 VITALS
OXYGEN SATURATION: 98 % | HEIGHT: 59 IN | TEMPERATURE: 97.7 F | SYSTOLIC BLOOD PRESSURE: 136 MMHG | BODY MASS INDEX: 35.28 KG/M2 | HEART RATE: 55 BPM | RESPIRATION RATE: 16 BRPM | WEIGHT: 175 LBS | DIASTOLIC BLOOD PRESSURE: 80 MMHG

## 2022-04-27 DIAGNOSIS — R07.89 CHEST WALL TENDERNESS: ICD-10-CM

## 2022-04-27 DIAGNOSIS — N64.4 BREAST PAIN, LEFT: Primary | ICD-10-CM

## 2022-04-27 PROCEDURE — 1036F TOBACCO NON-USER: CPT | Performed by: NURSE PRACTITIONER

## 2022-04-27 PROCEDURE — 3008F BODY MASS INDEX DOCD: CPT | Performed by: NURSE PRACTITIONER

## 2022-04-27 PROCEDURE — 99213 OFFICE O/P EST LOW 20 MIN: CPT | Performed by: NURSE PRACTITIONER

## 2022-04-27 NOTE — PROGRESS NOTES
Impression: Meibomian gland dysfnct right eye, upper and lower eyelids: H02.88A.  Plan: see plan for #2 Surgical Oncology Follow Up       [address]  [facility]  [address] [address]    [place]  [birth year]  [id number]      Chief Complaint   Patient presents with    Follow-up       Assessment/Plan:  1  Breast pain, left  - Suspect muscular but will assess with imaging  - Mammo diagnostic left w 3d & cad; Future  - US breast left limited (diagnostic); Future    2  Chest wall tenderness  - NSAIDS, heat application, wear supportive bra  - Call if symptoms worsen/ do not improve      Discussion/Summary:  Patient is a 57-year-old female with a 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 that presents today for a problem visit  She has been experiencing pain of her left breast for approximately 2 weeks  The pain seems to be improving with the use of Tylenol and heat application  She had a bilateral mammogram in November which was BI-RADS 2  There are no worrisome findings on her exam today  I suspect her pain is muscular in origin and I recommended NSAIDs instead of Tylenol, heat application and a supportive bra  She does report some focal tenderness at the 6 o'clock position which I will assess with imaging  I instructed her to contact me if her pain does not improve  Otherwise I will plan to see her back at her previously scheduled follow-up visit  She and her  are in agreement with these recommendations  All of her questions were answered today  History of Present Illness:     -Interval History:  Patient presents today for a problem visit  She states that she has been experiencing worsening left breast pain x2 weeks  She states the pain is improving  She has used Tylenol and heat application to help with the pain  She notices no breast masses  She does not wear a bra when she is at her house  No new exercises or activities      Review of Systems:  Review of Systems   Constitutional: Negative for chills, fatigue and fever  Respiratory: Negative for cough and shortness of breath  Cardiovascular: Negative for chest pain  Hematological: Negative for adenopathy  Psychiatric/Behavioral: Negative for confusion         Patient Active Problem List   Diagnosis    Dyspnea on exertion    Dysphonia    Laryngeal stenosis    Cough    Gastroesophageal reflux disease    Reflux laryngitis    Mixed conductive and sensorineural hearing loss of right ear with restricted hearing of left ear    Perforation of right tympanic membrane    Phyllodes tumor of breast    Breast pain, left     Past Medical History:   Diagnosis Date    Adult-onset obesity     Allergic rhinitis     Disc displacement, cervical     GERD (gastroesophageal reflux disease)     Retinal disorders     last assessed 6/30/14    Tuberculosis, laryngeal     Vitamin D deficiency disease     last assessed 6/30/14     Past Surgical History:   Procedure Laterality Date    BREAST CYST EXCISION Right     phyllodes tumor    BREAST LUMPECTOMY Right 1/14/2020    phyllodes tumor    BREAST LUMPECTOMY Right 2/25/2020    phyllodes tumor    BREAST SURGERY      EAR SURGERY Left     ear drum repair    MAMMO NEEDLE LOCALIZATION RIGHT (ALL INC) Right 1/14/2020    MI BRONCHOSCOPY,DIAGNOSTIC N/A 10/19/2018    Procedure: BRONCHOSCOPY RIGID;  Surgeon: Radha Caro MD;  [facility];  Service: ENT    MI LARYNGOSCOPY,DIRCT,OP SCOP,EXC TUMR N/A 10/19/2018    Procedure: EXCISION LARYNGEAL SCAR; SUPRAGLOTTOPLASTY  POSSIBLE JET VENTILATION; CO2 LASER; COBLATOR;  Surgeon: Radha Caro MD;  [facility];  Service: ENT    MI LARYNGOSCOPY,DIRCT,OP,BIOPSY N/A 10/19/2018    Procedure: MICRO DIRECT LARYNGOSCOPY;  Surgeon: Radha Caro MD;  [facility];  Service: ENT    US GUIDED BREAST BIOPSY RIGHT COMPLETE Right 11/25/2019     Family History   Problem Relation Age of Onset    Lung cancer Mother 76    Liver cancer Brother     No Known Problems Father  No Known Problems Sister     No Known Problems Daughter     No Known Problems Sister     No Known Problems Maternal Grandmother     No Known Problems Maternal Grandfather     No Known Problems Paternal Grandmother      Social History     Socioeconomic History    Marital status: /Civil Union     Spouse name: Not on file    Number of children: Not on file    Years of education: Not on file    Highest education level: Not on file   Occupational History    Not on file   Tobacco Use    Smoking status: Never Smoker    Smokeless tobacco: Never Used   Substance and Sexual Activity    Alcohol use: No    Drug use: No    Sexual activity: Never     Birth control/protection: Post-menopausal   Other Topics Concern    Not on file   Social History Narrative    Not on file     Social Determinants of Health     Financial Resource Strain: Not on file   Food Insecurity: Not on file   Transportation Needs: Not on file   Physical Activity: Not on file   Stress: Not on file   Social Connections: Not on file   Intimate Partner Violence: Not on file   Housing Stability: Not on file       Current Outpatient Medications:     fexofenadine (Allegra Allergy) 180 MG tablet, Take 180 mg by mouth daily, Disp: , Rfl:   Allergies   Allergen Reactions    Pollen Extract Allergic Rhinitis     Vitals:    04/27/22 0919   BP: 136/80   Pulse: 55   Resp: 16   Temp: 97 7 °F (36 5 °C)   SpO2: 98%       Physical Exam  Constitutional:       Appearance: Normal appearance  HENT:      Head: Normocephalic and atraumatic  Pulmonary:      Effort: Pulmonary effort is normal    Chest:   Breasts:      Right: Skin change (Surgical scar) present  No swelling, bleeding, inverted nipple, mass, nipple discharge, tenderness, axillary adenopathy or supraclavicular adenopathy  Left: Tenderness present  No swelling, bleeding, inverted nipple, mass, nipple discharge, skin change, axillary adenopathy or supraclavicular adenopathy  Comments: Patient examined in the sitting, supine and lateral positions  Patient reports tenderness with palpation in the inferior left breast primarily in the 6:00  Position  Patient reports less pain in this area when examined in the lateral position and also reports tenderness with palpation of lateral chest wall musculature  Lymphadenopathy:      Upper Body:      Right upper body: No supraclavicular or axillary adenopathy  Left upper body: No supraclavicular or axillary adenopathy  Neurological:      General: No focal deficit present  Mental Status: She is alert and oriented to person, place, and time  Psychiatric:         Mood and Affect: Mood normal            Advance Care Planning/Advance Directives:  Discussed disease status and treatment goals with the patient

## 2022-05-26 ENCOUNTER — APPOINTMENT (OUTPATIENT)
Dept: RADIOLOGY | Facility: CLINIC | Age: 57
End: 2022-05-26
Payer: COMMERCIAL

## 2022-05-26 ENCOUNTER — OFFICE VISIT (OUTPATIENT)
Dept: OBGYN CLINIC | Facility: CLINIC | Age: 57
End: 2022-05-26
Payer: COMMERCIAL

## 2022-05-26 VITALS
DIASTOLIC BLOOD PRESSURE: 80 MMHG | HEART RATE: 88 BPM | HEIGHT: 59 IN | WEIGHT: 175 LBS | SYSTOLIC BLOOD PRESSURE: 130 MMHG | BODY MASS INDEX: 35.28 KG/M2

## 2022-05-26 DIAGNOSIS — M25.461 EFFUSION OF RIGHT KNEE: ICD-10-CM

## 2022-05-26 DIAGNOSIS — M25.561 RIGHT KNEE PAIN, UNSPECIFIED CHRONICITY: ICD-10-CM

## 2022-05-26 DIAGNOSIS — Z01.89 ENCOUNTER FOR LOWER EXTREMITY COMPARISON IMAGING STUDY: ICD-10-CM

## 2022-05-26 DIAGNOSIS — M17.11 PRIMARY LOCALIZED OSTEOARTHRITIS OF RIGHT KNEE: Primary | ICD-10-CM

## 2022-05-26 PROCEDURE — 73562 X-RAY EXAM OF KNEE 3: CPT

## 2022-05-26 PROCEDURE — 1036F TOBACCO NON-USER: CPT | Performed by: ORTHOPAEDIC SURGERY

## 2022-05-26 PROCEDURE — 20610 DRAIN/INJ JOINT/BURSA W/O US: CPT | Performed by: ORTHOPAEDIC SURGERY

## 2022-05-26 PROCEDURE — 99214 OFFICE O/P EST MOD 30 MIN: CPT | Performed by: ORTHOPAEDIC SURGERY

## 2022-05-26 PROCEDURE — 3008F BODY MASS INDEX DOCD: CPT | Performed by: ORTHOPAEDIC SURGERY

## 2022-05-26 PROCEDURE — 73560 X-RAY EXAM OF KNEE 1 OR 2: CPT

## 2022-05-26 RX ORDER — LIDOCAINE HYDROCHLORIDE 10 MG/ML
4 INJECTION, SOLUTION INFILTRATION; PERINEURAL
Status: COMPLETED | OUTPATIENT
Start: 2022-05-26 | End: 2022-05-26

## 2022-05-26 RX ORDER — DEXAMETHASONE SODIUM PHOSPHATE 100 MG/10ML
40 INJECTION INTRAMUSCULAR; INTRAVENOUS
Status: COMPLETED | OUTPATIENT
Start: 2022-05-26 | End: 2022-05-26

## 2022-05-26 RX ORDER — NAPROXEN 500 MG/1
500 TABLET ORAL 2 TIMES DAILY WITH MEALS
Qty: 60 TABLET | Refills: 0 | Status: SHIPPED | OUTPATIENT
Start: 2022-05-26

## 2022-05-26 RX ADMIN — DEXAMETHASONE SODIUM PHOSPHATE 40 MG: 100 INJECTION INTRAMUSCULAR; INTRAVENOUS at 15:23

## 2022-05-26 RX ADMIN — LIDOCAINE HYDROCHLORIDE 4 ML: 10 INJECTION, SOLUTION INFILTRATION; PERINEURAL at 15:23

## 2022-05-26 NOTE — PROGRESS NOTES
Assessment/Plan:  1  Primary localized osteoarthritis of right knee  XR knee 3 vw right non injury    naproxen (NAPROSYN) 500 mg tablet   2  Encounter for lower extremity comparison imaging study  XR knee 1 or 2 vw left   3  Effusion of right knee  naproxen (NAPROSYN) 942 mg tablet     Nando Gatica has right-sided knee pain consistent with mild to moderate osteoarthritis is evidence on her x-ray  She appears to have mild to moderate knee effusion at this time  We did attempt a right knee aspiration in the office which was unsuccessful and did not provide any significant fluid aspirated from the knee  We did discuss possibility of proceeding with a right knee cortisone injection and she was agreeable to this  She tolerated a right knee cortisone injection well  Hopefully this gives her significant relief in the next few weeks  She will follow up in the office in 2-3 weeks if the pain persists or worsens  If the pain is much improved then we could see her back on an as-needed basis  If the pain persists or worsens or she develops a large knee effusion we could attempt aspiration again  She was also prescribed oral anti-inflammatory medications in the form naproxen and instructed to strictly ice, compress and elevate the knee for the next several days    Large joint arthrocentesis: R knee  Universal Protocol:  Consent: Verbal consent obtained  Risks and benefits: risks, benefits and alternatives were discussed  Consent given by: patient  Time out: Immediately prior to procedure a "time out" was called to verify the correct patient, procedure, equipment, support staff and site/side marked as required    Site marked: the operative site was marked  Supporting Documentation  Indications: pain   Procedure Details  Location: knee - R knee  Preparation: Patient was prepped and draped in the usual sterile fashion  Needle size: 22 G  Ultrasound guidance: no  Approach: anterolateral  Medications administered: 40 mg dexamethasone 100 mg/10 mL; 4 mL lidocaine 1 %    Patient tolerance: patient tolerated the procedure well with no immediate complications  Dressing:  Sterile dressing applied          Subjective:   Lloyd Pathak is a 62 y o  female who presents to the office for evaluation for right-sided knee pain  She states she has been having significant discomfort in her right knee for the last 2 weeks  She denies any significant injury or trauma  She denies any fall  She denies any locking or catching symptoms in her knee  She feels like her knee is swollen and she has difficulty bending it  She has been trying to take over-the-counter anti-inflammatories and ice her knee  She has not really been elevating but has been  using an over-the-counter knee brace  She has moderate aching throbbing pain over the medial aspect of her right knee  It seems to bother her more with walking or bending her knee  It improves slightly with rest   She has never had swelling in her knee in the past    Review of Systems   Constitutional: Negative for chills, fever and unexpected weight change  HENT: Negative for hearing loss, nosebleeds and sore throat  Eyes: Negative for pain, redness and visual disturbance  Respiratory: Negative for cough, shortness of breath and wheezing  Cardiovascular: Negative for chest pain, palpitations and leg swelling  Gastrointestinal: Negative for abdominal pain, nausea and vomiting  Endocrine: Negative for polydipsia and polyuria  Genitourinary: Negative for dysuria and hematuria  Musculoskeletal:        See HPI   Skin: Negative for rash and wound  Neurological: Negative for dizziness, numbness and headaches  Psychiatric/Behavioral: Negative for decreased concentration and suicidal ideas  The patient is not nervous/anxious            Past Medical History:   Diagnosis Date    Adult-onset obesity     Allergic rhinitis     Disc displacement, cervical     GERD (gastroesophageal reflux disease)     Retinal disorders     last assessed 6/30/14    Tuberculosis, laryngeal     Vitamin D deficiency disease     last assessed 6/30/14       Past Surgical History:   Procedure Laterality Date    BREAST CYST EXCISION Right     phyllodes tumor    BREAST LUMPECTOMY Right 1/14/2020    phyllodes tumor    BREAST LUMPECTOMY Right 2/25/2020    phyllodes tumor    BREAST SURGERY      EAR SURGERY Left     ear drum repair    MAMMO NEEDLE LOCALIZATION RIGHT (ALL INC) Right 1/14/2020    GA BRONCHOSCOPY,DIAGNOSTIC N/A 10/19/2018    Procedure: BRONCHOSCOPY RIGID;  Surgeon: Onofre Marcus MD;  [facility];  Service: ENT    GA LARYNGOSCOPY,DIRCT,OP SCOP,EXC TUMR N/A 10/19/2018    Procedure: EXCISION LARYNGEAL SCAR; SUPRAGLOTTOPLASTY  POSSIBLE JET VENTILATION; CO2 LASER; COBLATOR;  Surgeon: Onofre Marcus MD;  [facility];  Service: ENT    GA LARYNGOSCOPY,DIRCT,OP,BIOPSY N/A 10/19/2018    Procedure: MICRO DIRECT LARYNGOSCOPY;  Surgeon: Onofre Marcus MD;  [facility];  Service: ENT    US GUIDED BREAST BIOPSY RIGHT COMPLETE Right 11/25/2019       Family History   Problem Relation Age of Onset    Lung cancer Mother 76    Liver cancer Brother     No Known Problems Father     No Known Problems Sister     No Known Problems Daughter     No Known Problems Sister     No Known Problems Maternal Grandmother     No Known Problems Maternal Grandfather     No Known Problems Paternal Grandmother        Social History     Occupational History    Not on file   Tobacco Use    Smoking status: Never Smoker    Smokeless tobacco: Never Used   Substance and Sexual Activity    Alcohol use: No    Drug use: No    Sexual activity: Never     Birth control/protection: Post-menopausal         Current Outpatient Medications:     fexofenadine (ALLEGRA) 180 MG tablet, Take 180 mg by mouth daily, Disp: , Rfl:     Allergies   Allergen Reactions    Pollen Extract Allergic Rhinitis Objective:  Vitals:    05/26/22 1118   BP: 130/80   Pulse: 88       Right Knee Exam     Tenderness   The patient is experiencing tenderness in the medial joint line  Range of Motion   Extension: normal   Flexion:  120 abnormal     Tests   Paul:  Medial - negative Lateral - negative  Varus: negative Valgus: negative  Lachman:  Anterior - negative      Drawer:  Anterior - negative    Posterior - negative    Other   Erythema: absent  Sensation: normal  Pulse: present  Swelling: mild  Effusion: effusion present          Observations     Right Knee   Positive for effusion  Physical Exam  Vitals and nursing note reviewed  Constitutional:       Appearance: She is well-developed  HENT:      Head: Normocephalic and atraumatic  Eyes:      General: No scleral icterus  Conjunctiva/sclera: Conjunctivae normal    Cardiovascular:      Rate and Rhythm: Normal rate  Pulmonary:      Effort: Pulmonary effort is normal  No respiratory distress  Musculoskeletal:      Cervical back: Normal range of motion and neck supple  Right knee: Effusion present  Instability Tests: Medial Paul test negative and lateral Paul test negative  Comments: As noted in HPI   Skin:     General: Skin is warm and dry  Neurological:      Mental Status: She is alert and oriented to person, place, and time  Psychiatric:         Behavior: Behavior normal            I have personally reviewed pertinent films in PACS and my interpretation is as follows: Three-view x-rays of the right knee demonstrates mild to moderate osteoarthritis affecting the medial compartment

## 2022-06-09 ENCOUNTER — OFFICE VISIT (OUTPATIENT)
Dept: OBGYN CLINIC | Facility: CLINIC | Age: 57
End: 2022-06-09
Payer: COMMERCIAL

## 2022-06-09 VITALS
DIASTOLIC BLOOD PRESSURE: 84 MMHG | BODY MASS INDEX: 35.28 KG/M2 | WEIGHT: 175 LBS | HEART RATE: 69 BPM | HEIGHT: 59 IN | SYSTOLIC BLOOD PRESSURE: 121 MMHG

## 2022-06-09 DIAGNOSIS — M23.91 INTERNAL DERANGEMENT OF RIGHT KNEE: Primary | ICD-10-CM

## 2022-06-09 PROCEDURE — 1036F TOBACCO NON-USER: CPT | Performed by: ORTHOPAEDIC SURGERY

## 2022-06-09 PROCEDURE — 99213 OFFICE O/P EST LOW 20 MIN: CPT | Performed by: ORTHOPAEDIC SURGERY

## 2022-06-09 PROCEDURE — 3008F BODY MASS INDEX DOCD: CPT | Performed by: ORTHOPAEDIC SURGERY

## 2022-06-09 NOTE — PROGRESS NOTES
Assessment/Plan:  1  Internal derangement of right knee  MRI knee right wo contrast     Mary has discomfort in the right knee and has not respond to conservative measures and has some concerning findings of a potential medial meniscus tear  I would like an MRI of her right knee at this time for further evaluation to discern the next course of action for treatment  She would be a potential surgical candidate if the meniscal tear found  She will follow up in the office after the MRI is complete  Subjective:   Mary Reinoso is a 62 y o  female who presents to the office for follow-up for right-sided knee pain  I saw her in the office 2 weeks ago with acute onset of sharp stabbing pain over the medial aspect of her right knee  At that time we obtained x-rays which showed some mild degenerative changes in the knee and I gave her a right knee cortisone injection  She states that this did not significantly help at all and now she is developing increased pain in the knee and severe discomfort with walking  She feels like her knee is locking certain positions and she has trouble weight-bearing  She denies any fall or injury  Pain significantly increases with any twisting of the knee such as getting in and out of a car or getting up out of bed  She states she has been trying to do some physical activity and exercise as well as walking but this cause severe pain in the knee  Review of Systems   Constitutional: Negative for chills, fever and unexpected weight change  HENT: Negative for hearing loss, nosebleeds and sore throat  Eyes: Negative for pain, redness and visual disturbance  Respiratory: Negative for cough, shortness of breath and wheezing  Cardiovascular: Negative for chest pain, palpitations and leg swelling  Gastrointestinal: Negative for abdominal pain, nausea and vomiting  Endocrine: Negative for polydipsia and polyuria  Genitourinary: Negative for dysuria and hematuria  Musculoskeletal:        See HPI   Skin: Negative for rash and wound  Neurological: Negative for dizziness, numbness and headaches  Psychiatric/Behavioral: Negative for decreased concentration and suicidal ideas  The patient is not nervous/anxious            Past Medical History:   Diagnosis Date    Adult-onset obesity     Allergic rhinitis     Disc displacement, cervical     GERD (gastroesophageal reflux disease)     Retinal disorders     last assessed 6/30/14    Tuberculosis, laryngeal     Vitamin D deficiency disease     last assessed 6/30/14       Past Surgical History:   Procedure Laterality Date    BREAST CYST EXCISION Right     phyllodes tumor    BREAST LUMPECTOMY Right 1/14/2020    phyllodes tumor    BREAST LUMPECTOMY Right 2/25/2020    phyllodes tumor    BREAST SURGERY      EAR SURGERY Left     ear drum repair    MAMMO NEEDLE LOCALIZATION RIGHT (ALL INC) Right 1/14/2020    HI BRONCHOSCOPY,DIAGNOSTIC N/A 10/19/2018    Procedure: BRONCHOSCOPY RIGID;  Surgeon: Alex Villarreal MD;  [facility];  Service: ENT    HI LARYNGOSCOPY,DIRCT,OP SCOP,EXC TUMR N/A 10/19/2018    Procedure: EXCISION LARYNGEAL SCAR; SUPRAGLOTTOPLASTY  POSSIBLE JET VENTILATION; CO2 LASER; COBLATOR;  Surgeon: Alex Villarreal MD;  [facility];  Service: ENT    HI LARYNGOSCOPY,DIRCT,OP,BIOPSY N/A 10/19/2018    Procedure: MICRO DIRECT LARYNGOSCOPY;  Surgeon: Alex Villarreal MD;  [facility];  Service: ENT    US GUIDED BREAST BIOPSY RIGHT COMPLETE Right 11/25/2019       Family History   Problem Relation Age of Onset    Lung cancer Mother 76    Liver cancer Brother     No Known Problems Father     No Known Problems Sister     No Known Problems Daughter     No Known Problems Sister     No Known Problems Maternal Grandmother     No Known Problems Maternal Grandfather     No Known Problems Paternal Grandmother        Social History     Occupational History    Not on file   Tobacco Use    Smoking status: Never Smoker    Smokeless tobacco: Never Used   Substance and Sexual Activity    Alcohol use: No    Drug use: No    Sexual activity: Never     Birth control/protection: Post-menopausal         Current Outpatient Medications:     fexofenadine (ALLEGRA) 180 MG tablet, Take 180 mg by mouth daily, Disp: , Rfl:     naproxen (NAPROSYN) 500 mg tablet, Take 1 tablet (500 mg total) by mouth 2 (two) times a day with meals, Disp: 60 tablet, Rfl: 0    Allergies   Allergen Reactions    Pollen Extract Allergic Rhinitis       Objective:  Vitals:    06/09/22 1040   BP: 121/84   Pulse: 69       Right Knee Exam     Tenderness   The patient is experiencing tenderness in the medial joint line  Range of Motion   Extension: normal   Flexion:  120 abnormal     Tests   Paul:  Medial - positive Lateral - negative  Varus: negative Valgus: negative    Other   Erythema: absent  Sensation: normal  Pulse: present  Swelling: mild  Effusion: effusion present    Comments:  Positive Thessaly test          Observations     Right Knee   Positive for effusion  Physical Exam  Vitals and nursing note reviewed  Constitutional:       Appearance: She is well-developed  HENT:      Head: Normocephalic and atraumatic  Eyes:      General: No scleral icterus  Conjunctiva/sclera: Conjunctivae normal    Cardiovascular:      Rate and Rhythm: Normal rate  Pulmonary:      Effort: Pulmonary effort is normal  No respiratory distress  Musculoskeletal:      Cervical back: Normal range of motion and neck supple  Right knee: Effusion present  Instability Tests: Medial Paul test positive  Lateral Paul test negative  Comments: As noted in HPI   Skin:     General: Skin is warm and dry  Neurological:      Mental Status: She is alert and oriented to person, place, and time     Psychiatric:         Behavior: Behavior normal

## 2022-07-09 ENCOUNTER — HOSPITAL ENCOUNTER (OUTPATIENT)
Dept: MRI IMAGING | Facility: HOSPITAL | Age: 57
Discharge: HOME | End: 2022-07-09
Attending: ORTHOPAEDIC SURGERY
Payer: COMMERCIAL

## 2022-07-09 DIAGNOSIS — M23.91 INTERNAL DERANGEMENT OF RIGHT KNEE: ICD-10-CM

## 2022-07-09 PROCEDURE — G1004 CDSM NDSC: HCPCS

## 2022-07-09 PROCEDURE — 73721 MRI JNT OF LWR EXTRE W/O DYE: CPT

## 2022-07-14 ENCOUNTER — OFFICE VISIT (OUTPATIENT)
Dept: OBGYN CLINIC | Facility: CLINIC | Age: 57
End: 2022-07-14
Payer: COMMERCIAL

## 2022-07-14 VITALS
WEIGHT: 175 LBS | SYSTOLIC BLOOD PRESSURE: 121 MMHG | BODY MASS INDEX: 35.28 KG/M2 | HEIGHT: 59 IN | HEART RATE: 80 BPM | DIASTOLIC BLOOD PRESSURE: 84 MMHG

## 2022-07-14 DIAGNOSIS — F43.0 STRESS RESPONSE: ICD-10-CM

## 2022-07-14 DIAGNOSIS — S83.249A ACUTE TEAR OF POSTERIOR HORN OF MEDIAL MENISCUS: Primary | ICD-10-CM

## 2022-07-14 DIAGNOSIS — M17.11 PRIMARY LOCALIZED OSTEOARTHRITIS OF RIGHT KNEE: ICD-10-CM

## 2022-07-14 PROCEDURE — 99214 OFFICE O/P EST MOD 30 MIN: CPT | Performed by: ORTHOPAEDIC SURGERY

## 2022-07-14 NOTE — PROGRESS NOTES
Assessment/Plan:  1  Acute tear of posterior horn of medial meniscus  Ambulatory referral to Orthopedic Surgery   2  Stress response     3  Primary localized osteoarthritis of right knee         Monster Shown has right-sided knee pain and an MRI demonstrating a posterior horn medial meniscus tear, mild degenerative changes within the knee and an area of increased edema in the distal femur concerning for stress reaction  Given her severity of pain and lack of improvement with cortisone injection and rest I do think it is reasonable to consider a possible meniscectomy in her case given her difficulty weight-bearing and mechanical symptoms  I recommended a short course of minimal or nonweightbearing using of crutches to identify if her pain can be reduced with minimal weight-bearing and heal the stress reaction  She did agree with this  I also recommended evaluation with Dr Roseline Garcia following a few weeks of nonweightbearing status to discuss if meniscectomy would be appropriate  If she chooses not to proceed with surgical management she may be candidate for viscosupplementation  She agreed with this plan will follow-up with Dr Roseline Garcia at the next available appointment to discuss treatment options  Subjective:   Kelly Drew is a 62 y o  female who presents to the office for follow-up for right-sided knee pain  She initially presented to the office 2 months ago with acute onset of sharp stabbing pain over the medial aspect of the right knee  She did receive a right knee cortisone injection this did not significantly help her pain  At last office visit she had difficulty weight-bearing and I sent her for an MRI of the right knee  She returns for results today  She has significant difficulty getting in out of the car or out of bed  She has trouble weight-bearing on this leg and is walking with a limp    She denies any locking or catching of her knee but she does feel like her knee is unstable and will give out on her she is trying to walk  Review of Systems   Constitutional: Negative for chills, fever and unexpected weight change  HENT: Negative for hearing loss, nosebleeds and sore throat  Eyes: Negative for pain, redness and visual disturbance  Respiratory: Negative for cough, shortness of breath and wheezing  Cardiovascular: Negative for chest pain, palpitations and leg swelling  Gastrointestinal: Negative for abdominal pain, nausea and vomiting  Endocrine: Negative for polydipsia and polyuria  Genitourinary: Negative for dysuria and hematuria  Musculoskeletal:        See HPI   Skin: Negative for rash and wound  Neurological: Negative for dizziness, numbness and headaches  Psychiatric/Behavioral: Negative for decreased concentration and suicidal ideas  The patient is not nervous/anxious            Past Medical History:   Diagnosis Date    Adult-onset obesity     Allergic rhinitis     Disc displacement, cervical     GERD (gastroesophageal reflux disease)     Retinal disorders     last assessed 6/30/14    Tuberculosis, laryngeal     Vitamin D deficiency disease     last assessed 6/30/14       Past Surgical History:   Procedure Laterality Date    BREAST CYST EXCISION Right     phyllodes tumor    BREAST LUMPECTOMY Right 1/14/2020    phyllodes tumor    BREAST LUMPECTOMY Right 2/25/2020    phyllodes tumor    BREAST SURGERY      EAR SURGERY Left     ear drum repair    MAMMO NEEDLE LOCALIZATION RIGHT (ALL INC) Right 1/14/2020    ND BRONCHOSCOPY,DIAGNOSTIC N/A 10/19/2018    Procedure: BRONCHOSCOPY RIGID;  Surgeon: Caryle Billings, MD;  [facility];  Service: ENT    ND LARYNGOSCOPY,DIRCT,OP [facility] N/A 10/19/2018    Procedure: EXCISION LARYNGEAL SCAR; SUPRAGLOTTOPLASTY  POSSIBLE JET VENTILATION; CO2 LASER; COBLATOR;  Surgeon: Caryle Billings, MD;  [facility];  Service: ENT    ND LARYNGOSCOPY,DIRCT,OP,BIOPSY N/A 10/19/2018    Procedure: MICRO DIRECT LARYNGOSCOPY; Surgeon: Richard Roland MD;  [facility];  Service: ENT    US GUIDED BREAST BIOPSY RIGHT COMPLETE Right 11/25/2019       Family History   Problem Relation Age of Onset    Lung cancer Mother 76    Liver cancer Brother     No Known Problems Father     No Known Problems Sister     No Known Problems Daughter     No Known Problems Sister     No Known Problems Maternal Grandmother     No Known Problems Maternal Grandfather     No Known Problems Paternal Grandmother        Social History     Occupational History    Not on file   Tobacco Use    Smoking status: Never Smoker    Smokeless tobacco: Never Used   Substance and Sexual Activity    Alcohol use: No    Drug use: No    Sexual activity: Never     Birth control/protection: Post-menopausal         Current Outpatient Medications:     fexofenadine (ALLEGRA) 180 MG tablet, Take 180 mg by mouth daily, Disp: , Rfl:     naproxen (NAPROSYN) 500 mg tablet, Take 1 tablet (500 mg total) by mouth 2 (two) times a day with meals, Disp: 60 tablet, Rfl: 0    Allergies   Allergen Reactions    Pollen Extract Allergic Rhinitis       Objective:  Vitals:    07/14/22 1135   BP: 121/84   Pulse: 80       Right Knee Exam     Tenderness   The patient is experiencing tenderness in the medial joint line  Range of Motion   Extension: normal   Flexion:  140 abnormal     Tests   Paul:  Medial - positive Lateral - negative  Varus: negative Valgus: negative    Other   Erythema: absent  Sensation: normal  Pulse: present  Swelling: none  Effusion: no effusion present          Observations     Right Knee   Negative for effusion  Physical Exam  Vitals and nursing note reviewed  Constitutional:       Appearance: She is well-developed  HENT:      Head: Normocephalic and atraumatic  Eyes:      General: No scleral icterus  Conjunctiva/sclera: Conjunctivae normal    Cardiovascular:      Rate and Rhythm: Normal rate     Pulmonary:      Effort: Pulmonary effort is normal  No respiratory distress  Musculoskeletal:      Cervical back: Normal range of motion and neck supple  Right knee: No effusion  Instability Tests: Medial Paul test positive  Lateral Paul test negative  Comments: As noted in HPI   Skin:     General: Skin is warm and dry  Neurological:      Mental Status: She is alert and oriented to person, place, and time  Psychiatric:         Behavior: Behavior normal          I have personally reviewed pertinent films in PACS and my interpretation is as follows:  MRI of the right knee demonstrates a large posterior horn medial meniscus tear with moderate medial extrusion  Moderate degenerative changes over the medial compartment    Subchondral marrow edema over the weight-bearing portion of the medial femoral condyle

## 2022-09-15 ENCOUNTER — TELEPHONE (OUTPATIENT)
Dept: FAMILY MEDICINE CLINIC | Facility: CLINIC | Age: 57
End: 2022-09-15

## 2022-10-28 ENCOUNTER — TELEPHONE (OUTPATIENT)
Dept: HEMATOLOGY ONCOLOGY | Facility: CLINIC | Age: 57
End: 2022-10-28

## 2022-10-28 NOTE — TELEPHONE ENCOUNTER
lvm to r/s pts 11/30 appt with solomon at [facility] [place] to see Griselda Oman instead

## 2022-11-07 ENCOUNTER — TELEPHONE (OUTPATIENT)
Dept: HEMATOLOGY ONCOLOGY | Facility: CLINIC | Age: 57
End: 2022-11-07

## 2022-11-07 NOTE — TELEPHONE ENCOUNTER
Appointment Cancellation Or Reschedule     Person calling in Spouse    If other than patient calling, are they listed on the communication consent form? Rafael(Yes)   Provider Samia Nunez   Office Visit Date and Time  11/30/22 1:30 pm   Office Visit Location [place]   Did patient want to reschedule their office appointment? If so, when was it scheduled to? Yes  12/19/22 3:00 pm  Kalpesh Jamil   Did you have STAR scheduled for this appointment? n/a   Do you need STAR set up for your new appointment? If yes, please send to "PATIENT RIDESHARE" pool for STAR rescheduling n/a   If you are cancelling appointment, can we notify STAR to cancel ride? If yes, please send to "PATIENT RIDESHARE" pool for STAR to cancel service n/a   Is this patient calling to reschedule an infusion appointment? no   When is their next infusion appointment? n/a   Is this patient a Chemo patient? no   Reason for Cancellation or Reschedule Returning call regarding reschedule appointment from solomon Solano at the [address] [place] [address]      If the patient is a treatment patient, please route this to the office nurse  If the patient is not on treatment, please route to the office MA  If the patient is a surgical oncology patient, please route to surg/onc clinical pool

## 2022-11-21 ENCOUNTER — HOSPITAL ENCOUNTER (OUTPATIENT)
Dept: RADIOLOGY | Facility: HOSPITAL | Age: 57
Discharge: HOME | End: 2022-11-21

## 2022-11-21 VITALS — HEIGHT: 59 IN | BODY MASS INDEX: 35.28 KG/M2 | WEIGHT: 175 LBS

## 2022-11-21 DIAGNOSIS — Z12.31 VISIT FOR SCREENING MAMMOGRAM: ICD-10-CM

## 2022-12-19 ENCOUNTER — OFFICE VISIT (OUTPATIENT)
Dept: SURGICAL ONCOLOGY | Facility: CLINIC | Age: 57
End: 2022-12-19

## 2022-12-19 VITALS
HEIGHT: 59 IN | RESPIRATION RATE: 18 BRPM | SYSTOLIC BLOOD PRESSURE: 128 MMHG | TEMPERATURE: 96.8 F | OXYGEN SATURATION: 98 % | WEIGHT: 196.5 LBS | HEART RATE: 65 BPM | BODY MASS INDEX: 39.61 KG/M2 | DIASTOLIC BLOOD PRESSURE: 84 MMHG

## 2022-12-19 DIAGNOSIS — Z12.31 VISIT FOR SCREENING MAMMOGRAM: ICD-10-CM

## 2022-12-19 DIAGNOSIS — D48.60 PHYLLODES TUMOR OF BREAST: Primary | ICD-10-CM

## 2022-12-19 NOTE — PROGRESS NOTES
Surgical Oncology Follow Up       [address] [place]  [facility]  [address] [address]    To [place]  0/43/2891  [id number]  [address] [facility] SURGICAL ONCOLOGY [facility]  [address] [address]    Chief Complaint   Patient presents with   • Follow-up     1 year follow-up       Assessment/Plan:  1  Phyllodes tumor of breast  - prn    2  Visit for screening mammogram  - Mammo screening bilateral w 3d & cad; Future       Discussion/Summary: Pt is a 62year old female presenting for a 6-month breast follow-up  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  She underwent a right lumpectomy with reexcision of margins with Dr Mancera Hempstead  She had bilateral screening mammogram on 11/21/2022 which was BI-RADS 2 category 2 density  Patient stated that the breast pain examined with Gordon  6 months ago has resolved  There were no concerns on her breast exam  Patient has now been seen for 3 years for the phyllodes tumor  She may now follow-up with her family care provider for clinical breast exam   Patient stated that her old PCP has since left the practice so I recommended she find a new primary care provider  She was instructed to call with any questions or concerns in the future  All questions were answered today  History of Present Illness:     Oncology History   Phyllodes tumor of breast   11/25/2019 Biopsy    Right breast biopsy  Cellular fibroepithelial lesion with adjacent cellular atypia    Excisional biopsy recommended  Mass covers 3 2 cm  US right axilla negative  Left breast clear       1/14/2020 Surgery    Right breast needle localized lumpectomy  Low-grade phyllodes tumor  Focal margin involved     2/25/2020 Surgery    Right breast re-excision lumpectomy margins  Benign breast tissue with organizing fat necrosis, features compatible with prior biopsy site changes  Fibrocystic changes including usual ductal hyperplasia  No tumor identified          -Interval History: Pt is a 62year old female presenting for a 6-month breast follow-up  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  She had bilateral screening mammogram on 11/21/2022 which was BI-RADS 2 category 2 density  Patient denies changes on her breast exam       Review of Systems:  Review of Systems   Constitutional: Negative for activity change, appetite change, fatigue and unexpected weight change  Respiratory: Negative for cough and shortness of breath  Cardiovascular: Negative for chest pain  Gastrointestinal: Negative for abdominal pain, diarrhea, nausea and vomiting  Endocrine: Negative for heat intolerance  Musculoskeletal: Negative for arthralgias, back pain and myalgias  Skin: Negative for rash  Neurological: Negative for weakness and headaches  Hematological: Negative for adenopathy         Patient Active Problem List   Diagnosis   • Dyspnea on exertion   • Dysphonia   • Laryngeal stenosis   • Cough   • Gastroesophageal reflux disease   • Reflux laryngitis   • Mixed conductive and sensorineural hearing loss of right ear with restricted hearing of left ear   • Perforation of right tympanic membrane   • Phyllodes tumor of breast   • Breast pain, left   • Chest wall tenderness     Past Medical History:   Diagnosis Date   • Adult-onset obesity    • Allergic rhinitis    • Disc displacement, cervical    • GERD (gastroesophageal reflux disease)    • Retinal disorders     last assessed 6/30/14   • Tuberculosis, laryngeal    • Vitamin D deficiency disease     last assessed 6/30/14     Past Surgical History:   Procedure Laterality Date   • BREAST CYST EXCISION Right     phyllodes tumor   • BREAST LUMPECTOMY Right 1/14/2020    phyllodes tumor   • BREAST LUMPECTOMY Right 2/25/2020    phyllodes tumor   • BREAST SURGERY     • EAR SURGERY Left     ear drum repair   • MAMMO NEEDLE LOCALIZATION RIGHT (ALL INC) Right 1/14/2020   • VT BRONCHOSCOPY,DIAGNOSTIC N/A 10/19/2018    Procedure: BRONCHOSCOPY RIGID;  Surgeon: Basilio Ham MD;  [facility];  Service: ENT   • VT LARYNGOSCOPY,DIRCT,OP SCOP,EXC TUMR N/A 10/19/2018    Procedure: EXCISION LARYNGEAL SCAR; SUPRAGLOTTOPLASTY  POSSIBLE JET VENTILATION; CO2 LASER; COBLATOR;  Surgeon: Basilio Ham MD;  [facility];  Service: ENT   • VT LARYNGOSCOPY,DIRCT,OP,BIOPSY N/A 10/19/2018    Procedure: MICRO DIRECT LARYNGOSCOPY;  Surgeon: Basilio Ham MD;  [facility];  Service: ENT   • US GUIDED BREAST BIOPSY RIGHT COMPLETE Right 11/25/2019     Family History   Problem Relation Age of Onset   • Lung cancer Mother 76   • Liver cancer Brother    • No Known Problems Father    • No Known Problems Sister    • No Known Problems Daughter    • No Known Problems Sister    • No Known Problems Maternal Grandmother    • No Known Problems Maternal Grandfather    • No Known Problems Paternal Grandmother      Social History     Socioeconomic History   • Marital status: /Civil Union     Spouse name: Not on file   • Number of children: Not on file   • Years of education: Not on file   • Highest education level: Not on file   Occupational History   • Not on file   Tobacco Use   • Smoking status: Never   • Smokeless tobacco: Never   Substance and Sexual Activity   • Alcohol use: No   • Drug use: No   • Sexual activity: Never     Birth control/protection: Post-menopausal   Other Topics Concern   • Not on file   Social History Narrative   • Not on file     Social Determinants of Health     Financial Resource Strain: Not on file   Food Insecurity: Not on file   Transportation Needs: Not on file   Physical Activity: Not on file   Stress: Not on file   Social Connections: Not on file   Intimate Partner Violence: Not on file   Housing Stability: Not on file       Current Outpatient Medications:   •  fexofenadine (ALLEGRA) 180 MG tablet, Take 180 mg by mouth daily, Disp: , Rfl:   •  naproxen (NAPROSYN) 500 mg tablet, Take 1 tablet (500 mg total) by mouth 2 (two) times a day with meals, Disp: 60 tablet, Rfl: 0  Allergies   Allergen Reactions   • Pollen Extract Allergic Rhinitis     Vitals:    12/19/22 1457   BP: 128/84   Pulse: 65   Resp: 18   Temp: (!) 96 8 °F (36 °C)   SpO2: 98%       Physical Exam  Constitutional:       General: She is not in acute distress  Appearance: Normal appearance  Cardiovascular:      Rate and Rhythm: Normal rate and regular rhythm  Pulses: Normal pulses  Heart sounds: Normal heart sounds  Pulmonary:      Effort: Pulmonary effort is normal       Breath sounds: Normal breath sounds  Chest:      Chest wall: No mass  Breasts:     Right: No swelling, bleeding, inverted nipple, mass, nipple discharge, skin change or tenderness  Left: No swelling, bleeding, inverted nipple, mass, nipple discharge, skin change or tenderness  Comments: Right breast lumpectomy scar  No masses, nodularity, skin changes, nipple changes or discharge, or adenopathy appreciated on physical exam      Abdominal:      General: Abdomen is flat  Palpations: Abdomen is soft  Lymphadenopathy:      Upper Body:      Right upper body: No supraclavicular, axillary or pectoral adenopathy  Left upper body: No supraclavicular, axillary or pectoral adenopathy  Skin:     General: Skin is warm  Neurological:      General: No focal deficit present  Mental Status: She is alert and oriented to person, place, and time  Psychiatric:         Mood and Affect: Mood normal          Behavior: Behavior normal            Results:    Imaging  Mammo screening bilateral w 3d & cad    Result Date: 11/28/2022  Narrative: DIAGNOSIS: Visit for screening mammogram TECHNIQUE: Digital screening mammography was performed  Computer Aided Detection (CAD) analyzed all applicable images   COMPARISONS: Prior breast imaging dated: 10/26/2019 and 05/29/2018 RELEVANT HISTORY: Family Breast Cancer History: No known family history of breast cancer  Family Medical History: No known relevant family medical history  Personal History: No known relevant hormone history  Surgical history includes lumpectomy and breast excisional biopsy  No known relevant medical history  The patient is scheduled in a reminder system for screening mammography  8-10% of cancers will be missed on mammography  Management of a palpable abnormality must be based on clinical grounds  Patients will be notified of their results via letter from our facility  Accredited by [employer] of [employer] and FDA  RISK ASSESSMENT: 5 Year Tyrer-Cuzick: 0 9 % 10 Year Tyrer-Cuzick: 2 % Lifetime Tyrer-Cuzick: 5 88 % TISSUE DENSITY: There are scattered areas of fibroglandular density  INDICATION: Sophy Austin is a 62 y o  female presenting for screening mammography  FINDINGS: Bilateral There are no suspicious masses, grouped microcalcifications or areas of unexplained architectural distortion  There are stable postsurgical changes in the outer posterior right breast from prior excision of a phyllodes tumor  The skin and nipple areolar complex are unremarkable  Impression: No mammographic evidence of malignancy  No significant change ASSESSMENT/BI-RADS CATEGORY: Left: 1 - Negative Right: 2 - Benign Overall: 2 - Benign RECOMMENDATION:      - Routine screening mammogram in 1 year for both breasts  Workstation ID: [id number]       I reviewed the above imaging data  Advance Care Planning/Advance Directives:  Discussed disease status, cancer treatment plans and/or cancer treatment goals with the patient

## 2023-04-24 ENCOUNTER — OFFICE VISIT (OUTPATIENT)
Dept: FAMILY MEDICINE CLINIC | Facility: CLINIC | Age: 58
End: 2023-04-24

## 2023-04-24 VITALS
DIASTOLIC BLOOD PRESSURE: 88 MMHG | SYSTOLIC BLOOD PRESSURE: 129 MMHG | HEIGHT: 59 IN | RESPIRATION RATE: 18 BRPM | BODY MASS INDEX: 37.5 KG/M2 | OXYGEN SATURATION: 97 % | WEIGHT: 186 LBS | HEART RATE: 69 BPM | TEMPERATURE: 96.7 F

## 2023-04-24 DIAGNOSIS — H60.501 ACUTE OTITIS EXTERNA OF RIGHT EAR, UNSPECIFIED TYPE: Primary | ICD-10-CM

## 2023-04-24 DIAGNOSIS — Z91.09 ALLERGY TO ENVIRONMENTAL FACTORS: ICD-10-CM

## 2023-04-24 PROBLEM — K21.9 GASTROESOPHAGEAL REFLUX DISEASE: Status: RESOLVED | Noted: 2019-03-03 | Resolved: 2023-04-24

## 2023-04-24 PROBLEM — N64.4 BREAST PAIN, LEFT: Status: RESOLVED | Noted: 2022-04-27 | Resolved: 2023-04-24

## 2023-04-24 PROBLEM — R07.89 CHEST WALL TENDERNESS: Status: RESOLVED | Noted: 2022-04-27 | Resolved: 2023-04-24

## 2023-04-24 PROBLEM — J04.0 REFLUX LARYNGITIS: Status: RESOLVED | Noted: 2019-03-03 | Resolved: 2023-04-24

## 2023-04-24 PROBLEM — K21.9 REFLUX LARYNGITIS: Status: RESOLVED | Noted: 2019-03-03 | Resolved: 2023-04-24

## 2023-04-24 RX ORDER — FLUTICASONE PROPIONATE 50 MCG
1 SPRAY, SUSPENSION (ML) NASAL DAILY
Qty: 9.9 ML | Refills: 2 | Status: SHIPPED | OUTPATIENT
Start: 2023-04-24

## 2023-04-24 RX ORDER — CIPROFLOXACIN 0.5 MG/.25ML
0.2 SOLUTION/ DROPS AURICULAR (OTIC) 2 TIMES DAILY
Qty: 1 EACH | Refills: 0 | Status: SHIPPED | OUTPATIENT
Start: 2023-04-24

## 2023-04-24 RX ORDER — CIPROFLOXACIN 0.5 MG/.25ML
0.2 SOLUTION/ DROPS AURICULAR (OTIC) 2 TIMES DAILY
Qty: 1 EACH | Refills: 0 | Status: CANCELLED | OUTPATIENT
Start: 2023-04-24

## 2023-04-24 RX ORDER — CETIRIZINE HYDROCHLORIDE 10 MG/1
10 TABLET ORAL DAILY
Qty: 90 TABLET | Refills: 2 | Status: SHIPPED | OUTPATIENT
Start: 2023-04-24

## 2023-04-24 RX ORDER — DIAPER,BRIEF,INFANT-TODD,DISP
EACH MISCELLANEOUS 4 TIMES DAILY PRN
Qty: 30 G | Refills: 0 | Status: SHIPPED | OUTPATIENT
Start: 2023-04-24

## 2023-04-24 NOTE — PROGRESS NOTES
[facility] Office visit    Assessment/Plan:     1  Acute otitis externa of right ear, unspecified type  Comments:  Pt also has external ear irritation with pimples with white heads  advised to use hydrocortisone cream outer ear and cipro ear drops for otitis externa  Orders:  -     Ciprofloxacin HCl 0 2 % otic solution; Administer 0 2 mL to the right ear 2 (two) times a day    2  Allergy to environmental factors  -     cetirizine (ZyrTEC) 10 mg tablet; Take 1 tablet (10 mg total) by mouth daily  -     fluticasone (FLONASE) 50 mcg/act nasal spray; 1 spray into each nostril daily  -     hydrocortisone 1 % cream; Apply topically 4 (four) times a day as needed for irritation or rash (ourter ear irritation)          Return in about 1 week (around 5/1/2023) for Annual physical      Subjective:   Earache   There is pain in both ears  This is a recurrent problem  Episode onset: about 1 month ago for right, left about 3 days  The problem occurs constantly  The problem has been waxing and waning  There has been no fever  The pain is at a severity of 7/10  The pain is moderate  Associated symptoms include coughing (allergy), headaches (most likely due to dehydration), a rash (in both ear), rhinorrhea (allergy) and a sore throat (allergy)  Pertinent negatives include no abdominal pain, diarrhea, ear discharge, hearing loss, neck pain or vomiting  Treatments tried: topical neosporin  The treatment provided no relief  There is no history of a chronic ear infection, hearing loss or a tympanostomy tube  Lilian Sandoval is a 62 y o  female outer ear infection     Review of Systems   Constitutional: Negative for chills and fever  HENT: Positive for ear pain, postnasal drip (allergy), rhinorrhea (allergy) and sore throat (allergy)  Negative for ear discharge and hearing loss  Eyes: Negative for pain and visual disturbance  Respiratory: Positive for cough (allergy)  Negative for shortness of breath  "  Cardiovascular: Negative for chest pain and palpitations  Gastrointestinal: Negative for abdominal pain, constipation, diarrhea, nausea and vomiting  Genitourinary: Negative for dysuria and hematuria  Musculoskeletal: Negative for arthralgias, back pain, neck pain and neck stiffness  Skin: Positive for rash (in both ear)  Negative for color change  Neurological: Positive for headaches (most likely due to dehydration)  Negative for dizziness and weakness  Psychiatric/Behavioral: Negative for agitation and confusion  The patient is not nervous/anxious  All other systems reviewed and are negative  Objective:     /88 (BP Location: Left arm, Patient Position: Sitting, Cuff Size: Large)   Pulse 69   Temp (!) 96 7 °F (35 9 °C) (Tympanic Core)   Resp 18   Ht 4' 11\" (1 499 m)   Wt 84 4 kg (186 lb)   SpO2 97%   BMI 37 57 kg/m²      Physical Exam  Vitals reviewed  Constitutional:       General: She is not in acute distress  Appearance: Normal appearance  She is obese  She is not ill-appearing  HENT:      Head: Normocephalic and atraumatic  Right Ear: External ear normal  Laceration (outer ear with cut from scratching) and drainage (yellow) present  Tympanic membrane is perforated  Left Ear: Tympanic membrane, ear canal and external ear normal       Ears:        Nose: Nose normal  No congestion or rhinorrhea  Mouth/Throat:      Mouth: Mucous membranes are moist    Eyes:      Extraocular Movements: Extraocular movements intact  Conjunctiva/sclera: Conjunctivae normal    Cardiovascular:      Rate and Rhythm: Normal rate and regular rhythm  Pulses: Normal pulses  Heart sounds: Normal heart sounds  No murmur heard  No gallop  Pulmonary:      Effort: Pulmonary effort is normal  No respiratory distress  Breath sounds: Normal breath sounds  No stridor  No wheezing, rhonchi or rales  Chest:      Chest wall: No tenderness     Abdominal:      General: " Abdomen is flat  Bowel sounds are normal  There is no distension  Palpations: Abdomen is soft  Tenderness: There is no abdominal tenderness  There is no guarding  Musculoskeletal:         General: No swelling, tenderness or deformity  Normal range of motion  Cervical back: Normal range of motion and neck supple  Right lower leg: No edema  Left lower leg: No edema  Skin:     General: Skin is warm and dry  Capillary Refill: Capillary refill takes less than 2 seconds  Findings: No bruising, erythema, lesion or rash  Neurological:      General: No focal deficit present  Mental Status: She is alert and oriented to person, place, and time  Psychiatric:         Mood and Affect: Mood normal          Behavior: Behavior normal          Thought Content:  Thought content normal           ** Please Note: This note has been constructed using a voice recognition system **     Izzy Esquivel MD  04/24/23  1:46 PM

## 2023-04-25 ENCOUNTER — TELEPHONE (OUTPATIENT)
Dept: FAMILY MEDICINE CLINIC | Facility: CLINIC | Age: 58
End: 2023-04-25

## 2023-04-25 NOTE — TELEPHONE ENCOUNTER
"Called to obtain [facility] ID number and effective date  Reached vm  vm not set up  Could not leave msg     "

## 2023-05-23 ENCOUNTER — APPOINTMENT (EMERGENCY)
Dept: CT IMAGING | Facility: HOSPITAL | Age: 58
End: 2023-05-23

## 2023-05-23 ENCOUNTER — HOSPITAL ENCOUNTER (EMERGENCY)
Facility: HOSPITAL | Age: 58
Discharge: HOME | End: 2023-05-23
Attending: EMERGENCY MEDICINE | Admitting: EMERGENCY MEDICINE

## 2023-05-23 VITALS
DIASTOLIC BLOOD PRESSURE: 58 MMHG | HEART RATE: 60 BPM | OXYGEN SATURATION: 96 % | SYSTOLIC BLOOD PRESSURE: 125 MMHG | TEMPERATURE: 97.7 F | RESPIRATION RATE: 16 BRPM

## 2023-05-23 DIAGNOSIS — K82.8 GALLBLADDER SLUDGE: ICD-10-CM

## 2023-05-23 DIAGNOSIS — R10.9 ABDOMINAL PAIN: Primary | ICD-10-CM

## 2023-05-23 LAB
2HR DELTA HS TROPONIN: -2 NG/L
ALBUMIN SERPL BCP-MCNC: 4.3 G/DL (ref 3.5–5)
ALP SERPL-CCNC: 58 U/L (ref 34–104)
ALT SERPL W P-5'-P-CCNC: 19 U/L (ref 7–52)
ANION GAP SERPL CALCULATED.3IONS-SCNC: 8 MMOL/L (ref 4–13)
AST SERPL W P-5'-P-CCNC: 20 U/L (ref 13–39)
BACTERIA UR QL AUTO: ABNORMAL /HPF
BASOPHILS # BLD AUTO: 0.05 THOUSANDS/ÂΜL (ref 0–0.1)
BASOPHILS NFR BLD AUTO: 1 % (ref 0–1)
BILIRUB SERPL-MCNC: 0.53 MG/DL (ref 0.2–1)
BILIRUB UR QL STRIP: NEGATIVE
BUN SERPL-MCNC: 12 MG/DL (ref 5–25)
CALCIUM SERPL-MCNC: 9.7 MG/DL (ref 8.4–10.2)
CAOX CRY URNS QL MICRO: ABNORMAL /HPF
CARDIAC TROPONIN I PNL SERPL HS: 7 NG/L
CARDIAC TROPONIN I PNL SERPL HS: 9 NG/L
CHLORIDE SERPL-SCNC: 104 MMOL/L (ref 96–108)
CLARITY UR: CLEAR
CO2 SERPL-SCNC: 26 MMOL/L (ref 21–32)
COLOR UR: ABNORMAL
CREAT SERPL-MCNC: 0.74 MG/DL (ref 0.6–1.3)
EOSINOPHIL # BLD AUTO: 0.08 THOUSAND/ÂΜL (ref 0–0.61)
EOSINOPHIL NFR BLD AUTO: 1 % (ref 0–6)
ERYTHROCYTE [DISTWIDTH] IN BLOOD BY AUTOMATED COUNT: 12.4 % (ref 11.6–15.1)
GFR SERPL CREATININE-BSD FRML MDRD: 89 ML/MIN/1.73SQ M
GLUCOSE SERPL-MCNC: 137 MG/DL (ref 65–140)
GLUCOSE UR STRIP-MCNC: NEGATIVE MG/DL
HCT VFR BLD AUTO: 41.9 % (ref 34.8–46.1)
HGB BLD-MCNC: 13.8 G/DL (ref 11.5–15.4)
HGB UR QL STRIP.AUTO: ABNORMAL
IMM GRANULOCYTES # BLD AUTO: 0.03 THOUSAND/UL (ref 0–0.2)
IMM GRANULOCYTES NFR BLD AUTO: 0 % (ref 0–2)
KETONES UR STRIP-MCNC: NEGATIVE MG/DL
LEUKOCYTE ESTERASE UR QL STRIP: NEGATIVE
LIPASE SERPL-CCNC: 27 U/L (ref 11–82)
LYMPHOCYTES # BLD AUTO: 1.58 THOUSANDS/ÂΜL (ref 0.6–4.47)
LYMPHOCYTES NFR BLD AUTO: 16 % (ref 14–44)
MCH RBC QN AUTO: 30 PG (ref 26.8–34.3)
MCHC RBC AUTO-ENTMCNC: 32.9 G/DL (ref 31.4–37.4)
MCV RBC AUTO: 91 FL (ref 82–98)
MONOCYTES # BLD AUTO: 0.47 THOUSAND/ÂΜL (ref 0.17–1.22)
MONOCYTES NFR BLD AUTO: 5 % (ref 4–12)
MUCOUS THREADS UR QL AUTO: ABNORMAL
NEUTROPHILS # BLD AUTO: 7.61 THOUSANDS/ÂΜL (ref 1.85–7.62)
NEUTS SEG NFR BLD AUTO: 77 % (ref 43–75)
NITRITE UR QL STRIP: NEGATIVE
NON-SQ EPI CELLS URNS QL MICRO: ABNORMAL /HPF
NRBC BLD AUTO-RTO: 0 /100 WBCS
PH UR STRIP.AUTO: 5.5 [PH]
PLATELET # BLD AUTO: 290 THOUSANDS/UL (ref 149–390)
PMV BLD AUTO: 9.4 FL (ref 8.9–12.7)
POTASSIUM SERPL-SCNC: 4.4 MMOL/L (ref 3.5–5.3)
PROT SERPL-MCNC: 7.8 G/DL (ref 6.4–8.4)
PROT UR STRIP-MCNC: ABNORMAL MG/DL
RBC # BLD AUTO: 4.6 MILLION/UL (ref 3.81–5.12)
RBC #/AREA URNS AUTO: ABNORMAL /HPF
SODIUM SERPL-SCNC: 138 MMOL/L (ref 135–147)
SP GR UR STRIP.AUTO: 1.02 (ref 1–1.03)
UROBILINOGEN UR STRIP-ACNC: <2 MG/DL
WBC # BLD AUTO: 9.82 THOUSAND/UL (ref 4.31–10.16)
WBC #/AREA URNS AUTO: ABNORMAL /HPF

## 2023-05-23 RX ORDER — KETOROLAC TROMETHAMINE 30 MG/ML
15 INJECTION, SOLUTION INTRAMUSCULAR; INTRAVENOUS ONCE
Status: COMPLETED | OUTPATIENT
Start: 2023-05-23 | End: 2023-05-23

## 2023-05-23 RX ORDER — ONDANSETRON 4 MG/1
4 TABLET, ORALLY DISINTEGRATING ORAL EVERY 8 HOURS PRN
Qty: 12 TABLET | Refills: 0 | Status: SHIPPED | OUTPATIENT
Start: 2023-05-23 | End: 2023-05-27

## 2023-05-23 RX ORDER — PANTOPRAZOLE SODIUM 40 MG/1
40 TABLET, DELAYED RELEASE ORAL DAILY
Qty: 14 TABLET | Refills: 0 | Status: SHIPPED | OUTPATIENT
Start: 2023-05-23 | End: 2023-06-06

## 2023-05-23 RX ORDER — ONDANSETRON 2 MG/ML
4 INJECTION INTRAMUSCULAR; INTRAVENOUS ONCE
Status: COMPLETED | OUTPATIENT
Start: 2023-05-23 | End: 2023-05-23

## 2023-05-23 RX ADMIN — IOHEXOL 100 ML: 350 INJECTION, SOLUTION INTRAVENOUS at 20:27

## 2023-05-23 RX ADMIN — ONDANSETRON 4 MG: 2 INJECTION INTRAMUSCULAR; INTRAVENOUS at 19:30

## 2023-05-23 RX ADMIN — KETOROLAC TROMETHAMINE 15 MG: 30 INJECTION, SOLUTION INTRAMUSCULAR; INTRAVENOUS at 19:32

## 2023-05-23 NOTE — ED PROCEDURE NOTE
Procedure  POC Renal US    Date/Time: 5/23/2023 6:21 PM  Performed by: Nick Whitmore MD  Authorized by: Nick Whitmore MD     Patient location:  ED  Performed by:  Resident  Other Assisting Provider: No    Procedure details:     Exam Type:  Educational    Indications: flank/back pain      Assessment for:  Suspected hydronephrosis    Views obtained: bladder (transverse and sagittal), left kidney and right kidney      Image quality: non-diagnostic      Image availability:  Images available in PACS and video obtained  Findings:     LEFT kidney findings: unremarkable      LEFT hydronephrosis: none      RIGHT kidney findings: unremarkable      RIGHT hydronephrosis: none      Bladder:  Limited quality  Interpretation:     Renal ultrasound impressions: normal exam    POC AAA US    Date/Time: 5/23/2023 6:22 PM  Performed by: Nick Whitmore MD  Authorized by: Nick Whitmore MD     Patient location:  ED  Performing Provider:  Resident  Procedure details:     Exam Type:  Educational    Views Obtained:  Transverse proximal, transverse mid view, transverse distal view and sagittal (longitudinal) view    Image quality: non-diagnostic      Image availability:  Images available in PACS and video obtained  Findings:     Abdominal Aorta Findings: normal      Aneurysm (if present): no abdominal aortic aneurysm      Intra-abdominal fluid: not identified    Interpretation:     Aortic ultrasound impression: aorta normal    POC Biliary US    Date/Time: 5/23/2023 6:22 PM  Performed by: Nick Whitmore MD  Authorized by: Nick Whitmore MD     Patient location:  ED  Performed by:  Resident  Other Assisting Provider: No    Procedure details:     Exam Type:  Educational    Indications: upper right quadrant abdominal pain      Assessment for:  Cholecystitis, cholelithiasis and mass    Views obtained: gallbladder (transverse and longitudinal) and liver      Image quality: limited diagnostic      Image availability:  Images available in PACS and video obtained  Findings:     Cholelithiasis: not identified      Gallbladder wall:  Normal    Gallbladder wall thickness (mm):  3    Pericholecystic fluid: not identified      Sonographic Montero's sign: negative      Polyps: not identified      Mass: not identified    Interpretation:     Biliary ultrasound impressions comment:  Biliary sludge and large volume fluid                     Danita Reeves MD  05/23/23 6370

## 2023-05-24 LAB
ATRIAL RATE: 63 BPM
P AXIS: 54 DEGREES
PR INTERVAL: 170 MS
QRS AXIS: 57 DEGREES
QRSD INTERVAL: 86 MS
QT INTERVAL: 388 MS
QTC INTERVAL: 397 MS
T WAVE AXIS: 44 DEGREES
VENTRICULAR RATE: 63 BPM

## 2023-05-24 NOTE — ED PROVIDER NOTES
History  Chief Complaint   Patient presents with   • Abdominal Pain     C/o intermittent upper abd pain x3 weeks  Denies abd surgery/hx  States slight nausea today  Denies diarrhea/constipation  Hx gerd       History provided by:  Patient   used: No      62year-old presenting to the ER with abdominal pain, intermittent upper abdominal pain for the last 3 weeks  Nausea intermittent  No vomiting  No diarrhea constipation  History of reflux  No history of abdominal surgeries  No chest pain or short of breath  No smoking drugs or alcohol  Prior to Admission Medications   Prescriptions Last Dose Informant Patient Reported? Taking?    Ciprofloxacin HCl 0 2 % otic solution   No No   Sig: Administer 0 2 mL to the right ear 2 (two) times a day   cetirizine (ZyrTEC) 10 mg tablet   No No   Sig: Take 1 tablet (10 mg total) by mouth daily   fluticasone (FLONASE) 50 mcg/act nasal spray   No No   Si spray into each nostril daily   hydrocortisone 1 % cream   No No   Sig: Apply topically 4 (four) times a day as needed for irritation or rash (ourter ear irritation)      Facility-Administered Medications: None       Past Medical History:   Diagnosis Date   • Adult-onset obesity    • Allergic rhinitis    • Disc displacement, cervical    • GERD (gastroesophageal reflux disease)    • Retinal disorders     last assessed 14   • Tuberculosis, laryngeal    • Vitamin D deficiency disease     last assessed 14       Past Surgical History:   Procedure Laterality Date   • BREAST CYST EXCISION Right     phyllodes tumor   • 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   • 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   • BREAST SURGERY     • EAR SURGERY Left     ear drum repair   • MAMMO NEEDLE LOCALIZATION RIGHT (ALL INC) Right 2020   • [place] [address] INCL FLUOR GDNCE DX W/CELL WASHG SPX N/A 10/19/2018    Procedure: BRONCHOSCOPY RIGID;  Surgeon: Augie Rasheed MD;  [facility]: [facility]; Service: ENT   • [place] [address] EXC ANDERSON&/STRPG CORDS/EPIGL MCRSCP/TLSCP N/A 10/19/2018    Procedure: EXCISION LARYNGEAL SCAR; SUPRAGLOTTOPLASTY  POSSIBLE JET VENTILATION; CO2 LASER; COBLATOR;  Surgeon: Claudia Corcoran MD;  Location[facility];  Service: ENT   • [place] LARYNGOSCOPY DIRECT OPERATIVE W/BIOPSY N/A 10/19/2018    Procedure: MICRO DIRECT LARYNGOSCOPY;  Surgeon: Claudia Corcoran MD;  Location: [facility];  Service: ENT   • US GUIDED BREAST BIOPSY RIGHT COMPLETE Right 11/25/2019       Family History   Problem Relation Age of Onset   • Lung cancer Mother 76   • Liver cancer Brother    • No Known Problems Father    • No Known Problems Sister    • No Known Problems Daughter    • No Known Problems Sister    • No Known Problems Maternal Grandmother    • No Known Problems Maternal Grandfather    • No Known Problems Paternal Grandmother      I have reviewed and agree with the history as documented  E-Cigarette/Vaping     E-Cigarette/Vaping Substances     Social History     Tobacco Use   • Smoking status: Never   • Smokeless tobacco: Never   Substance Use Topics   • Alcohol use: No   • Drug use: No       Review of Systems   Constitutional: Negative for chills, diaphoresis and fever  HENT: Negative for congestion and sore throat  Respiratory: Negative for cough, shortness of breath, wheezing and stridor  Cardiovascular: Negative for chest pain, palpitations and leg swelling  Gastrointestinal: Positive for abdominal pain and nausea  Negative for blood in stool, diarrhea and vomiting  Genitourinary: Negative for dysuria, frequency and urgency  Musculoskeletal: Negative for neck pain and neck stiffness  Skin: Negative for pallor and rash  Neurological: Negative for dizziness, syncope, weakness, light-headedness and headaches  All other systems reviewed and are negative  Physical Exam  Physical Exam  Vitals reviewed  Constitutional:       Appearance: She is well-developed     HENT:      Head: Normocephalic and atraumatic  Eyes:      Pupils: Pupils are equal, round, and reactive to light  Cardiovascular:      Rate and Rhythm: Normal rate and regular rhythm  Heart sounds: Normal heart sounds  Pulmonary:      Effort: Pulmonary effort is normal  No respiratory distress  Breath sounds: Normal breath sounds  Abdominal:      General: Bowel sounds are normal       Palpations: Abdomen is soft  Tenderness: There is generalized abdominal tenderness  Musculoskeletal:      Cervical back: Neck supple  Skin:     General: Skin is warm and dry  Capillary Refill: Capillary refill takes less than 2 seconds  Neurological:      General: No focal deficit present  Mental Status: She is alert and oriented to person, place, and time           Vital Signs  ED Triage Vitals   Temperature Pulse Respirations Blood Pressure SpO2   05/23/23 1559 05/23/23 1559 05/23/23 1559 05/23/23 1559 05/23/23 1559   97 7 °F (36 5 °C) 65 16 (!) 187/93 98 %      Temp Source Heart Rate Source Patient Position - Orthostatic VS BP Location FiO2 (%)   05/23/23 1559 05/23/23 1559 05/23/23 1559 05/23/23 1559 --   Oral Monitor Sitting Right arm       Pain Score       05/23/23 1932       4           Vitals:    05/23/23 1900 05/23/23 2000 05/23/23 2145 05/23/23 2215   BP: (!) 176/90 125/58     Pulse: 66 62 70 60   Patient Position - Orthostatic VS:             Visual Acuity      ED Medications  Medications   ketorolac (TORADOL) injection 15 mg (15 mg Intravenous Given 5/23/23 1932)   ondansetron (ZOFRAN) injection 4 mg (4 mg Intravenous Given 5/23/23 1930)   iohexol (OMNIPAQUE) 350 MG/ML injection (SINGLE-DOSE) 100 mL (100 mL Intravenous Given 5/23/23 2027)       Diagnostic Studies  Results Reviewed     Procedure Component Value Units Date/Time    HS Troponin I 2hr [[id number]]  (Normal) Collected: 05/23/23 2044    Lab Status: Final result Specimen: Blood from Hand, Left Updated: 05/23/23 2144     hs TnI 2hr 7 ng/L Delta 2hr hsTnI -2 ng/L     HS Troponin 0hr (reflex protocol) [[id number]]  (Normal) Collected: 05/23/23 1827    Lab Status: Final result Specimen: Blood from Arm, Right Updated: 05/23/23 1921     hs TnI 0hr 9 ng/L     Urine Microscopic [[id number]]  (Abnormal) Collected: 05/23/23 1829    Lab Status: Final result Specimen: Urine, Clean Catch Updated: 05/23/23 1921     RBC, UA 4-10 /hpf      WBC, UA 1-2 /hpf      Epithelial Cells Occasional /hpf      Bacteria, UA None Seen /hpf      MUCUS THREADS Occasional     Ca Oxalate Karina, UA Innumerable /hpf     UA w Reflex to Microscopic w Reflex to Culture [[id number]]  (Abnormal) Collected: 05/23/23 1829    Lab Status: Final result Specimen: Urine, Clean Catch Updated: 05/23/23 1915     Color, UA Light Yellow     Clarity, UA Clear     Specific Gravity, UA 1 022     pH, UA 5 5     Leukocytes, UA Negative     Nitrite, UA Negative     Protein, UA Trace mg/dl      Glucose, UA Negative mg/dl      Ketones, UA Negative mg/dl      Urobilinogen, UA <2 0 mg/dl      Bilirubin, UA Negative     Occult Blood, UA Large    Lipase [[id number]]  (Normal) Collected: 05/23/23 1603    Lab Status: Final result Specimen: Blood from Arm, Right Updated: 05/23/23 1634     Lipase 27 u/L     Comprehensive metabolic panel [[id number]] Collected: 05/23/23 1603    Lab Status: Final result Specimen: Blood from Arm, Right Updated: 05/23/23 1634     Sodium 138 mmol/L      Potassium 4 4 mmol/L      Chloride 104 mmol/L      CO2 26 mmol/L      ANION GAP 8 mmol/L      BUN 12 mg/dL      Creatinine 0 74 mg/dL      Glucose 137 mg/dL      Calcium 9 7 mg/dL      AST 20 U/L      ALT 19 U/L      Alkaline Phosphatase 58 U/L      Total Protein 7 8 g/dL      Albumin 4 3 g/dL      Total Bilirubin 0 53 mg/dL      eGFR 89 ml/min/1 73sq m     Narrative:      Valarie guidelines for Chronic Kidney Disease (CKD):   •  Stage 1 with normal or high GFR (GFR > 90 mL/min/1 73 square meters)  •  Stage 2 Mild CKD (GFR = 60-89 mL/min/1 73 square meters)  •  Stage 3A Moderate CKD (GFR = 45-59 mL/min/1 73 square meters)  •  Stage 3B Moderate CKD (GFR = 30-44 mL/min/1 73 square meters)  •  Stage 4 Severe CKD (GFR = 15-29 mL/min/1 73 square meters)  •  Stage 5 End Stage CKD (GFR <15 mL/min/1 73 square meters)  Note: GFR calculation is accurate only with a steady state creatinine    CBC and differential [[id number]]  (Abnormal) Collected: 05/23/23 1603    Lab Status: Final result Specimen: Blood from Arm, Right Updated: 05/23/23 1620     WBC 9 82 Thousand/uL      RBC 4 60 Million/uL      Hemoglobin 13 8 g/dL      Hematocrit 41 9 %      MCV 91 fL      MCH 30 0 pg      MCHC 32 9 g/dL      RDW 12 4 %      MPV 9 4 fL      Platelets 526 Thousands/uL      nRBC 0 /100 WBCs      Neutrophils Relative 77 %      Immat GRANS % 0 %      Lymphocytes Relative 16 %      Monocytes Relative 5 %      Eosinophils Relative 1 %      Basophils Relative 1 %      Neutrophils Absolute 7 61 Thousands/µL      Immature Grans Absolute 0 03 Thousand/uL      Lymphocytes Absolute 1 58 Thousands/µL      Monocytes Absolute 0 47 Thousand/µL      Eosinophils Absolute 0 08 Thousand/µL      Basophils Absolute 0 05 Thousands/µL                  CT abdomen pelvis with contrast   Final Result by Mandie Rouse MD (05/23 2057)         1  No acute abnormality identified in the abdomen or pelvis  Workstation performed: [id number]                    Procedures  Procedures         ED Course  ED Course as of 05/24/23 2021   Tue May 23, 2023   1842 ECG shows rate of 63, sinus, normal axis, normal QRS, no significant ST or T wave changes, normal intervals, independently interpreted by me                               SBIRT 22yo+    Flowsheet Row Most Recent Value   Initial Alcohol Screen: US AUDIT-C     1  How often do you have a drink containing alcohol? 0 Filed at: 05/23/2023 0791   2   How many drinks containing alcohol do you have on a typical day you are drinking? 0 Filed at: 05/23/2023 2145   3a  Male UNDER 65: How often do you have five or more drinks on one occasion? 0 Filed at: 05/23/2023 2145   3b  FEMALE Any Age, or MALE 65+: How often do you have 4 or more drinks on one occassion? 0 Filed at: 05/23/2023 2145   Audit-C Score 0 Filed at: 05/23/2023 2145   KATE: How many times in the past year have you    Used an illegal drug or used a prescription medication for non-medical reasons? Never Filed at: 05/23/2023 2145                    Medical Decision Making  Patient with generalized abdominal pain  Differential diverticulitis, obstruction, appendicitis, pancreatitis, viral syndrome, renal colic, pyelonephritis  Will get CT abdomen pelvis  CBC to eval for elevated white count  CMP to evaluate LFTs and electrolytes and kidney function  Lipase to evaluate for pancreatitis  UA to evaluate for infection  Pain and nausea control as needed  We will also get EKG and troponin to eval for cardiac ischemia as patient is in her 46s female which can present atypically    Discussed results with patient  She understands importance of following up with family doctor and gastroenterology  Amount and/or Complexity of Data Reviewed  Labs: ordered  Radiology: ordered  Risk  Prescription drug management  Disposition  Final diagnoses:   Abdominal pain   Gallbladder sludge     Time reflects when diagnosis was documented in both MDM as applicable and the Disposition within this note     Time User Action Codes Description Comment    5/23/2023  9:04 PM Leah Lee [R10 9] Abdominal pain     5/23/2023  9:05 PM Leah Vieira Add [K82 8] Gallbladder sludge       ED Disposition     ED Disposition   Discharge    Condition   Stable    Date/Time   Tue May 23, 2023 10:06 PM    Comment   Julisa Hampton discharge to home/self care                 Follow-up Information     Follow up With Specialties Details Why Contact Info Additional Information [address] [facility] Emergency Medicine  As needed, If symptoms worsen [address] [facility] [address] [facility] Emergency Department, [address], [place], [place], [address] [facility] [place] [facility] Schedule an appointment as soon as possible for a visit   [address] [facility] [place] [phone] [facility] [place], [address] [facility], [place], [place], [phone]    [address] Schedule an appointment as soon as possible for a visit  Please follow-up with family doctor [address] [id number] [id number]  , [facility] [place], [place], [address]          Discharge Medication List as of 5/23/2023  9:06 PM      START taking these medications    Details   ondansetron (ZOFRAN-ODT) 4 mg disintegrating tablet Take 1 tablet (4 mg total) by mouth every 8 (eight) hours as needed for nausea or vomiting for up to 4 days, Starting Tue 5/23/2023, Until Sat 5/27/2023 at 2359, Normal      pantoprazole (PROTONIX) 40 mg tablet Take 1 tablet (40 mg total) by mouth daily for 14 days, Starting Tue 5/23/2023, Until Tue 6/6/2023, Normal         CONTINUE these medications which have NOT CHANGED    Details   cetirizine (ZyrTEC) 10 mg tablet Take 1 tablet (10 mg total) by mouth daily, Starting Mon 4/24/2023, Normal      Ciprofloxacin HCl 0 2 % otic solution Administer 0 2 mL to the right ear 2 (two) times a day, Starting Mon 4/24/2023, Normal      fluticasone (FLONASE) 50 mcg/act nasal spray 1 spray into each nostril daily, Starting Mon 4/24/2023, Normal      hydrocortisone 1 % cream Apply topically 4 (four) times a day as needed for irritation or rash (ourter ear irritation), Starting Mon 4/24/2023, Normal                 PDMP Review     None          ED Provider  Electronically Signed by           Zeferino Emery MD  05/24/23 2022

## 2023-05-25 DIAGNOSIS — Z91.09 ALLERGY TO ENVIRONMENTAL FACTORS: ICD-10-CM

## 2023-05-25 RX ORDER — FLUTICASONE PROPIONATE 50 MCG
1 SPRAY, SUSPENSION (ML) NASAL DAILY
Qty: 9.9 ML | Refills: 2 | Status: SHIPPED | OUTPATIENT
Start: 2023-05-25

## 2023-07-12 ENCOUNTER — OFFICE VISIT (OUTPATIENT)
Dept: GASTROENTEROLOGY | Facility: CLINIC | Age: 58
End: 2023-07-12
Payer: COMMERCIAL

## 2023-07-12 ENCOUNTER — HOSPITAL ENCOUNTER (INPATIENT)
Facility: HOSPITAL | Age: 58
LOS: 1 days | Discharge: HOME | DRG: 419 | End: 2023-07-15
Attending: EMERGENCY MEDICINE | Admitting: SURGERY
Payer: COMMERCIAL

## 2023-07-12 ENCOUNTER — APPOINTMENT (EMERGENCY)
Dept: ULTRASOUND IMAGING | Facility: HOSPITAL | Age: 58
DRG: 419 | End: 2023-07-12
Payer: COMMERCIAL

## 2023-07-12 VITALS
WEIGHT: 180 LBS | SYSTOLIC BLOOD PRESSURE: 131 MMHG | BODY MASS INDEX: 36.29 KG/M2 | DIASTOLIC BLOOD PRESSURE: 101 MMHG | HEIGHT: 59 IN | HEART RATE: 82 BPM

## 2023-07-12 DIAGNOSIS — R17 TOTAL BILIRUBIN, ELEVATED: ICD-10-CM

## 2023-07-12 DIAGNOSIS — R10.13 EPIGASTRIC ABDOMINAL PAIN: Primary | ICD-10-CM

## 2023-07-12 DIAGNOSIS — K82.8 GALLBLADDER SLUDGE: ICD-10-CM

## 2023-07-12 DIAGNOSIS — R79.89 ELEVATED LFTS: ICD-10-CM

## 2023-07-12 DIAGNOSIS — K80.20 CHOLELITHIASIS: ICD-10-CM

## 2023-07-12 DIAGNOSIS — K76.89 HEPATIC CYST: ICD-10-CM

## 2023-07-12 DIAGNOSIS — K80.50 CHOLEDOCHOLITHIASIS: ICD-10-CM

## 2023-07-12 LAB
ALBUMIN SERPL BCP-MCNC: 4.6 G/DL (ref 3.5–5)
ALP SERPL-CCNC: 197 U/L (ref 34–104)
ALT SERPL W P-5'-P-CCNC: 732 U/L (ref 7–52)
ANION GAP SERPL CALCULATED.3IONS-SCNC: 11 MMOL/L
AST SERPL W P-5'-P-CCNC: 385 U/L (ref 13–39)
BACTERIA UR QL AUTO: ABNORMAL /HPF
BASOPHILS # BLD AUTO: 0.04 THOUSANDS/ÂΜL (ref 0–0.1)
BASOPHILS NFR BLD AUTO: 1 % (ref 0–1)
BILIRUB DIRECT SERPL-MCNC: 4.17 MG/DL (ref 0–0.2)
BILIRUB SERPL-MCNC: 7.17 MG/DL (ref 0.2–1)
BILIRUB UR QL STRIP: ABNORMAL
BUN SERPL-MCNC: 13 MG/DL (ref 5–25)
CALCIUM SERPL-MCNC: 9.9 MG/DL (ref 8.4–10.2)
CHLORIDE SERPL-SCNC: 101 MMOL/L (ref 96–108)
CLARITY UR: ABNORMAL
CO2 SERPL-SCNC: 24 MMOL/L (ref 21–32)
COLOR UR: ABNORMAL
CREAT SERPL-MCNC: 1.18 MG/DL (ref 0.6–1.3)
EOSINOPHIL # BLD AUTO: 0.08 THOUSAND/ÂΜL (ref 0–0.61)
EOSINOPHIL NFR BLD AUTO: 1 % (ref 0–6)
ERYTHROCYTE [DISTWIDTH] IN BLOOD BY AUTOMATED COUNT: 12.5 % (ref 11.6–15.1)
GFR SERPL CREATININE-BSD FRML MDRD: 50 ML/MIN/1.73SQ M
GLUCOSE SERPL-MCNC: 120 MG/DL (ref 65–140)
GLUCOSE UR STRIP-MCNC: NEGATIVE MG/DL
HCT VFR BLD AUTO: 48.1 % (ref 34.8–46.1)
HGB BLD-MCNC: 16 G/DL (ref 11.5–15.4)
HGB UR QL STRIP.AUTO: ABNORMAL
IMM GRANULOCYTES # BLD AUTO: 0.03 THOUSAND/UL (ref 0–0.2)
IMM GRANULOCYTES NFR BLD AUTO: 0 % (ref 0–2)
KETONES UR STRIP-MCNC: ABNORMAL MG/DL
LEUKOCYTE ESTERASE UR QL STRIP: ABNORMAL
LIPASE SERPL-CCNC: 48 U/L (ref 11–82)
LYMPHOCYTES # BLD AUTO: 1.58 THOUSANDS/ÂΜL (ref 0.6–4.47)
LYMPHOCYTES NFR BLD AUTO: 22 % (ref 14–44)
MCH RBC QN AUTO: 30.5 PG (ref 26.8–34.3)
MCHC RBC AUTO-ENTMCNC: 33.3 G/DL (ref 31.4–37.4)
MCV RBC AUTO: 92 FL (ref 82–98)
MONOCYTES # BLD AUTO: 0.45 THOUSAND/ÂΜL (ref 0.17–1.22)
MONOCYTES NFR BLD AUTO: 6 % (ref 4–12)
MUCOUS THREADS UR QL AUTO: ABNORMAL
NEUTROPHILS # BLD AUTO: 5.16 THOUSANDS/ÂΜL (ref 1.85–7.62)
NEUTS SEG NFR BLD AUTO: 70 % (ref 43–75)
NITRITE UR QL STRIP: NEGATIVE
NON-SQ EPI CELLS URNS QL MICRO: ABNORMAL /HPF
NRBC BLD AUTO-RTO: 0 /100 WBCS
PH UR STRIP.AUTO: 6 [PH]
PLATELET # BLD AUTO: 285 THOUSANDS/UL (ref 149–390)
PMV BLD AUTO: 9.3 FL (ref 8.9–12.7)
POTASSIUM SERPL-SCNC: 4.2 MMOL/L (ref 3.5–5.3)
PROT SERPL-MCNC: 8.5 G/DL (ref 6.4–8.4)
PROT UR STRIP-MCNC: ABNORMAL MG/DL
RBC # BLD AUTO: 5.25 MILLION/UL (ref 3.81–5.12)
RBC #/AREA URNS AUTO: ABNORMAL /HPF
SODIUM SERPL-SCNC: 136 MMOL/L (ref 135–147)
SP GR UR STRIP.AUTO: 1.03 (ref 1–1.03)
UROBILINOGEN UR STRIP-ACNC: 3 MG/DL
WBC # BLD AUTO: 7.34 THOUSAND/UL (ref 4.31–10.16)
WBC #/AREA URNS AUTO: ABNORMAL /HPF

## 2023-07-12 PROCEDURE — 96374 THER/PROPH/DIAG INJ IV PUSH: CPT

## 2023-07-12 PROCEDURE — 99205 OFFICE O/P NEW HI 60 MIN: CPT | Performed by: STUDENT IN AN ORGANIZED HEALTH CARE EDUCATION/TRAINING PROGRAM

## 2023-07-12 PROCEDURE — 81001 URINALYSIS AUTO W/SCOPE: CPT | Performed by: EMERGENCY MEDICINE

## 2023-07-12 PROCEDURE — 76705 ECHO EXAM OF ABDOMEN: CPT

## 2023-07-12 PROCEDURE — 85025 COMPLETE CBC W/AUTO DIFF WBC: CPT | Performed by: EMERGENCY MEDICINE

## 2023-07-12 PROCEDURE — 80074 ACUTE HEPATITIS PANEL: CPT | Performed by: PHYSICIAN ASSISTANT

## 2023-07-12 PROCEDURE — 87086 URINE CULTURE/COLONY COUNT: CPT | Performed by: EMERGENCY MEDICINE

## 2023-07-12 PROCEDURE — 83690 ASSAY OF LIPASE: CPT | Performed by: EMERGENCY MEDICINE

## 2023-07-12 PROCEDURE — 82248 BILIRUBIN DIRECT: CPT | Performed by: EMERGENCY MEDICINE

## 2023-07-12 PROCEDURE — 99285 EMERGENCY DEPT VISIT HI MDM: CPT | Performed by: PHYSICIAN ASSISTANT

## 2023-07-12 PROCEDURE — 86704 HEP B CORE ANTIBODY TOTAL: CPT | Performed by: PHYSICIAN ASSISTANT

## 2023-07-12 PROCEDURE — 80053 COMPREHEN METABOLIC PANEL: CPT | Performed by: EMERGENCY MEDICINE

## 2023-07-12 PROCEDURE — 93005 ELECTROCARDIOGRAM TRACING: CPT

## 2023-07-12 PROCEDURE — 36415 COLL VENOUS BLD VENIPUNCTURE: CPT

## 2023-07-12 PROCEDURE — 99284 EMERGENCY DEPT VISIT MOD MDM: CPT

## 2023-07-12 PROCEDURE — 99222 1ST HOSP IP/OBS MODERATE 55: CPT | Performed by: SURGERY

## 2023-07-12 RX ORDER — SODIUM CHLORIDE, SODIUM LACTATE, POTASSIUM CHLORIDE, CALCIUM CHLORIDE 600; 310; 30; 20 MG/100ML; MG/100ML; MG/100ML; MG/100ML
125 INJECTION, SOLUTION INTRAVENOUS CONTINUOUS
Status: DISCONTINUED | OUTPATIENT
Start: 2023-07-12 | End: 2023-07-13

## 2023-07-12 RX ORDER — OXYCODONE HYDROCHLORIDE 5 MG/1
5 TABLET ORAL EVERY 4 HOURS PRN
Status: DISCONTINUED | OUTPATIENT
Start: 2023-07-12 | End: 2023-07-15 | Stop reason: HOSPADM

## 2023-07-12 RX ORDER — HYDROMORPHONE HCL IN WATER/PF 6 MG/30 ML
0.2 PATIENT CONTROLLED ANALGESIA SYRINGE INTRAVENOUS ONCE
Status: COMPLETED | OUTPATIENT
Start: 2023-07-12 | End: 2023-07-12

## 2023-07-12 RX ORDER — HYDROMORPHONE HCL/PF 1 MG/ML
0.5 SYRINGE (ML) INJECTION EVERY 4 HOURS PRN
Status: DISCONTINUED | OUTPATIENT
Start: 2023-07-12 | End: 2023-07-15 | Stop reason: HOSPADM

## 2023-07-12 RX ORDER — PANTOPRAZOLE SODIUM 40 MG/1
40 TABLET, DELAYED RELEASE ORAL DAILY
Status: DISCONTINUED | OUTPATIENT
Start: 2023-07-13 | End: 2023-07-15 | Stop reason: HOSPADM

## 2023-07-12 RX ORDER — ACETAMINOPHEN 325 MG/1
650 TABLET ORAL EVERY 6 HOURS SCHEDULED
Status: DISCONTINUED | OUTPATIENT
Start: 2023-07-13 | End: 2023-07-15 | Stop reason: HOSPADM

## 2023-07-12 RX ORDER — ENOXAPARIN SODIUM 100 MG/ML
40 INJECTION SUBCUTANEOUS DAILY
Status: DISCONTINUED | OUTPATIENT
Start: 2023-07-13 | End: 2023-07-15 | Stop reason: HOSPADM

## 2023-07-12 RX ORDER — ONDANSETRON 2 MG/ML
4 INJECTION INTRAMUSCULAR; INTRAVENOUS EVERY 6 HOURS PRN
Status: DISCONTINUED | OUTPATIENT
Start: 2023-07-12 | End: 2023-07-15 | Stop reason: HOSPADM

## 2023-07-12 RX ORDER — OXYCODONE HYDROCHLORIDE 10 MG/1
10 TABLET ORAL EVERY 4 HOURS PRN
Status: DISCONTINUED | OUTPATIENT
Start: 2023-07-12 | End: 2023-07-15 | Stop reason: HOSPADM

## 2023-07-12 RX ADMIN — ACETAMINOPHEN 650 MG: 325 TABLET, FILM COATED ORAL at 23:29

## 2023-07-12 RX ADMIN — HYDROMORPHONE HYDROCHLORIDE 0.2 MG: 0.2 INJECTION, SOLUTION INTRAMUSCULAR; INTRAVENOUS; SUBCUTANEOUS at 19:47

## 2023-07-12 RX ADMIN — SODIUM CHLORIDE, SODIUM LACTATE, POTASSIUM CHLORIDE, AND CALCIUM CHLORIDE 125 ML/HR: .6; .31; .03; .02 INJECTION, SOLUTION INTRAVENOUS at 22:15

## 2023-07-12 NOTE — Clinical Note
Case was discussed with Surgery and the patient's admission status was agreed to be Admission Status: inpatient status to the service of Dr. Mccarty.

## 2023-07-12 NOTE — ED PROVIDER NOTES
History  Chief Complaint   Patient presents with   • Abdominal Pain      reports "same thing that she was here for the last 6 weeks, was seen at Dr sander and was told to go to ER for further testing"     Patient is a 49-year-old female with a PMHx of GERD, presenting to the ED for evaluation of epigastric abdominal pain. Patient states that this pain started in May 2023 at which time she presented to this ED for evaluation. Labs and CT a/p at that time were unremarkable. She states that she has had recurrence of this pain with associated nausea over the past 3 days. She had a follow-up appointment with GI sander and was subsequently sent to the ED for expedited work-up. She denies any fevers, chills, vomiting, chest pain, SOB, diarrhea or constipation. She states that her urine has been a dark "orange" color over the past 2 days but denies any dysuria, hematuria, urgency or frequency. Prior to Admission Medications   Prescriptions Last Dose Informant Patient Reported? Taking?    Ciprofloxacin HCl 0.2 % otic solution  Self No No   Sig: Administer 0.2 mL to the right ear 2 (two) times a day   Patient not taking: Reported on 2023   cetirizine (ZyrTEC) 10 mg tablet  Self No No   Sig: Take 1 tablet (10 mg total) by mouth daily   fluticasone (FLONASE) 50 mcg/act nasal spray  Self No No   Si spray into each nostril daily   hydrocortisone 1 % cream  Self No No   Sig: Apply topically 4 (four) times a day as needed for irritation or rash (ourter ear irritation)   ondansetron (ZOFRAN-ODT) 4 mg disintegrating tablet  Self No No   Sig: Take 1 tablet (4 mg total) by mouth every 8 (eight) hours as needed for nausea or vomiting for up to 4 days   Patient not taking: Reported on 2023   pantoprazole (PROTONIX) 40 mg tablet  Self No No   Sig: Take 1 tablet (40 mg total) by mouth daily for 14 days      Facility-Administered Medications: None       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   • BREAST LUMPECTOMY Right 2/25/2020    phyllodes tumor   • BREAST SURGERY     • EAR SURGERY Left     ear drum repair   • MAMMO NEEDLE LOCALIZATION RIGHT (ALL INC) Right 1/14/2020   •  [address] INCL FLUOR GDNCE DX W/CELL WASHG SPX N/A 10/19/2018    Procedure: BRONCHOSCOPY RIGID;  Surgeon: Steph España MD;  Location: [facility];  Service: ENT   •  [place] EXC ANDERSON&/STRPG CORDS/EPIGL MCRSCP/TLSCP N/A 10/19/2018    Procedure: EXCISION LARYNGEAL SCAR; SUPRAGLOTTOPLASTY  POSSIBLE JET VENTILATION; CO2 LASER; COBLATOR;  Surgeon: Steph España MD;  Location: [facility];  Service: ENT   • ND LARYNGOSCOPY DIRECT OPERATIVE W/BIOPSY N/A 10/19/2018    Procedure: MICRO DIRECT LARYNGOSCOPY;  Surgeon: Steph España MD;  Location: [facility];  Service: ENT   • US GUIDED BREAST BIOPSY RIGHT COMPLETE Right 11/25/2019       Family History   Problem Relation Age of Onset   • Lung cancer Mother 76   • Liver cancer Brother    • No Known Problems Father    • No Known Problems Sister    • No Known Problems Daughter    • No Known Problems Sister    • No Known Problems Maternal Grandmother    • No Known Problems Maternal Grandfather    • No Known Problems Paternal Grandmother      I have reviewed and agree with the history as documented.     E-Cigarette/Vaping   • E-Cigarette Use Never User      E-Cigarette/Vaping Substances     Social History     Tobacco Use   • Smoking status: Never   • Smokeless tobacco: Never   Vaping Use   • Vaping Use: Never used   Substance Use Topics   • Alcohol use: No   • Drug use: No       Review of Systems Constitutional: Negative for appetite change, chills, fatigue and fever. HENT: Negative for congestion, rhinorrhea, sinus pressure, sinus pain and sore throat. Eyes: Negative for photophobia and visual disturbance. Respiratory: Negative for cough, shortness of breath and wheezing. Cardiovascular: Negative for chest pain, palpitations and leg swelling. Gastrointestinal: Positive for abdominal pain and nausea. Negative for blood in stool, constipation, diarrhea and vomiting. Genitourinary: Negative for difficulty urinating, dysuria, flank pain, frequency, hematuria and urgency. Musculoskeletal: Negative for arthralgias, back pain, joint swelling, myalgias and neck pain. Neurological: Negative for dizziness, syncope, weakness, light-headedness and headaches. All other systems reviewed and are negative. Physical Exam  Physical Exam  Vitals and nursing note reviewed. Constitutional:       General: She is awake. Appearance: Normal appearance. She is well-developed. She is not toxic-appearing or diaphoretic. HENT:      Head: Normocephalic and atraumatic. Right Ear: External ear normal.      Left Ear: External ear normal.      Nose: Nose normal.      Mouth/Throat:      Lips: Pink. Mouth: Mucous membranes are moist.   Eyes:      General: Lids are normal. Scleral icterus present. Conjunctiva/sclera: Conjunctivae normal.      Pupils: Pupils are equal, round, and reactive to light. Cardiovascular:      Rate and Rhythm: Normal rate and regular rhythm. Pulses: Normal pulses. Radial pulses are 2+ on the right side and 2+ on the left side. Heart sounds: Normal heart sounds, S1 normal and S2 normal.   Pulmonary:      Effort: Pulmonary effort is normal. No accessory muscle usage. Breath sounds: Normal breath sounds. No stridor. No decreased breath sounds, wheezing, rhonchi or rales. Abdominal:      General: Abdomen is flat.  Bowel sounds are normal. There is no distension. Palpations: Abdomen is soft. Tenderness: There is abdominal tenderness in the right upper quadrant and epigastric area. There is no right CVA tenderness, left CVA tenderness, guarding or rebound. Musculoskeletal:      Cervical back: Full passive range of motion without pain and neck supple. No signs of trauma. No pain with movement. Right lower leg: No edema. Left lower leg: No edema. Lymphadenopathy:      Cervical: No cervical adenopathy. Skin:     General: Skin is warm and dry. Capillary Refill: Capillary refill takes less than 2 seconds. Coloration: Skin is not cyanotic, jaundiced or pale. Neurological:      Mental Status: She is alert and oriented to person, place, and time. GCS: GCS eye subscore is 4. GCS verbal subscore is 5. GCS motor subscore is 6. Gait: Gait normal.   Psychiatric:         Mood and Affect: Mood normal.         Speech: Speech normal.         Behavior: Behavior is cooperative.          Vital Signs  ED Triage Vitals [07/12/23 1708]   Temperature Pulse Respirations Blood Pressure SpO2   98 °F (36.7 °C) 83 16 134/84 95 %      Temp Source Heart Rate Source Patient Position - Orthostatic VS BP Location FiO2 (%)   Oral Monitor Sitting Right arm --      Pain Score       10 - Worst Possible Pain           Vitals:    07/12/23 2223 07/12/23 2331 07/12/23 2331 07/13/23 0651   BP: 135/72 145/98 145/98 123/76   Pulse: 75 82 81 62   Patient Position - Orthostatic VS: Lying            Visual Acuity      ED Medications  Medications   pantoprazole (PROTONIX) EC tablet 40 mg (40 mg Oral Given 7/13/23 0819)   lactated ringers infusion (125 mL/hr Intravenous New Bag 7/12/23 2215)   ondansetron (ZOFRAN) injection 4 mg (has no administration in time range)   enoxaparin (LOVENOX) subcutaneous injection 40 mg (40 mg Subcutaneous Not Given 7/13/23 0819)   acetaminophen (TYLENOL) tablet 650 mg (650 mg Oral Given 7/13/23 1145)   oxyCODONE (ROXICODONE) IR tablet 5 mg (has no administration in time range)   oxyCODONE (ROXICODONE) immediate release tablet 10 mg (has no administration in time range)   HYDROmorphone (DILAUDID) injection 0.5 mg (0.5 mg Intravenous Given 7/13/23 1258)   HYDROmorphone HCl (DILAUDID) injection 0.2 mg (0.2 mg Intravenous Given 7/12/23 1947)   potassium chloride (K-DUR,KLOR-CON) CR tablet 20 mEq (20 mEq Oral Given 7/13/23 1145)       Diagnostic Studies  Results Reviewed     Procedure Component Value Units Date/Time    Hepatitis panel, acute [[id number]]  (Abnormal) Collected: 07/12/23 1919    Lab Status: Final result Specimen: Blood from Arm, Right Updated: 07/13/23 1234     Hepatitis B Surface Ag Non-reactive     Hep A IgM Non-reactive     Hepatitis C Ab Non-reactive     Hep B C IgM Reactive    Hepatitis B core antibody, total [[id number]] Collected: 07/12/23 1919    Lab Status: In process Specimen: Blood from Arm, Right Updated: 07/13/23 1147    Comprehensive metabolic panel [[id number]]  (Abnormal) Collected: 07/13/23 0509    Lab Status: Final result Specimen: Blood from Arm, Right Updated: 07/13/23 8518     Sodium 137 mmol/L      Potassium 3.7 mmol/L      Chloride 104 mmol/L      CO2 22 mmol/L      ANION GAP 11 mmol/L      BUN 14 mg/dL      Creatinine 0.78 mg/dL      Glucose 90 mg/dL      Calcium 9.2 mg/dL       U/L       U/L      Alkaline Phosphatase 165 U/L      Total Protein 7.3 g/dL      Albumin 4.0 g/dL      Total Bilirubin 6.05 mg/dL      eGFR 84 ml/min/1.73sq m     Narrative:      Specimen Icteric.   [facility] guidelines for Chronic Kid[facility] Disease (CKD):   •  Stage 1 with normal or high GFR (GFR > 90 mL/min/1.73 square meters)  •  Stage 2 Mild CKD (GFR = 60-89 mL/min/1.73 square meters)  •  Stage 3A Moderate CKD (GFR = 45-59 mL/min/1.73 square meters)  •  Stage 3B Moderate CKD (GFR = 30-44 mL/min/1.73 square meters)  •  Stage 4 Severe CKD (GFR = 15-29 mL/min/1.73 square meters)  •  Stage 5 End Stage CKD (GFR <15 mL/min/1.73 square meters)  Note: GFR calculation is accurate only with a steady state creatinine    CBC [[id number]]  (Normal) Collected: 07/13/23 0509    Lab Status: Final result Specimen: Blood from Arm, Right Updated: 07/13/23 0539     WBC 6.72 Thousand/uL      RBC 4.78 Million/uL      Hemoglobin 14.3 g/dL      Hematocrit 43.5 %      MCV 91 fL      MCH 29.9 pg      MCHC 32.9 g/dL      RDW 12.6 %      Platelets 649 Thousands/uL      MPV 9.3 fL     Bilirubin, direct [[id number]]  (Abnormal) Collected: 07/12/23 2140    Lab Status: Final result Specimen: Blood from Arm, Right Updated: 07/12/23 2215     Bilirubin, Direct 4.17 mg/dL     Narrative:      Specimen Icteric. Urine Microscopic [[id number]]  (Abnormal) Collected: 07/12/23 1934    Lab Status: Final result Specimen: Urine, Clean Catch Updated: 07/12/23 2017     RBC, UA 4-10 /hpf      WBC, UA 30-50 /hpf      Epithelial Cells Occasional /hpf      Bacteria, UA Occasional /hpf      MUCUS THREADS Occasional    Urine culture [[id number]] Collected: 07/12/23 1934    Lab Status:  In process Specimen: Urine, Clean Catch Updated: 07/12/23 2017    UA w Reflex to Microscopic w Reflex to Culture [[id number]]  (Abnormal) Collected: 07/12/23 1934    Lab Status: Final result Specimen: Urine, Clean Catch Updated: 07/12/23 1956     Color, UA Dark Yellow     Clarity, UA Turbid     Specific Gravity, UA 1.026     pH, UA 6.0     Leukocytes, UA Large     Nitrite, UA Negative     Protein, UA Trace mg/dl      Glucose, UA Negative mg/dl      Ketones, UA 20 (1+) mg/dl      Urobilinogen, UA 3.0 mg/dl      Bilirubin, UA Moderate     Occult Blood, UA Large    Comprehensive metabolic panel [[id number]]  (Abnormal) Collected: 07/12/23 1714    Lab Status: Final result Specimen: Blood from Arm, Right Updated: 07/12/23 1800     Sodium 136 mmol/L      Potassium 4.2 mmol/L      Chloride 101 mmol/L      CO2 24 mmol/L      ANION GAP 11 mmol/L      BUN 13 mg/dL      Creatinine 1.18 mg/dL      Glucose 120 mg/dL      Calcium 9.9 mg/dL       U/L       U/L      Alkaline Phosphatase 197 U/L      Total Protein 8.5 g/dL      Albumin 4.6 g/dL      Total Bilirubin 7.17 mg/dL      eGFR 50 ml/min/1.73sq m     Narrative:      Specimen Icteric. [facility] guidelines for Chronic Kidney Disease (CKD):   •  Stage 1 with normal or high GFR (GFR > 90 mL/min/1.73 square meters)  •  Stage 2 Mild CKD (GFR = 60-89 mL/min/1.73 square meters)  •  Stage 3A Moderate CKD (GFR = 45-59 mL/min/1.73 square meters)  •  Stage 3B Moderate CKD (GFR = 30-44 mL/min/1.73 square meters)  •  Stage 4 Severe CKD (GFR = 15-29 mL/min/1.73 square meters)  •  Stage 5 End Stage CKD (GFR <15 mL/min/1.73 square meters)  Note: GFR calculation is accurate only with a steady state creatinine    Lipase [[id number]]  (Normal) Collected: 07/12/23 1714    Lab Status: Final result Specimen: Blood from Arm, Right Updated: 07/12/23 1800     Lipase 48 u/L     Narrative:      Specimen Icteric.     CBC and differential [[id number]]  (Abnormal) Collected: 07/12/23 1714    Lab Status: Final result Specimen: Blood from Arm, Right Updated: 07/12/23 1729     WBC 7.34 Thousand/uL      RBC 5.25 Million/uL      Hemoglobin 16.0 g/dL      Hematocrit 48.1 %      MCV 92 fL      MCH 30.5 pg      MCHC 33.3 g/dL      RDW 12.5 %      MPV 9.3 fL      Platelets 601 Thousands/uL      nRBC 0 /100 WBCs      Neutrophils Relative 70 %      Immat GRANS % 0 %      Lymphocytes Relative 22 %      Monocytes Relative 6 %      Eosinophils Relative 1 %      Basophils Relative 1 %      Neutrophils Absolute 5.16 Thousands/µL      Immature Grans Absolute 0.03 Thousand/uL      Lymphocytes Absolute 1.58 Thousands/µL      Monocytes Absolute 0.45 Thousand/µL      Eosinophils Absolute 0.08 Thousand/µL      Basophils Absolute 0.04 Thousands/µL                  US right upper quadrant   Final Result by Narinder Caraballo MD (07/12 2012)      Gallbladder demonstrates stones as well as extensive echogenic sludge and gravel with twinkle artifacts. No abnormality was seen in the gallbladder lumen on the study of May 23 to suggest any polyp. No wall thickening or sonographic Donnella Pears sign is noted. These findings should be correlated clinically for any evidence of acute gallbladder inflammation. Nuclear medicine hepatobiliary study may be useful to assess for cystic duct obstruction. The common duct is borderline at 7 mm given the elevated bilirubin choledocholithiasis is a consideration in the duct caliber appears to have increased since the prior CT. MRCP may be useful. The study was marked in [facility] for immediate notification. Workstation performed: [id number]                    Procedures  Procedures         ED Course  ED Course as of 07/13/23 1303   Wed Jul 12, 2023 2004 TOTAL BILIRUBIN(!): 7.17   2004 Alkaline Phosphatase(!): 197   2004 ALT(!): 732   2004 AST(!): 385   2004 Bilirubin, UA(!): Moderate   2004 Leukocytes, UA(!): Large                                             Medical Decision Making  Patient is a 49-year-old female with a PMHx of GERD, presenting to the ED for evaluation of epigastric abdominal pain. DDx including but not limited to: cholecystitis, biliary colic, choledocholithiasis, pancreatitis, PUD, gastritis, GERD, hepatitis, malignancy. I personally reviewed patient's labs which are notable for a significantly elevated total bilirubin of 7.17, , , alk phos 197. She has no leukocytosis or fevers. Right upper quadrant ultrasound shows borderline ductal dilation with the common bile duct of 7 mm as well as intraluminal gallbladder stones with sludge and gravel. Labs and imaging concerning for choledocholithiasis. General surgery was consulted and admitted patient under their service for further work-up and management.   The work-up and management plan was discussed with patient and patient's  in detail and all questions were answered. Elevated LFTs: acute illness or injury  Epigastric abdominal pain: acute illness or injury  Gallbladder sludge: acute illness or injury  Total bilirubin, elevated: acute illness or injury  Amount and/or Complexity of Data Reviewed  Labs: ordered. Decision-making details documented in ED Course. Radiology: ordered. Discussion of management or test interpretation with external provider(s): Dr. Juliana Palumbo (general surgery)    Risk  Prescription drug management. Decision regarding hospitalization. Disposition  Final diagnoses:   Epigastric abdominal pain   Cholelithiasis   Gallbladder sludge   Total bilirubin, elevated   Elevated LFTs     Time reflects when diagnosis was documented in both MDM as applicable and the Disposition within this note     Time User Action Codes Description Comment    7/12/2023  8:22 PM Wolm Castles Add [R10.13] Epigastric abdominal pain     7/12/2023  8:23 PM Wolm Castles Add [K80.20] Cholelithiasis     7/12/2023  8:23 PM Wolm Castles Add [K82.8] Gallbladder sludge     7/12/2023  8:23 PM Jeanne Sampson Add [R17] Total bilirubin, elevated     7/12/2023  8:23 PM Wolm Castles Add [R79.89] Elevated LFTs       ED Disposition     ED Disposition   Admit    Condition   Stable    Date/Time   Wed Jul 12, 2023 10:10 PM    Comment   Case was discussed with Surgery and the patient's admission status was agreed to be Admission Status: inpatient status to the service of Dr. Zoe Killian. Follow-up Information    None         Current Discharge Medication List      CONTINUE these medications which have NOT CHANGED    Details   cetirizine (ZyrTEC) 10 mg tablet Take 1 tablet (10 mg total) by mouth daily  Qty: 90 tablet, Refills: 2    Associated Diagnoses:  Allergy to environmental factors      Ciprofloxacin HCl 0.2 % otic solution Administer 0.2 mL to the right ear 2 (two) times a day  Qty: 1 each, Refills: 0    Associated Diagnoses: Acute otitis externa of right ear, unspecified type      fluticasone (FLONASE) 50 mcg/act nasal spray 1 spray into each nostril daily  Qty: 9.9 mL, Refills: 2    Associated Diagnoses: Allergy to environmental factors      hydrocortisone 1 % cream Apply topically 4 (four) times a day as needed for irritation or rash (ourter ear irritation)  Qty: 30 g, Refills: 0    Associated Diagnoses: Allergy to environmental factors      ondansetron (ZOFRAN-ODT) 4 mg disintegrating tablet Take 1 tablet (4 mg total) by mouth every 8 (eight) hours as needed for nausea or vomiting for up to 4 days  Qty: 12 tablet, Refills: 0    Associated Diagnoses: Abdominal pain      pantoprazole (PROTONIX) 40 mg tablet Take 1 tablet (40 mg total) by mouth daily for 14 days  Qty: 14 tablet, Refills: 0    Associated Diagnoses: Abdominal pain             No discharge procedures on file.     PDMP Review     None          ED Provider  Electronically Signed by           John Fernandez PA-C  07/13/23 9543

## 2023-07-12 NOTE — PROGRESS NOTES
[facility] - Outpatient Consultation  Liz Kumar 62 y.o. female MRN: [id number]  Encounter: [id number]    PCP:  Jayla Rose MD, [phone]  Referring Provider:  Jayla Rose MD, [phone]    Patient: Liz Kumar, [birth year]  Reason for Referral: abdominal pain    ASSESSMENT/PLAN:  62 y.o. female with history of class II obesity, HLD, GERD, laryngeal TB, and vitamin D deficiency who presents for initial evaluation for abdominal pain. She reports initial symptoms in May for acute upper abdominal pain for which she presented to the ED. Her labs were notable for normal WBC, liver chemistries, and lipase. She had a CT which showed 1.2 cm cyst in the R hepatic lobe with scattered diverticula but was otherwise unrevealing. She reports recurrent symptoms over the last few days with intermittent nausea but otherwise no changes in bowel habits, melena or hematochezia. She has not been taking NSAIDs and denies any sick contacts/travel. She appeared distressed in the office today and her exam was notable for epigastric TTP with +Montero's sign. I would recommend that she present to the ED for expedited evaluation with repeat labs and imaging. I am concerned for a hepatobiliary source but cannot exclude PUD. We also discussed that she may need an EGD for further work-up if her symptoms do not improve. Timing of her follow up will be determined based on her hospital course. Sabine Chaidez MD  Division of Gastroenterology and Hepatology  [address]    ============================================================================  CC/HPI: 62 y.o. female with history of class II obesity, HLD, GERD, laryngeal TB, and vitamin D deficiency who presents for initial evaluation for abdominal pain. She reports initial symptoms in May for acute upper abdominal pain for which she presented to the ED. Her labs were notable for normal WBC, liver chemistries, and lipase.  She had a CT which showed 1.2 cm cyst in the R hepatic lobe with scattered diverticula but was otherwise unrevealing. She reports recurrent symptoms over the last 3 days and cannot recall a precipitating event. She is unsure if her symptoms are provoked by food intake and denies any changes in her bowel habits and bleeding. She does report intermittent nausea and has not been taking NSAIDs. Since her initial presentation, she has had decreased energy, fatigue or subjective chills. She denies any recent sick contacts or travel. She was born in the [place] and emigrated to the [address] [place] [address] in 1997. ROS: Complete review of systems otherwise negative.      PAST MEDICAL/SURGICAL HISTORY:  Past Medical History:   Diagnosis Date   • Adult-onset obesity    • Allergic rhinitis    • Disc displacement, cervical    • GERD (gastroesophageal reflux disease)    • Retinal disorders     last assessed 6/30/14   • Tuberculosis, laryngeal    • Vitamin D deficiency disease     last assessed 6/30/14        Past Surgical History:   Procedure Laterality Date   • BREAST CYST EXCISION Right     phyllodes tumor   • BREAST LUMPECTOMY Right 1/14/2020    phyllodes tumor   • BREAST LUMPECTOMY Right 2/25/2020    phyllodes tumor   • BREAST SURGERY     • EAR SURGERY Left     ear drum repair   • MAMMO NEEDLE LOCALIZATION RIGHT (ALL INC) Right 1/14/2020   •  [address] INCL FLUOR GDNCE DX W/CELL WASHG SPX N/A 10/19/2018    Procedure: BRONCHOSCOPY RIGID;  Surgeon: Bryan Dodge MD;  Location: [facility];  Service: ENT   •  [facility] EXC ANDERSON&/STRPG CORDS/EPIGL MCRSCP/TLSCP N/A 10/19/2018    Procedure: EXCISION LARYNGEAL SCAR; SUPRAGLOTTOPLASTY  POSSIBLE JET VENTILATION; CO2 LASER; COBLATOR;  Surgeon: Bryan Dodge MD;  [facility];  Service: ENT   • NC LARYNGOSCOPY DIRECT OPERATIVE W/BIOPSY N/A 10/19/2018    Procedure: MICRO DIRECT LARYNGOSCOPY;  Surgeon: Bryan Dodge MD;  [facility];  Service: ENT   • US GUIDED BREAST BIOPSY RIGHT COMPLETE Right 11/25/2019       FAMILY/SOCIAL HISTORY:  Family History   Problem Relation Age of Onset   • Lung cancer Mother 76   • Liver cancer Brother    • No Known Problems Father    • No Known Problems Sister    • No Known Problems Daughter    • No Known Problems Sister    • No Known Problems Maternal Grandmother    • No Known Problems Maternal Grandfather    • No Known Problems Paternal Grandmother        Social History     Tobacco Use   • Smoking status: Never   • Smokeless tobacco: Never   Substance Use Topics   • Alcohol use: No   • Drug use: No       MEDICATIONS:  Current Outpatient Medications on File Prior to Visit   Medication Sig Dispense Refill   • cetirizine (ZyrTEC) 10 mg tablet Take 1 tablet (10 mg total) by mouth daily 90 tablet 2   • fluticasone (FLONASE) 50 mcg/act nasal spray 1 spray into each nostril daily 9.9 mL 2   • hydrocortisone 1 % cream Apply topically 4 (four) times a day as needed for irritation or rash (ourter ear irritation) 30 g 0   • pantoprazole (PROTONIX) 40 mg tablet Take 1 tablet (40 mg total) by mouth daily for 14 days 14 tablet 0   • Ciprofloxacin HCl 0.2 % otic solution Administer 0.2 mL to the right ear 2 (two) times a day (Patient not taking: Reported on 7/12/2023) 1 each 0   • ondansetron (ZOFRAN-ODT) 4 mg disintegrating tablet Take 1 tablet (4 mg total) by mouth every 8 (eight) hours as needed for nausea or vomiting for up to 4 days (Patient not taking: Reported on 7/12/2023) 12 tablet 0     No current facility-administered medications on file prior to visit.        Allergies   Allergen Reactions   • Pollen Extract Allergic Rhinitis       PHYSICAL EXAM:  BP (!) 131/101 (BP Location: Left arm, Patient Position: Sitting, Cuff Size: Standard)   Pulse 82   Ht 4' 11" (1.499 m)   Wt 81.6 kg (180 lb)   BMI 36.36 kg/m²   GENERAL: NAD, AAO  HEENT: anicteric, OP clear, MMM  ABDOMEN: severe epigastric and RUQ TTP with +Montero's sign, normoactive BS, no hepatomegaly, spleen not palpable  EXTREMITIES: no edema  SKIN: no rashes, no palmar erythema, no spider angiomata   NEURO: normal gait, no tremor, no asterixis     LABS/RADIOLOGY/ENDOSCOPY:  Lab Results   Component Value Date    WBC 9.82 05/23/2023    HGB 13.8 05/23/2023    HCT 41.9 05/23/2023     05/23/2023    BUN 12 05/23/2023    CREATININE 0.74 05/23/2023     12/05/2017    K 4.4 05/23/2023     05/23/2023    CO2 26 05/23/2023    PROT 7.3 12/05/2017    ALKPHOS 58 05/23/2023    ALT 19 05/23/2023    AST 20 05/23/2023    GLOB 2.6 10/29/2019    CALCIUM 9.7 05/23/2023    EGFR 89 05/23/2023    TRIG 134 08/05/2021    HDL 51 08/05/2021     CT A/P with IV contrast (5/23/2023)  ABDOMEN     LOWER CHEST:  No clinically significant abnormality identified in the visualized lower chest.     LIVER/BILIARY TREE: 1.2 cm cyst in the inferior right lobe of the liver in segment 6. Additional subcentimeter lesion likely representing a small cyst within segment 4B.     GALLBLADDER:  No calcified gallstones. No pericholecystic inflammatory change.     SPLEEN:  Unremarkable.     PANCREAS:  Unremarkable.     ADRENAL GLANDS:  Unremarkable.     KIDNEYS/URETERS: 2.1 cm cyst in the anterior interpolar left kidney.     STOMACH AND BOWEL: Few scattered diverticula are present without evidence for diverticulitis. No bowel wall thickening or intestinal obstruction is identified.     APPENDIX: A normal appendix was visualized.     ABDOMINOPELVIC CAVITY:  No ascites. No pneumoperitoneum. No lymphadenopathy. Small peripherally calcified 1.4 cm lesion in the left posterior superior perivesical fat may represent a focus of fat necrosis versus calcified lymph node.     VESSELS:  Unremarkable for patient's age.     PELVIS     REPRODUCTIVE ORGANS:  Unremarkable for patient's age.     URINARY BLADDER: Mild nonspecific circumferential bladder wall thickening.  Recommend correlation with urinalysis.     ABDOMINAL WALL/INGUINAL REGIONS:  Unremarkable.     OSSEOUS STRUCTURES:  No acute fracture or destructive osseous lesion.     IMPRESSION:     1. No acute abnormality identified in the abdomen or pelvis.       Computed MELD 3.0 unavailable. Necessary lab results were not found in the last year. Computed MELD-Na unavailable. Necessary lab results were not found in the last year.

## 2023-07-13 ENCOUNTER — APPOINTMENT (OUTPATIENT)
Dept: GASTROENTEROLOGY | Facility: HOSPITAL | Age: 58
DRG: 419 | End: 2023-07-13
Payer: COMMERCIAL

## 2023-07-13 ENCOUNTER — ANESTHESIA EVENT (OUTPATIENT)
Dept: ANESTHESIOLOGY | Facility: HOSPITAL | Age: 58
End: 2023-07-13

## 2023-07-13 ENCOUNTER — ANESTHESIA (OUTPATIENT)
Dept: GASTROENTEROLOGY | Facility: HOSPITAL | Age: 58
DRG: 419 | End: 2023-07-13
Payer: COMMERCIAL

## 2023-07-13 ENCOUNTER — ANESTHESIA EVENT (OUTPATIENT)
Dept: GASTROENTEROLOGY | Facility: HOSPITAL | Age: 58
DRG: 419 | End: 2023-07-13
Payer: COMMERCIAL

## 2023-07-13 ENCOUNTER — APPOINTMENT (OUTPATIENT)
Dept: RADIOLOGY | Facility: HOSPITAL | Age: 58
DRG: 419 | End: 2023-07-13
Payer: COMMERCIAL

## 2023-07-13 ENCOUNTER — ANESTHESIA (OUTPATIENT)
Dept: ANESTHESIOLOGY | Facility: HOSPITAL | Age: 58
End: 2023-07-13

## 2023-07-13 LAB
ALBUMIN SERPL BCP-MCNC: 4 G/DL (ref 3.5–5)
ALP SERPL-CCNC: 165 U/L (ref 34–104)
ALT SERPL W P-5'-P-CCNC: 552 U/L (ref 7–52)
ANION GAP SERPL CALCULATED.3IONS-SCNC: 11 MMOL/L
AST SERPL W P-5'-P-CCNC: 227 U/L (ref 13–39)
BILIRUB SERPL-MCNC: 6.05 MG/DL (ref 0.2–1)
BUN SERPL-MCNC: 14 MG/DL (ref 5–25)
CALCIUM SERPL-MCNC: 9.2 MG/DL (ref 8.4–10.2)
CHLORIDE SERPL-SCNC: 104 MMOL/L (ref 96–108)
CO2 SERPL-SCNC: 22 MMOL/L (ref 21–32)
CREAT SERPL-MCNC: 0.78 MG/DL (ref 0.6–1.3)
ERYTHROCYTE [DISTWIDTH] IN BLOOD BY AUTOMATED COUNT: 12.6 % (ref 11.6–15.1)
GFR SERPL CREATININE-BSD FRML MDRD: 84 ML/MIN/1.73SQ M
GLUCOSE SERPL-MCNC: 108 MG/DL (ref 65–140)
GLUCOSE SERPL-MCNC: 90 MG/DL (ref 65–140)
HAV IGM SER QL: ABNORMAL
HBV CORE AB SER QL: REACTIVE
HBV CORE IGM SER QL: REACTIVE
HBV SURFACE AG SER QL: ABNORMAL
HCT VFR BLD AUTO: 43.5 % (ref 34.8–46.1)
HCV AB SER QL: ABNORMAL
HGB BLD-MCNC: 14.3 G/DL (ref 11.5–15.4)
MCH RBC QN AUTO: 29.9 PG (ref 26.8–34.3)
MCHC RBC AUTO-ENTMCNC: 32.9 G/DL (ref 31.4–37.4)
MCV RBC AUTO: 91 FL (ref 82–98)
PLATELET # BLD AUTO: 250 THOUSANDS/UL (ref 149–390)
PMV BLD AUTO: 9.3 FL (ref 8.9–12.7)
POTASSIUM SERPL-SCNC: 3.7 MMOL/L (ref 3.5–5.3)
PROT SERPL-MCNC: 7.3 G/DL (ref 6.4–8.4)
RBC # BLD AUTO: 4.78 MILLION/UL (ref 3.81–5.12)
SODIUM SERPL-SCNC: 137 MMOL/L (ref 135–147)
WBC # BLD AUTO: 6.72 THOUSAND/UL (ref 4.31–10.16)

## 2023-07-13 PROCEDURE — 43264 ERCP REMOVE DUCT CALCULI: CPT | Performed by: INTERNAL MEDICINE

## 2023-07-13 PROCEDURE — 80053 COMPREHEN METABOLIC PANEL: CPT

## 2023-07-13 PROCEDURE — 43262 ENDO CHOLANGIOPANCREATOGRAPH: CPT | Performed by: INTERNAL MEDICINE

## 2023-07-13 PROCEDURE — 82948 REAGENT STRIP/BLOOD GLUCOSE: CPT

## 2023-07-13 PROCEDURE — C1769 GUIDE WIRE: HCPCS

## 2023-07-13 PROCEDURE — 74328 X-RAY BILE DUCT ENDOSCOPY: CPT

## 2023-07-13 PROCEDURE — 0F798ZZ DILATION OF COMMON BILE DUCT, VIA NATURAL OR ARTIFICIAL OPENING ENDOSCOPIC: ICD-10-PCS | Performed by: INTERNAL MEDICINE

## 2023-07-13 PROCEDURE — 99232 SBSQ HOSP IP/OBS MODERATE 35: CPT | Performed by: SURGERY

## 2023-07-13 PROCEDURE — 85027 COMPLETE CBC AUTOMATED: CPT

## 2023-07-13 RX ORDER — ONDANSETRON 2 MG/ML
4 INJECTION INTRAMUSCULAR; INTRAVENOUS ONCE AS NEEDED
Status: DISCONTINUED | OUTPATIENT
Start: 2023-07-13 | End: 2023-07-14 | Stop reason: HOSPADM

## 2023-07-13 RX ORDER — FENTANYL CITRATE 50 UG/ML
INJECTION, SOLUTION INTRAMUSCULAR; INTRAVENOUS AS NEEDED
Status: DISCONTINUED | OUTPATIENT
Start: 2023-07-13 | End: 2023-07-13

## 2023-07-13 RX ORDER — PROPOFOL 10 MG/ML
INJECTION, EMULSION INTRAVENOUS AS NEEDED
Status: DISCONTINUED | OUTPATIENT
Start: 2023-07-13 | End: 2023-07-13

## 2023-07-13 RX ORDER — EPHEDRINE SULFATE 50 MG/ML
INJECTION INTRAVENOUS AS NEEDED
Status: DISCONTINUED | OUTPATIENT
Start: 2023-07-13 | End: 2023-07-13

## 2023-07-13 RX ORDER — SUCCINYLCHOLINE/SOD CL,ISO/PF 100 MG/5ML
SYRINGE (ML) INTRAVENOUS AS NEEDED
Status: DISCONTINUED | OUTPATIENT
Start: 2023-07-13 | End: 2023-07-13

## 2023-07-13 RX ORDER — POTASSIUM CHLORIDE 20 MEQ/1
20 TABLET, EXTENDED RELEASE ORAL ONCE
Status: COMPLETED | OUTPATIENT
Start: 2023-07-13 | End: 2023-07-13

## 2023-07-13 RX ORDER — SODIUM CHLORIDE, SODIUM LACTATE, POTASSIUM CHLORIDE, CALCIUM CHLORIDE 600; 310; 30; 20 MG/100ML; MG/100ML; MG/100ML; MG/100ML
75 INJECTION, SOLUTION INTRAVENOUS CONTINUOUS
Status: DISCONTINUED | OUTPATIENT
Start: 2023-07-13 | End: 2023-07-15

## 2023-07-13 RX ADMIN — POTASSIUM CHLORIDE 20 MEQ: 1500 TABLET, EXTENDED RELEASE ORAL at 11:45

## 2023-07-13 RX ADMIN — HYDROMORPHONE HYDROCHLORIDE 0.5 MG: 1 INJECTION, SOLUTION INTRAMUSCULAR; INTRAVENOUS; SUBCUTANEOUS at 12:58

## 2023-07-13 RX ADMIN — PROPOFOL 150 MG: 10 INJECTION, EMULSION INTRAVENOUS at 16:37

## 2023-07-13 RX ADMIN — ACETAMINOPHEN 650 MG: 325 TABLET, FILM COATED ORAL at 05:00

## 2023-07-13 RX ADMIN — SODIUM CHLORIDE, SODIUM LACTATE, POTASSIUM CHLORIDE, AND CALCIUM CHLORIDE 225 ML/HR: .6; .31; .03; .02 INJECTION, SOLUTION INTRAVENOUS at 20:07

## 2023-07-13 RX ADMIN — IOHEXOL 3 ML: 240 INJECTION, SOLUTION INTRATHECAL; INTRAVASCULAR; INTRAVENOUS; ORAL at 17:10

## 2023-07-13 RX ADMIN — FENTANYL CITRATE 100 MCG: 50 INJECTION INTRAMUSCULAR; INTRAVENOUS at 16:37

## 2023-07-13 RX ADMIN — INDOMETHACIN 100 MG: 50 SUPPOSITORY RECTAL at 16:44

## 2023-07-13 RX ADMIN — SODIUM CHLORIDE, SODIUM LACTATE, POTASSIUM CHLORIDE, AND CALCIUM CHLORIDE 225 ML/HR: .6; .31; .03; .02 INJECTION, SOLUTION INTRAVENOUS at 18:11

## 2023-07-13 RX ADMIN — EPHEDRINE SULFATE 10 MG: 50 INJECTION, SOLUTION INTRAVENOUS at 16:59

## 2023-07-13 RX ADMIN — ACETAMINOPHEN 650 MG: 325 TABLET, FILM COATED ORAL at 11:45

## 2023-07-13 RX ADMIN — EPHEDRINE SULFATE 10 MG: 50 INJECTION, SOLUTION INTRAVENOUS at 16:46

## 2023-07-13 RX ADMIN — PANTOPRAZOLE SODIUM 40 MG: 40 TABLET, DELAYED RELEASE ORAL at 08:19

## 2023-07-13 RX ADMIN — Medication 100 MG: at 16:37

## 2023-07-13 NOTE — ED CARE HANDOFF
Emergency Department Sign Out Note        Sign out and transfer of care from [facility]. See Separate Emergency Department note. The patient, Woody Witt, was evaluated by the previous provider for right upper quadrant and epigastric abdominal pain worsening over the last 3 days and sent in referral from GI outpatient. Workup Completed:  CMP, lipase, CBC, UA, urine micro, right upper quadrant ultrasound    ED Course / Workup Pending (followup):  General surgery evaluation, direct bili, hepatitis panel                                  ED Course as of 07/12/23 2210 Wed Jul 12, 2023 2103 Patient signed out to me by off going [facility]. Work-up is pending surgery evaluation. She has been hemodynamically stable and is without infectious symptoms of fevers, chills, and is without leukocytosis. She has since May approximately 3 months) had intermittent right upper quadrant epigastric abdominal pain for which she was seen in the ED at onset. She has noted ongoing intermittent epigastric and right upper quadrant abdominal pain for which she follow-up with GI today. They sent her in for expedited work-up. Her labs are notable for a transaminitis with grossly elevated total bilirubin of 7.17. She otherwise without electrolyte derangement or LASHAE. Her urine shows an elevated WBC but otherwise occasional bacteria and patient is without symptoms of UTI. Will hold on antibiotics. No leukocytosis or abnormal differential on CBC.   2105 US right upper quadrant  IMPRESSION:     Gallbladder demonstrates stones as well as extensive echogenic sludge and gravel with twinkle artifacts. No abnormality was seen in the gallbladder lumen on the study of May 23 to suggest any polyp.     No wall thickening or sonographic Young Foot sign is noted. These findings should be correlated clinically for any evidence of acute gallbladder inflammation.  Nuclear medicine hepatobiliary study may be useful to assess for cystic duct obstruction.     The common duct is borderline at 7 mm given the elevated bilirubin choledocholithiasis is a consideration in the duct caliber appears to have increased since the prior CT. MRCP may be useful. 2109 I just myself the patient notified her and her  that I am assuming care from off going [facility]. Patient currently notes her pain is well controlled and denies further pain medicine. She notes surgery came by to see her and they are discussing admission. Vital signs remain stable. Will await surgical evaluation and likely admission to surgery service versus [facility]. Procedures  Medical Decision Making  DDx including but not limited to: GERD, gastroparesis, hepatitis, pancreatitis, cholecystitis, biliary colic, choledocholithiasis, UTI    See ED course for further MDM and disposition discussion    Cholelithiasis: acute illness or injury  Elevated LFTs: acute illness or injury  Epigastric abdominal pain: acute illness or injury  Gallbladder sludge: acute illness or injury  Total bilirubin, elevated: acute illness or injury  Amount and/or Complexity of Data Reviewed  Labs: ordered. Radiology: ordered. Decision-making details documented in ED Course. Risk  Prescription drug management. Decision regarding hospitalization.               Disposition  Final diagnoses:   Epigastric abdominal pain   Cholelithiasis   Gallbladder sludge   Total bilirubin, elevated   Elevated LFTs     Time reflects when diagnosis was documented in both MDM as applicable and the Disposition within this note     Time User Action Codes Description Comment    7/12/2023  8:22 PM Amanda Yanet Add [R10.13] Epigastric abdominal pain     7/12/2023  8:23 PM Amanda Yanet Add [K80.20] Cholelithiasis     7/12/2023  8:23 PM Amanda Yanet Add [K82.8] Gallbladder sludge     7/12/2023  8:23 PM Jeanne Sampson Add [R17] Total bilirubin, elevated     7/12/2023  8:23 PM Jeanne Sampson Add [R79.89] Elevated LFTs       ED Disposition     ED Disposition   Admit    Condition   Stable    Date/Time   Wed Jul 12, 2023 10:10 PM    Comment   Case was discussed with Surgery and the patient's admission status was agreed to be Admission Status: inpatient status to the service of Dr. Duyen Us. Follow-up Information    None       Patient's Medications   Discharge Prescriptions    No medications on file     No discharge procedures on file.        ED Provider  Electronically Signed by     Ginny Barrera, [address] [place] [address]  07/12/23 1897

## 2023-07-13 NOTE — ANESTHESIA PREPROCEDURE EVALUATION
Procedure:  ERCP    Relevant Problems   GI/HEPATIC   (+) GERD (gastroesophageal reflux disease)      Respiratory   (+) Laryngeal stenosis        Physical Exam    Airway    Mallampati score: III  TM Distance: >3 FB  Neck ROM: full     Dental   No notable dental hx     Cardiovascular      Pulmonary      Other Findings  Small mouth opening      Anesthesia Plan  ASA Score- 2     Anesthesia Type- general with ASA Monitors. Additional Monitors:   Airway Plan: ETT. Plan Factors-Exercise tolerance (METS): >4 METS. Chart reviewed. EKG reviewed. Existing labs reviewed. Patient summary reviewed. Patient is not a current smoker. Induction- intravenous. Postoperative Plan- Plan for postoperative opioid use. Informed Consent- Anesthetic plan and risks discussed with patient. I personally reviewed this patient with the CRNA. Discussed and agreed on the Anesthesia Plan with the CRNA. Steph Mauro

## 2023-07-13 NOTE — PLAN OF CARE
Problem: PAIN - ADULT  Goal: Verbalizes/displays adequate comfort level or baseline comfort level  Description: Interventions:  - Encourage patient to monitor pain and request assistance  - Assess pain using appropriate pain scale  - Administer analgesics based on type and severity of pain and evaluate response  - Implement non-pharmacological measures as appropriate and evaluate response  - Consider cultural and social influences on pain and pain management  - Notify physician/advanced practitioner if interventions unsuccessful or patient reports new pain  Outcome: Progressing     Problem: INFECTION - ADULT  Goal: Absence or prevention of progression during hospitalization  Description: INTERVENTIONS:  - Assess and monitor for signs and symptoms of infection  - Monitor lab/diagnostic results  - Monitor all insertion sites, i.e. indwelling lines, tubes, and drains  - Monitor endotracheal if appropriate and nasal secretions for changes in amount and color  - Keyes appropriate cooling/warming therapies per order  - Administer medications as ordered  - Instruct and encourage patient and family to use good hand hygiene technique  - Identify and instruct in appropriate isolation precautions for identified infection/condition  Outcome: Progressing  Goal: Absence of fever/infection during neutropenic period  Description: INTERVENTIONS:  - Monitor WBC    Outcome: Progressing     Problem: SAFETY ADULT  Goal: Patient will remain free of falls  Description: INTERVENTIONS:  - Educate patient/family on patient safety including physical limitations  - Instruct patient to call for assistance with activity   - Consult OT/PT to assist with strengthening/mobility   - Keep Call bell within reach  - Keep bed low and locked with side rails adjusted as appropriate  - Keep care items and personal belongings within reach  - Initiate and maintain comfort rounds  - Make Fall Risk Sign visible to staff  - Offer Toileting every 2 Hours, in advance of need  - Initiate/Maintain bed alarm  - Obtain necessary fall risk management equipment  - Apply yellow socks and bracelet for high fall risk patients  - Consider moving patient to room near nurses station  Outcome: Progressing  Goal: Maintain or return to baseline ADL function  Description: INTERVENTIONS:  -  Assess patient's ability to carry out ADLs; assess patient's baseline for ADL function and identify physical deficits which impact ability to perform ADLs (bathing, care of mouth/teeth, toileting, grooming, dressing, etc.)  - Assess/evaluate cause of self-care deficits   - Assess range of motion  - Assess patient's mobility; develop plan if impaired  - Assess patient's need for assistive devices and provide as appropriate  - Encourage maximum independence but intervene and supervise when necessary  - Involve family in performance of ADLs  - Assess for home care needs following discharge   - Consider OT consult to assist with ADL evaluation and planning for discharge  - Provide patient education as appropriate  Outcome: Progressing  Goal: Maintains/Returns to pre admission functional level  Description: INTERVENTIONS:  - Perform BMAT or MOVE assessment daily.   - Set and communicate daily mobility goal to care team and patient/family/caregiver. - Collaborate with rehabilitation services on mobility goals if consulted  - Perform Range of Motion 2 times a day. - Reposition patient every 2 hours.   - Dangle patient 2 times a day  - Stand patient 3 times a day  - Ambulate patient 3 times a day  - Out of bed to chair 3 times a day   - Out of bed for meals 3 times a day  - Out of bed for toileting  - Record patient progress and toleration of activity level   Outcome: Progressing     Problem: DISCHARGE PLANNING  Goal: Discharge to home or other facility with appropriate resources  Description: INTERVENTIONS:  - Identify barriers to discharge w/patient and caregiver  - Arrange for needed discharge resources and transportation as appropriate  - Identify discharge learning needs (meds, wound care, etc.)  - Arrange for interpretive services to assist at discharge as needed  - Refer to Case Management Department for coordinating discharge planning if the patient needs post-hospital services based on physician/advanced practitioner order or complex needs related to functional status, cognitive ability, or social support system  Outcome: Progressing     Problem: Knowledge Deficit  Goal: Patient/family/caregiver demonstrates understanding of disease process, treatment plan, medications, and discharge instructions  Description: Complete learning assessment and assess knowledge base.   Interventions:  - Provide teaching at level of understanding  - Provide teaching via preferred learning methods  Outcome: Progressing

## 2023-07-13 NOTE — ANESTHESIA POSTPROCEDURE EVALUATION
Post-Op Assessment Note    CV Status:  Stable  Pain Score: 0    Pain management: adequate     Mental Status:  Alert and awake   Hydration Status:  Euvolemic   PONV Controlled:  Controlled   Airway Patency:  Patent      Post Op Vitals Reviewed: Yes      Staff: CRNA         No notable events documented.     BP   143/76   Temp  97.1   Pulse  84   Resp   18   SpO2 100

## 2023-07-13 NOTE — CONSULTS
Consultation - [address]  [facility]  Amina Boyce 62 y.o. female MRN: [id number]  Unit/Bed#: S -60 Encounter: [id number]        Inpatient consult to gastroenterology  Consult performed by: Courtney Kaufman PA-C  Consult ordered by: Any Crawley MD          Reason for Consult / Principal Problem: Epigastric pain, elevated liver enzymes, gallstones    HPI: Amina Boyce is a 62y.o. year old female with history of obesity, laryngeal tuberculosis who presented to the emergency room yesterday; she was recommended to go to ER after seeing GI Dr. Margi Torres in the office with symptoms of upper abdominal pain for the last few days. She had reported similar symptoms back in May for which she had presented to the emergency room, at that time had been noted with normal WBC and liver chemistries and lipase. However on this presentation she had noted darkening of the urine over the last 2 days. Her ultrasound here shows borderline ductal dilation with common bile duct 7 mm, as well as intraluminal gallbladder stones with sludge and gravel. Liver enzymes are elevated with a bilirubin of 6.05, alkaline phosphatase 165, , . CBC appears within the normal limits with no apparent leukocytosis or left shift, she also appears to have been afebrile and normotensive. Lipase was normal.    At this time the patient says she is feeling better than yesterday, having less pain. Urine is still dark. Denies fever/chills/pruruitis. She has never had EGD or colonoscopy before. Denies taking any blood thinners or having had problems with anesthesia. REVIEW OF SYSTEMS:    CONSTITUTIONAL: Denies any fever, chills, or rigors. Good appetite, and no recent weight loss. HEENT: No earache or tinnitus. Denies hearing loss or visual disturbances. CARDIOVASCULAR: No chest pain or palpitations. RESPIRATORY: Denies any cough, hemoptysis, shortness of breath or dyspnea on exertion.   GASTROINTESTINAL: As noted in the History of Present Illness. GENITOURINARY: No problems with urination. Denies any hematuria or dysuria. Dark urine x 2 days  NEUROLOGIC: No dizziness or vertigo, denies headaches. MUSCULOSKELETAL: Denies any muscle or joint pain. SKIN: Denies skin rashes or itching. ENDOCRINE: Denies excessive thirst. Denies intolerance to heat or cold. PSYCHOSOCIAL: Denies depression or anxiety. Denies any recent memory loss.        Historical Information   Past Medical History:   Diagnosis Date   • Adult-onset obesity    • Allergic rhinitis    • Disc displacement, cervical    • GERD (gastroesophageal reflux disease)    • Retinal disorders     last assessed 6/30/14   • Tuberculosis, laryngeal    • Vitamin D deficiency disease     last assessed 6/30/14     Past Surgical History:   Procedure Laterality Date   • BREAST CYST EXCISION Right     phyllodes tumor   • BREAST LUMPECTOMY Right 1/14/2020    phyllodes tumor   • BREAST LUMPECTOMY Right 2/25/2020    phyllodes tumor   • BREAST SURGERY     • EAR SURGERY Left     ear drum repair   • MAMMO NEEDLE LOCALIZATION RIGHT (ALL INC) Right 1/14/2020   •  [facility] INCL FLUOR GDNCE DX W/CELL WASHG SPX N/A 10/19/2018    Procedure: BRONCHOSCOPY RIGID;  Surgeon: Chandra Benavidez MD;  Location: [facility];  Service: ENT   •  [facility] EXC ANDERSON&/STRPG CORDS/EPIGL MCRSCP/TLSCP N/A 10/19/2018    Procedure: EXCISION LARYNGEAL SCAR; SUPRAGLOTTOPLASTY  POSSIBLE JET VENTILATION; CO2 LASER; COBLATOR;  Surgeon: Chandra Benavidez MD;  Location: [facility];  Service: ENT   • WI LARYNGOSCOPY DIRECT OPERATIVE W/BIOPSY N/A 10/19/2018    Procedure: MICRO DIRECT LARYNGOSCOPY;  Surgeon: Chandra Benavidez MD;  Location: [facility];  Service: ENT   • US GUIDED BREAST BIOPSY RIGHT COMPLETE Right 11/25/2019     Social History   Social History     Substance and Sexual Activity   Alcohol Use No     Social History     Substance and Sexual Activity   Drug Use No     Social History     Tobacco Use   Smoking Status Never   Smokeless Tobacco Never     Family History   Problem Relation Age of Onset   • Lung cancer Mother 76   • Liver cancer Brother    • No Known Problems Father    • No Known Problems Sister    • No Known Problems Daughter    • No Known Problems Sister    • No Known Problems Maternal Grandmother    • No Known Problems Maternal Grandfather    • No Known Problems Paternal Grandmother        Meds/Allergies     Medications Prior to Admission   Medication   • cetirizine (ZyrTEC) 10 mg tablet   • Ciprofloxacin HCl 0.2 % otic solution   • fluticasone (FLONASE) 50 mcg/act nasal spray   • hydrocortisone 1 % cream   • ondansetron (ZOFRAN-ODT) 4 mg disintegrating tablet   • pantoprazole (PROTONIX) 40 mg tablet     Current Facility-Administered Medications   Medication Dose Route Frequency   • acetaminophen (TYLENOL) tablet 650 mg  650 mg Oral Q6H [address]   • enoxaparin (LOVENOX) subcutaneous injection 40 mg  40 mg Subcutaneous Daily   • HYDROmorphone (DILAUDID) injection 0.5 mg  0.5 mg Intravenous Q4H PRN   • lactated ringers infusion  125 mL/hr Intravenous Continuous   • ondansetron (ZOFRAN) injection 4 mg  4 mg Intravenous Q6H PRN   • oxyCODONE (ROXICODONE) immediate release tablet 10 mg  10 mg Oral Q4H PRN   • oxyCODONE (ROXICODONE) IR tablet 5 mg  5 mg Oral Q4H PRN   • pantoprazole (PROTONIX) EC tablet 40 mg  40 mg Oral Daily       Allergies   Allergen Reactions   • Pollen Extract Allergic Rhinitis           Objective     Blood pressure 123/76, pulse 62, temperature 97.8 °F (36.6 °C), resp. rate 18, height 4' 11" (1.499 m), weight 81.6 kg (180 lb), SpO2 93 %, not currently breastfeeding.     No intake or output data in the 24 hours ending 07/13/23 0848      PHYSICAL EXAM     General Appearance:   Alert, jaundiced, cooperative, no distress, appears stated age    HEENT:   Normocephalic, atraumatic, anicteric.     Neck:  Supple, symmetrical, trachea midline, no adenopathy;    thyroid: no enlargement/tenderness/nodules; no carotid  bruit or JVD    Lungs:   Clear to auscultation bilaterally; no rales, rhonchi or wheezing; respirations unlabored    Heart[de-identified]   S1 and S2 normal; regular rate and rhythm; no murmur, rub, or gallop.    Abdomen:   Soft, non-tender, non-distended; normal bowel sounds; no masses, no organomegaly    Genitalia:   Deferred    Rectal:   Deferred    Extremities:  No cyanosis, clubbing or edema    Pulses:  2+ and symmetric all extremities    Skin:  Skin texture, turgor normal, no rashes or lesions    Lymph nodes:  No palpable cervical, axillary or inguinal lymphadenopathy        Lab Results:   Admission on 07/12/2023   Component Date Value   • WBC 07/12/2023 7.34    • RBC 07/12/2023 5.25 (H)    • Hemoglobin 07/12/2023 16.0 (H)    • Hematocrit 07/12/2023 48.1 (H)    • MCV 07/12/2023 92    • MCH 07/12/2023 30.5    • MCHC 07/12/2023 33.3    • RDW 07/12/2023 12.5    • MPV 07/12/2023 9.3    • Platelets 63/49/4495 285    • nRBC 07/12/2023 0    • Neutrophils Relative 07/12/2023 70    • Immat GRANS % 07/12/2023 0    • Lymphocytes Relative 07/12/2023 22    • Monocytes Relative 07/12/2023 6    • Eosinophils Relative 07/12/2023 1    • Basophils Relative 07/12/2023 1    • Neutrophils Absolute 07/12/2023 5.16    • Immature Grans Absolute 07/12/2023 0.03    • Lymphocytes Absolute 07/12/2023 1.58    • Monocytes Absolute 07/12/2023 0.45    • Eosinophils Absolute 07/12/2023 0.08    • Basophils Absolute 07/12/2023 0.04    • Sodium 07/12/2023 136    • Potassium 07/12/2023 4.2    • Chloride 07/12/2023 101    • CO2 07/12/2023 24    • ANION GAP 07/12/2023 11    • BUN 07/12/2023 13    • Creatinine 07/12/2023 1.18    • Glucose 07/12/2023 120    • Calcium 07/12/2023 9.9    • AST 07/12/2023 385 (H)    • ALT 07/12/2023 732 (H)    • Alkaline Phosphatase 07/12/2023 197 (H)    • Total Protein 07/12/2023 8.5 (H)    • Albumin 07/12/2023 4.6    • Total Bilirubin 07/12/2023 7.17 (H)    • eGFR 07/12/2023 50    • Lipase 07/12/2023 48    • Color, UA 07/12/2023 Dark Yellow    • Clarity, UA 07/12/2023 Turbid    • Specific Gravity, UA 07/12/2023 1.026    • pH, UA 07/12/2023 6.0    • Leukocytes, UA 07/12/2023 Large (A)    • Nitrite, UA 07/12/2023 Negative    • Protein, UA 07/12/2023 Trace (A)    • Glucose, UA 07/12/2023 Negative    • Ketones, UA 07/12/2023 20 (1+) (A)    • Urobilinogen, UA 07/12/2023 3.0 (A)    • Bilirubin, UA 07/12/2023 Moderate (A)    • Occult Blood, UA 07/12/2023 Large (A)    • Ventricular Rate 07/12/2023 87    • Atrial Rate 07/12/2023 87    • WV Interval 07/12/2023 156    • QRSD Interval 07/12/2023 78    • QT Interval 07/12/2023 336    • QTC Interval 07/12/2023 404    • P Axis 07/12/2023 52    • QRS Axis 07/12/2023 29    • T Wave Axis 07/12/2023 -9    • RBC, UA 07/12/2023 4-10 (A)    • WBC, UA 07/12/2023 30-50 (A)    • Epithelial Cells 07/12/2023 Occasional    • Bacteria, UA 07/12/2023 Occasional    • MUCUS THREADS 07/12/2023 Occasional (A)    • Bilirubin, Direct 07/12/2023 4.17 (H)    • Sodium 07/13/2023 137    • Potassium 07/13/2023 3.7    • Chloride 07/13/2023 104    • CO2 07/13/2023 22    • ANION GAP 07/13/2023 11    • BUN 07/13/2023 14    • Creatinine 07/13/2023 0.78    • Glucose 07/13/2023 90    • Calcium 07/13/2023 9.2    • AST 07/13/2023 227 (H)    • ALT 07/13/2023 552 (H)    • Alkaline Phosphatase 07/13/2023 165 (H)    • Total Protein 07/13/2023 7.3    • Albumin 07/13/2023 4.0    • Total Bilirubin 07/13/2023 6.05 (H)    • eGFR 07/13/2023 84    • WBC 07/13/2023 6.72    • RBC 07/13/2023 4.78    • Hemoglobin 07/13/2023 14.3    • Hematocrit 07/13/2023 43.5    • MCV 07/13/2023 91    • MCH 07/13/2023 29.9    • MCHC 07/13/2023 32.9    • RDW 07/13/2023 12.6    • Platelets 17/09/9291 250    • MPV 07/13/2023 9.3        Imaging Studies: I have personally reviewed pertinent reports.       RIGHT UPPER QUADRANT ULTRASOUND     INDICATION:     elevated LFT's, elevated Tbili, jaundice, RUQ/epigastric pain.     COMPARISON: 5/23/2023     TECHNIQUE: Real-time ultrasound of the right upper quadrant was performed with a curvilinear transducer with both volumetric sweeps and still imaging techniques.     FINDINGS:     PANCREAS: The pancreas is partially obscured by bowel gas.     AORTA AND IVC: The aorta and IVC are partially secured by bowel gas.     LIVER:  Size:  Within normal range. The liver measures 13.9 cm in the midclavicular line. Contour:  Surface contour is smooth. Parenchyma: There is moderate diffuse increased echogenicity with smooth echotexture and acoustic beam attenuation. Most consistent with moderate hepatic steatosis. Some areas of sparing are appreciated. Some heterogeneity is probably related to body   habitus/artifacts. No liver mass identified. 4 mm hepatic cyst is seen. 5 mm hepatic cyst is seen. 1.5 cm cyst is seen with a thin septation  Limited imaging of the main portal vein shows it to be patent and hepatopetal.     BILIARY:  Shadowing gallstone is identified. There is gallbladder distention. The gallbladder wall measures 2 mm. There is fairly echogenic debris filling most of the the gallbladder lumen. No abnormality was noted on the CT on May 23 suggesting this represents   sludge and gravel rather than a mass. Some color flow signal is seen with more twinkle artifacts suggesting this may be related to motion and calcification rather than vascular flow. No enhancement was seen on the prior CT. No sonographic Montero sign or   pericholecystic fluid. No intrahepatic biliary dilatation. CBD measures 7.0 mm. Borderline in caliber. Previously the duct measured 4 mm. No choledocholithiasis.     KIDNEY:  Right kidney measures 9.9 x 4.3 x 4.4 cm. Volume 97.7 mL  Kidney within normal limits.     ASCITES:   None.     IMPRESSION:     Gallbladder demonstrates stones as well as extensive echogenic sludge and gravel with twinkle artifacts.  No abnormality was seen in the gallbladder lumen on the study of May 23 to suggest any polyp.     No wall thickening or sonographic Bonnielee Jetty sign is noted. These findings should be correlated clinically for any evidence of acute gallbladder inflammation. Nuclear medicine hepatobiliary study may be useful to assess for cystic duct obstruction.     The common duct is borderline at 7 mm given the elevated bilirubin choledocholithiasis is a consideration in the duct caliber appears to have increased since the prior CT. MRCP may be useful. ASSESSMENT/PLAN:     #1.  Epigastric pain with gallstones, biliary ductal dilatation and elevated liver enzymes in obstructive pattern; appears to be secondary to obstructing choledocholithiasis. No evidence of cholangitis currently, no evidence of pancreatitis    -We will plan for ERCP    -Patient was advised regarding ERCP procedure and associated risks and benefits, risks including but not limited to infection, perforation and bleeding, pancreatitis    -Keep patient n.p.o. until procedure    -Monitor abdominal exam, temperature, white blood cell count    -[place] and timing of cholecystectomy as per surgical service      The patient was seen and examined by Dr. Ulysses Huguenin, all key medical decisions were made with Dr. Ulysses Huguenin. Thank you for allowing us to participate in the care of this pleasant patient. We will follow up with you closely.

## 2023-07-13 NOTE — UTILIZATION REVIEW
Initial Clinical Review    Admission: Date/Time/Statement:   Admission Orders (From admission, onward)     Ordered        07/12/23 2151  Place in Observation  Once                      Orders Placed This Encounter   Procedures   • Place in Observation     Standing Status:   Standing     Number of Occurrences:   1     Order Specific Question:   Level of Care     Answer:   Med Surg [16]     ED Arrival Information     Expected   -    Arrival   7/12/2023 17:03    Acuity   Urgent            Means of arrival   Walk-In    Escorted by   Spouse    Service   Surgery-General    Admission type   Emergency            Arrival complaint   ABDOMINAL PAIN            Chief Complaint   Patient presents with   • Abdominal Pain      reports "same thing that she was here for the last 6 weeks, was seen at Dr today and was told to go to ER for further testing"       Initial Presentation: 62 y.o. female PMH of HLD, GERD who presents to ED from GI office with  3 days of acutely worsening abdominal pain- epigastric and right upper quadrant regions, associated nausea. On exam, pain constant, had small bm today, urine dark , pt reports feeling bloated with abdomen soft, minimally distended, tender TTP in epigastric and RUQ. Scleral icterus Labs-elevated T bili,elevated LFT's . LFT's . UA- large leuks,  moderate bilirubinur. US RUQ shows sludge and stones . Pt given IVF, IV analgesic in ED. PT admitted as OBS by [facility] sx service with abdominal pain, cholelithiasis, probable choledocholithiasis, hyperbilirubinemia. . Plan - IVF, NPO after MN, GI consult, MRCP vs ERCP. Pain control. Lovenox, PPI . Will need lap zacarias once common bile duct has been determined to be clear. Date: 7/13  general sx- WBC stable, T bili down to 6.05 today, D bili 4.17 . Pt will need ERCP, await GI consult. Pt NPO. Reports improving abdominal pain, no n/v, drank some water before bed, passing flatus. Abdomen minimally distended, TTP RUQ and epigastric .  IVF infusing . GI consult- Epigastric pain, elevated liver enzymes in obstructive pattern  , gallstones, obstructing choledocholithiasis . US RUQ shows borderline ductal dilation with common bile duct 7 mm, as well as intraluminal gallbladder stones with sludge and gravel. Liver enzymes are elevated with a bilirubin of 6.05, alkaline phosphatase 165, , . Urine is still dark. Plan- ERCP, NPO, monitor abdominal exams, temp, WBC's . [place] and timing of cholecystectomy as per surgical service    Date 7/14-   General sx-Obstructive jaundice and symptomatic choledocholithiasis . Pt had ERCP w/ sphincterotomy  7/13, extraction sludge and stone fragments. Reports some throat soreness s/p  Procedure yesaterday , LFT's and T bili down trending . Pt NPO for lap possible open cholecystectomy today. IVF infusing. Abdomen  soft, minimally distended, non tender.  Monitor LFT's , WBC .   7/14/23 @ 1505  CHOLECYSTECTOMY LAPAROSCOPIC   General anesthesia   Operative Findings:Cholelithiasis    ED Triage Vitals [07/12/23 1708]   Temperature Pulse Respirations Blood Pressure SpO2   98 °F (36.7 °C) 83 16 134/84 95 %      Temp Source Heart Rate Source Patient Position - Orthostatic VS BP Location FiO2 (%)   Oral Monitor Sitting Right arm --      Pain Score       10 - Worst Possible Pain          Wt Readings from Last 1 Encounters:   07/13/23 81.6 kg (180 lb)     Additional Vital Signs:   Date/Time Temp Pulse Resp BP MAP (mmHg) SpO2 O2 Flow Rate (L/min) O2 Device   07/14/23 07:58:21 98 °F (36.7 °C) 80 16 110/93 99 94 % -- --   07/14/23 0000 -- -- -- -- -- -- -- None (Room air)   07/13/23 22:27:44 98.4 °F (36.9 °C) 79 -- 146/83 104 96 % -- --   07/13/23 1745 -- 86 22 110/80 92 96 % -- None (Room air)   07/13/23 1730 -- 82 12 145/82 107 99 % -- None (Room air)   07/13/23 1728 97.1 °F (36.2 °C) Abnormal  84 15 143/76 103 99 % 4 L/min Simple mask   07/13/23 1548 96 °F (35.6 °C) Abnormal  71 20 143/85 -- 99 % -- None (Room air) 07/13/23 15:07:02 98 °F (36.7 °C) 61 -- 138/81 100 95 % --      Date/Time Temp Pulse Resp BP MAP (mmHg) SpO2   07/13/23 06:51:05 97.8 °F (36.6 °C) 62 18 123/76 92 93 %   07/13/23 0000 -- -- -- -- -- --   07/12/23 23:31:20 98.6 °F (37 °C) 81 -- 145/98 114 95 %   07/12/23 23:31:04 98.6 °F (37 °C) 82 -- 145/98 114 95 %   07/12/23 2223 -- 75 16 135/72 97 96 %   07/12/23 2130 -- 80 -- 143/70 98 96 %   07/12/23 2100 -- 77 -- 142/86 108 96 %   07/12/23 2015 -- 75 -- 139/74 99 95 %   07/12/23 2003 -- 76 16 132/78 99 95 %     Pertinent Labs/Diagnostic Test Results:     US right upper quadrant   Final Result by Geovanna Palmer MD (07/12 2012)      Gallbladder demonstrates stones as well as extensive echogenic sludge and gravel with twinkle artifacts. No abnormality was seen in the gallbladder lumen on the study of May 23 to suggest any polyp. No wall thickening or sonographic Napoleon Edison sign is noted. These findings should be correlated clinically for any evidence of acute gallbladder inflammation. Nuclear medicine hepatobiliary study may be useful to assess for cystic duct obstruction. The common duct is borderline at 7 mm given the elevated bilirubin choledocholithiasis is a consideration in the duct caliber appears to have increased since the prior CT. MRCP may be useful. The study was marked in [facility] for immediate notification. Workstation performed: [id number]         FL ERCP biliary only    (Results Pending)   7/13/23 ERCP-  FINDINGS:  The common bile duct was deeply cannulated after 1 attempt using a traction sphincterotome. Cannulation was not difficult and no bleeding was observed. Contrast was injected. Cholangiogram showed common bile duct measuring about 9 to 10 mm with small filling defect distally.   Sphincterotomy was performed and the duct was swept with 9 to 12 mm balloon catheter with extraction of stone fragments and sludge (during the sphincterotomy the cutting wire broke suddenly with quick sphincterotomy)          Results from last 7 days   Lab Units 07/14/23  0436 07/13/23  0509 07/12/23  1714   WBC Thousand/uL 6.28 6.72 7.34   HEMOGLOBIN g/dL 12.8 14.3 16.0*   HEMATOCRIT % 39.0 43.5 48.1*   PLATELETS Thousands/uL 239 250 285   NEUTROS ABS Thousands/µL 3.89  --  5.16         Results from last 7 days   Lab Units 07/14/23  0436 07/13/23  0509 07/12/23  1714   SODIUM mmol/L 137 137 136   POTASSIUM mmol/L 3.8 3.7 4.2   CHLORIDE mmol/L 106 104 101   CO2 mmol/L 24 22 24   ANION GAP mmol/L 7 11 11   BUN mg/dL 10 14 13   CREATININE mg/dL 0.63 0.78 1.18   EGFR ml/min/1.73sq m 99 84 50   CALCIUM mg/dL 9.0 9.2 9.9     Results from last 7 days   Lab Units 07/14/23  0436 07/13/23  0509 07/12/23  2140 07/12/23  1714   AST U/L 97* 227*  --  385*   ALT U/L 347* 552*  --  732*   ALK PHOS U/L 146* 165*  --  197*   TOTAL PROTEIN g/dL 6.8 7.3  --  8.5*   ALBUMIN g/dL 3.7 4.0  --  4.6   TOTAL BILIRUBIN mg/dL 2.48* 6.05*  --  7.17*   BILIRUBIN DIRECT mg/dL  --   --  4.17*  --      Results from last 7 days   Lab Units 07/13/23  1812   POC GLUCOSE mg/dl 108     Results from last 7 days   Lab Units 07/14/23  0436 07/13/23  0509 07/12/23  1714   GLUCOSE RANDOM mg/dL 77 90 120             No results found for: "BETA-HYDROXYBUTYRATE"                                                                   Results from last 7 days   Lab Units 07/12/23 1919   HEP B S AG  Non-reactive   HEP C AB  Non-reactive   HEP B C IGM  Reactive*   HEP B C TOTAL AB  Reactive*     Results from last 7 days   Lab Units 07/12/23  1714   LIPASE u/L 48                 Results from last 7 days   Lab Units 07/12/23  1934   CLARITY UA  Turbid   COLOR UA  Dark Yellow   SPEC GRAV UA  1.026   PH UA  6.0   GLUCOSE UA mg/dl Negative   KETONES UA mg/dl 20 (1+)*   BLOOD UA  Large*   PROTEIN UA mg/dl Trace*   NITRITE UA  Negative   BILIRUBIN UA  Moderate*   UROBILINOGEN UA (BE) mg/dl 3.0*   LEUKOCYTES UA  Large*   WBC UA /hpf 30-50*   RBC UA /hpf 4-10*   BACTERIA UA /hpf Occasional   EPITHELIAL CELLS WET PREP /hpf Occasional   MUCUS THREADS  Occasional*                                 Results from last 7 days   Lab Units 07/12/23  1934   URINE CULTURE  Culture too young- will reincubate                   ED Treatment:   Medication Administration from 07/12/2023 1703 to 07/12/2023 2319       Date/Time Order Dose Route Action     07/12/2023 1947 EDT HYDROmorphone HCl (DILAUDID) injection 0.2 mg 0.2 mg Intravenous Given     07/12/2023 2215 EDT lactated ringers infusion 125 mL/hr Intravenous New Bag        Past Medical History:   Diagnosis Date   • Adult-onset obesity    • Allergic rhinitis    • Disc displacement, cervical    • GERD (gastroesophageal reflux disease)    • Retinal disorders     last assessed 6/30/14   • Tuberculosis, laryngeal    • Vitamin D deficiency disease     last assessed 6/30/14     Present on Admission:  **None**      Admitting Diagnosis: Cholelithiasis [K80.20]  Epigastric abdominal pain [R10.13]  Abdominal pain [R10.9]  Elevated LFTs [R79.89]  Gallbladder sludge [K82.8]  Total bilirubin, elevated [R17]  Age/Sex: 62 y.o. female  Admission Orders:  Scheduled Medications:  acetaminophen, 650 mg, Oral, Q6H SALLIE  enoxaparin, 40 mg, Subcutaneous, Daily  pantoprazole, 40 mg, Oral, Daily    potassium chloride (K-DUR,KLOR-CON) CR tablet 20 mEq  Dose: 20 mEq  Freq:  Once Route: PO  Start: 07/13/23 0945 End: 07/13/23 1145  Continuous IV Infusions:  lactated ringers, 125 mL/hr, Intravenous, Continuous      PRN Meds:  HYDROmorphone, 0.5 mg, Intravenous, Q4H PRN x1 7/13  ondansetron, 4 mg, Intravenous, Q6H PRN  oxyCODONE, 10 mg, Oral, Q4H PRN  oxyCODONE, 5 mg, Oral, Q4H PRN  indomethacin (INDOCIN) rectal suppository  Freq: As needed  Start: 07/13/23 1644 End: 07/13/23 1644        NPO 7/13 !0001    IP CONSULT TO GASTROENTEROLOGY    Network Utilization Review Department  ATTENTION: Please call with any questions or concerns to [phone] and carefully listen to the prompts so that you are directed to the right person. All voicemails are confidential.  Desiree Holcomb all requests for admission clinical reviews, approved or denied determinations and any other requests to dedicated fax number below belonging to the campus where the patient is receiving treatment.  List of dedicated fax numbers for the Facilities:  [place] DENIALS (Administrative/Medical Necessity) [phone] [address] [facility] (Maternity/NICU/Pediatrics) [phone]   [address] [place] [address] Drive [phone]   [facility] [address] [facility] [phone]   [address] [place] [address] [id number] [place] [address] [phone]   [address] [address] [phone]

## 2023-07-13 NOTE — H&P
H&P Exam - General Surgery   Bruce Reyes 62 y.o. female MRN: [id number]  Unit/Bed#: ED-07 Encounter: [id number]    Assessment/Plan     Assessment:  58yoF p/w epigastric and RUQ abdominal pain, stones and sludge seen on ultrasound, elevated total bilirubin    7/12 RUQ US: GB stones and extensive echogenic sludge and gravel, no wall thickening, CBD 7 mm previously 4 mm, no choledocholithiasis seen, no intrahepatic biliary dilation    Vital stable, afebrile  WBC 7.34  Creatinine 1.18  T. bili 7.17  AST//732  Alk phos 197  Lipase 48    Plan:  -Admit to general surgery service  -Clears, n.p.o. at midnight  -GI consult for possible choledocholithiasis and need for ERCP  -MRCP pending  -[email or web address]  -Pain control  -Lovenox  -Pantoprazole    History of Present Illness   HPI:  Bruce Reyes is a 62 y.o. female w PMH of HLD, GERD, presenting [facility] for 3 days of acutely worsening abdominal pain localized to the epigastric and right upper quadrant regions, associated nausea, found to have stones and sludge on ultrasound as well as elevated total bilirubin. She had been seen in the gastroenterology office earlier today who advised her to present to the ED for further work-up and management. General surgery contacted for further work-up and management. Interviewed patient along with her wife in the room, states she has epigastric and right upper quadrant abdominal pain dating back to May 2023 when she presented to the ED but no abnormalities were found on imaging or lab work.   States she was fine after that time until the last week or so she had return of abdominal pain localized to the epigastric and right upper quadrant regions, is not able to exactly characterize the pain, states the pain comes and goes unreliably and cannot specifically associated with anything in particular including food etc.  She has been eating less recently as she is trying to lose weight but she has had even decreased p.o. intake in the last 3 days secondary to pain and nausea, denies any true episodes of emesis. Nothing is able to relieve the pain, constant and worsening during this time. She reports she had a small bowel movement today, smaller in caliber than normal.  Reports her urine being dark. Does feel more bloated than usual.  Denies any history of abdominal surgeries. Patient does have history of GERD and states that this pain is different from her pain with reflux. Denies fevers/chills. Review of Systems   Constitutional: Positive for appetite change. Negative for chills and fever. Respiratory: Negative for shortness of breath. Cardiovascular: Negative for chest pain. Gastrointestinal: Positive for abdominal distention, abdominal pain and nausea. Negative for constipation, diarrhea and vomiting. Genitourinary: Negative for difficulty urinating. Musculoskeletal: Negative for back pain and neck pain. Neurological: Positive for dizziness. Negative for weakness, light-headedness and headaches. Psychiatric/Behavioral: Negative for agitation and confusion. The patient is not nervous/anxious. All other systems reviewed and are negative.       Historical Information   Past Medical History:   Diagnosis Date   • Adult-onset obesity    • Allergic rhinitis    • Disc displacement, cervical    • GERD (gastroesophageal reflux disease)    • Retinal disorders     last assessed 6/30/14   • Tuberculosis, laryngeal    • Vitamin D deficiency disease     last assessed 6/30/14     Past Surgical History:   Procedure Laterality Date   • BREAST CYST EXCISION Right     phyllodes tumor   • BREAST LUMPECTOMY Right 1/14/2020    phyllodes tumor   • BREAST LUMPECTOMY Right 2/25/2020    phyllodes tumor   • BREAST SURGERY     • EAR SURGERY Left     ear drum repair   • MAMMO NEEDLE LOCALIZATION RIGHT (ALL INC) Right 1/14/2020   •  [facility] INCL FLUOR GDNCE DX W/CELL WASHG SPX N/A 10/19/2018    Procedure: BRONCHOSCOPY RIGID;  Surgeon: Dick Adkins Ann Marie Braxton MD;  Location: [facility];  Service: ENT   •  [place] EXC ANDERSON&/STRPG CORDS/EPIGL MCRSCP/TLSCP N/A 10/19/2018    Procedure: EXCISION LARYNGEAL SCAR; SUPRAGLOTTOPLASTY  POSSIBLE JET VENTILATION; CO2 LASER; COBLATOR;  Surgeon: Chandra Benavidez MD;  Location: [facility];  Service: ENT   • [place] LARYNGOSCOPY DIRECT OPERATIVE W/BIOPSY N/A 10/19/2018    Procedure: MICRO DIRECT LARYNGOSCOPY;  Surgeon: Chandra Benavidez MD;  Location: [facility];  Service: ENT   • US GUIDED BREAST BIOPSY RIGHT COMPLETE Right 11/25/2019     Social History   Social History     Substance and Sexual Activity   Alcohol Use No     Social History     Substance and Sexual Activity   Drug Use No     Social History     Tobacco Use   Smoking Status Never   Smokeless Tobacco Never     E-Cigarette/Vaping   • E-Cigarette Use Never User      E-Cigarette/Vaping Substances     Family History: non-contributory    Meds/Allergies   all medications and allergies reviewed  Allergies   Allergen Reactions   • Pollen Extract Allergic Rhinitis       Objective   First Vitals:   Blood Pressure: 134/84 (07/12/23 1708)  Pulse: 83 (07/12/23 1708)  Temperature: 98 °F (36.7 °C) (07/12/23 1708)  Temp Source: Oral (07/12/23 1708)  Respirations: 16 (07/12/23 1708)  Height: 4' 11" (149.9 cm) (07/12/23 1708)  Weight - Scale: 81.6 kg (180 lb) (07/12/23 1708)  SpO2: 95 % (07/12/23 1708)    Current Vitals:   Blood Pressure: 143/70 (07/12/23 2130)  Pulse: 80 (07/12/23 2130)  Temperature: 98 °F (36.7 °C) (07/12/23 1708)  Temp Source: Oral (07/12/23 1708)  Respirations: 16 (07/12/23 2003)  Height: 4' 11" (149.9 cm) (07/12/23 1708)  Weight - Scale: 81.6 kg (180 lb) (07/12/23 1708)  SpO2: 96 % (07/12/23 2130)    No intake or output data in the 24 hours ending 07/12/23 2153    Invasive Devices     Peripheral Intravenous Line  Duration           Peripheral IV 07/12/23 Right Antecubital <1 day                Physical Exam  Vitals reviewed.    Constitutional:       General: She is not in acute distress. Appearance: Normal appearance. She is not ill-appearing or toxic-appearing. HENT:      Head: Normocephalic and atraumatic. Nose: Nose normal.      Mouth/Throat:      Mouth: Mucous membranes are moist.   Eyes:      General: Scleral icterus present. Extraocular Movements: Extraocular movements intact. Cardiovascular:      Rate and Rhythm: Normal rate and regular rhythm. Pulses: Normal pulses. Pulmonary:      Effort: Pulmonary effort is normal. No respiratory distress. Abdominal:      General: There is distension. Palpations: Abdomen is soft. Tenderness: There is abdominal tenderness. There is no guarding or rebound. Comments: Soft, minimally distended, minimally TTP in epigastric and RUQ regions   Musculoskeletal:         General: Normal range of motion. Cervical back: Normal range of motion. Skin:     General: Skin is warm. Capillary Refill: Capillary refill takes less than 2 seconds. Coloration: Skin is not jaundiced or pale. Findings: No erythema. Neurological:      Mental Status: She is alert and oriented to person, place, and time. Psychiatric:         Mood and Affect: Mood normal.         Lab Results:   I have personally reviewed pertinent lab results.   , CBC:   Lab Results   Component Value Date    WBC 7.34 07/12/2023    HGB 16.0 (H) 07/12/2023    HCT 48.1 (H) 07/12/2023    MCV 92 07/12/2023     07/12/2023    RBC 5.25 (H) 07/12/2023    MCH 30.5 07/12/2023    MCHC 33.3 07/12/2023    RDW 12.5 07/12/2023    MPV 9.3 07/12/2023    NRBC 0 07/12/2023   , CMP:   Lab Results   Component Value Date    SODIUM 136 07/12/2023    K 4.2 07/12/2023     07/12/2023    CO2 24 07/12/2023    BUN 13 07/12/2023    CREATININE 1.18 07/12/2023    CALCIUM 9.9 07/12/2023     (H) 07/12/2023     (H) 07/12/2023    ALKPHOS 197 (H) 07/12/2023    EGFR 50 07/12/2023   , Urinalysis:   Lab Results   Component Value Date    Randolphjoce Preciado Yellow 07/12/2023    CLARITYU Turbid 07/12/2023    SPECGRAV 1.026 07/12/2023    PHUR 6.0 07/12/2023    LEUKOCYTESUR Large (A) 07/12/2023    NITRITE Negative 07/12/2023    GLUCOSEU Negative 07/12/2023    KETONESU 20 (1+) (A) 07/12/2023    BILIRUBINUR Moderate (A) 07/12/2023    BLOODU Large (A) 07/12/2023   , Lipase:   Lab Results   Component Value Date    LIPASE 48 07/12/2023     Imaging: I have personally reviewed pertinent reports. EKG, Pathology, and Other Studies: I have personally reviewed pertinent reports.       Code Status: Level 1 - Full Code  Advance Directive and Living Will:      Power of :    POLST:

## 2023-07-13 NOTE — PROGRESS NOTES
Progress Note - General Surgery   Kendra Oconnor 62 y.o. female MRN: [id number]  Unit/Bed#: S -01 Encounter: [id number]    Assessment:  56yoF p/w epigastric and RUQ abdominal pain, stones and sludge seen on ultrasound, elevated total bilirubin     Vital stable, afebrile  WBC 6.72 from 7.34  Creatinine 0.78  T. Bili 6.05 from 7.17  D bili 4.17    Plan:  -NPO  -given t bili elevation will likely need ERCP, appreciate GI c/s  -Nita@[employer]  -Pain control  -Lovenox  -Pantoprazole  -encourage ambulation    Subjective/Objective   Subjective:   Reports improving abdominal pain, no n/v, drank some water before bed, passing flatus, voiding. Objective:     Blood pressure 123/76, pulse 62, temperature 97.8 °F (36.6 °C), resp. rate 18, height 4' 11" (1.499 m), weight 81.6 kg (180 lb), SpO2 93 %, not currently breastfeeding. ,Body mass index is 36.36 kg/m². No intake or output data in the 24 hours ending 07/13/23 0940    Invasive Devices     Peripheral Intravenous Line  Duration           Peripheral IV 07/13/23 Dorsal (posterior); Right Hand <1 day                Physical Exam:   General: NAD  Skin: Warm, dry, anicteric  HEENT: Normocephalic, atraumatic, scleral icterus  CV: RRR, no m/r/g  Pulm: CTA b/l, no inc WOB  Abd: Soft, minimally distended, TTP in RUQ and epigastric  MSK: Symmetric, no edema, no tenderness, no deformity  Neuro: AOx3, GCS 15    Lab, Imaging and other studies:  I have personally reviewed pertinent lab results.   , CBC:   Lab Results   Component Value Date    WBC 6.72 07/13/2023    HGB 14.3 07/13/2023    HCT 43.5 07/13/2023    MCV 91 07/13/2023     07/13/2023    RBC 4.78 07/13/2023    MCH 29.9 07/13/2023    MCHC 32.9 07/13/2023    RDW 12.6 07/13/2023    MPV 9.3 07/13/2023    NRBC 0 07/12/2023   , CMP:   Lab Results   Component Value Date    SODIUM 137 07/13/2023    K 3.7 07/13/2023     07/13/2023    CO2 22 07/13/2023    BUN 14 07/13/2023    CREATININE 0.78 07/13/2023    CALCIUM 9.2 07/13/2023     (H) 07/13/2023     (H) 07/13/2023    ALKPHOS 165 (H) 07/13/2023    EGFR 84 07/13/2023     VTE Pharmacologic Prophylaxis: Sequential compression device (Venodyne)  and Enoxaparin (Lovenox)  VTE Mechanical Prophylaxis: sequential compression device

## 2023-07-14 ENCOUNTER — ANESTHESIA EVENT (OUTPATIENT)
Dept: PERIOP | Facility: HOSPITAL | Age: 58
DRG: 419 | End: 2023-07-14
Payer: COMMERCIAL

## 2023-07-14 ENCOUNTER — ANESTHESIA (OUTPATIENT)
Dept: PERIOP | Facility: HOSPITAL | Age: 58
DRG: 419 | End: 2023-07-14
Payer: COMMERCIAL

## 2023-07-14 PROBLEM — K80.50 CHOLEDOCHOLITHIASIS: Status: ACTIVE | Noted: 2023-07-14

## 2023-07-14 PROBLEM — R06.09 DYSPNEA ON EXERTION: Status: RESOLVED | Noted: 2019-03-03 | Resolved: 2023-07-14

## 2023-07-14 LAB
ALBUMIN SERPL BCP-MCNC: 3.7 G/DL (ref 3.5–5)
ALP SERPL-CCNC: 146 U/L (ref 34–104)
ALT SERPL W P-5'-P-CCNC: 347 U/L (ref 7–52)
ANION GAP SERPL CALCULATED.3IONS-SCNC: 7 MMOL/L
AST SERPL W P-5'-P-CCNC: 97 U/L (ref 13–39)
BACTERIA UR CULT: NORMAL
BASOPHILS # BLD AUTO: 0.04 THOUSANDS/ÂΜL (ref 0–0.1)
BASOPHILS NFR BLD AUTO: 1 % (ref 0–1)
BILIRUB SERPL-MCNC: 2.48 MG/DL (ref 0.2–1)
BUN SERPL-MCNC: 10 MG/DL (ref 5–25)
CALCIUM SERPL-MCNC: 9 MG/DL (ref 8.4–10.2)
CHLORIDE SERPL-SCNC: 106 MMOL/L (ref 96–108)
CO2 SERPL-SCNC: 24 MMOL/L (ref 21–32)
CREAT SERPL-MCNC: 0.63 MG/DL (ref 0.6–1.3)
EOSINOPHIL # BLD AUTO: 0.16 THOUSAND/ÂΜL (ref 0–0.61)
EOSINOPHIL NFR BLD AUTO: 3 % (ref 0–6)
ERYTHROCYTE [DISTWIDTH] IN BLOOD BY AUTOMATED COUNT: 12.6 % (ref 11.6–15.1)
GFR SERPL CREATININE-BSD FRML MDRD: 99 ML/MIN/1.73SQ M
GLUCOSE P FAST SERPL-MCNC: 77 MG/DL (ref 65–99)
GLUCOSE SERPL-MCNC: 77 MG/DL (ref 65–140)
HCT VFR BLD AUTO: 39 % (ref 34.8–46.1)
HGB BLD-MCNC: 12.8 G/DL (ref 11.5–15.4)
IMM GRANULOCYTES # BLD AUTO: 0.03 THOUSAND/UL (ref 0–0.2)
IMM GRANULOCYTES NFR BLD AUTO: 1 % (ref 0–2)
LYMPHOCYTES # BLD AUTO: 1.62 THOUSANDS/ÂΜL (ref 0.6–4.47)
LYMPHOCYTES NFR BLD AUTO: 26 % (ref 14–44)
MCH RBC QN AUTO: 30 PG (ref 26.8–34.3)
MCHC RBC AUTO-ENTMCNC: 32.8 G/DL (ref 31.4–37.4)
MCV RBC AUTO: 91 FL (ref 82–98)
MONOCYTES # BLD AUTO: 0.54 THOUSAND/ÂΜL (ref 0.17–1.22)
MONOCYTES NFR BLD AUTO: 9 % (ref 4–12)
NEUTROPHILS # BLD AUTO: 3.89 THOUSANDS/ÂΜL (ref 1.85–7.62)
NEUTS SEG NFR BLD AUTO: 60 % (ref 43–75)
NRBC BLD AUTO-RTO: 0 /100 WBCS
PLATELET # BLD AUTO: 239 THOUSANDS/UL (ref 149–390)
PMV BLD AUTO: 9.6 FL (ref 8.9–12.7)
POTASSIUM SERPL-SCNC: 3.8 MMOL/L (ref 3.5–5.3)
PROT SERPL-MCNC: 6.8 G/DL (ref 6.4–8.4)
RBC # BLD AUTO: 4.27 MILLION/UL (ref 3.81–5.12)
SODIUM SERPL-SCNC: 137 MMOL/L (ref 135–147)
WBC # BLD AUTO: 6.28 THOUSAND/UL (ref 4.31–10.16)

## 2023-07-14 PROCEDURE — 80053 COMPREHEN METABOLIC PANEL: CPT | Performed by: INTERNAL MEDICINE

## 2023-07-14 PROCEDURE — 99232 SBSQ HOSP IP/OBS MODERATE 35: CPT | Performed by: PHYSICIAN ASSISTANT

## 2023-07-14 PROCEDURE — 88304 TISSUE EXAM BY PATHOLOGIST: CPT | Performed by: STUDENT IN AN ORGANIZED HEALTH CARE EDUCATION/TRAINING PROGRAM

## 2023-07-14 PROCEDURE — 47562 LAPAROSCOPIC CHOLECYSTECTOMY: CPT | Performed by: SURGERY

## 2023-07-14 PROCEDURE — 85025 COMPLETE CBC W/AUTO DIFF WBC: CPT | Performed by: INTERNAL MEDICINE

## 2023-07-14 PROCEDURE — 0FT44ZZ RESECTION OF GALLBLADDER, PERCUTANEOUS ENDOSCOPIC APPROACH: ICD-10-PCS | Performed by: SURGERY

## 2023-07-14 PROCEDURE — 99232 SBSQ HOSP IP/OBS MODERATE 35: CPT | Performed by: SURGERY

## 2023-07-14 RX ORDER — KETOROLAC TROMETHAMINE 30 MG/ML
INJECTION, SOLUTION INTRAMUSCULAR; INTRAVENOUS AS NEEDED
Status: DISCONTINUED | OUTPATIENT
Start: 2023-07-14 | End: 2023-07-14

## 2023-07-14 RX ORDER — MAGNESIUM HYDROXIDE 1200 MG/15ML
LIQUID ORAL AS NEEDED
Status: DISCONTINUED | OUTPATIENT
Start: 2023-07-14 | End: 2023-07-14 | Stop reason: HOSPADM

## 2023-07-14 RX ORDER — DEXAMETHASONE SODIUM PHOSPHATE 10 MG/ML
INJECTION, SOLUTION INTRAMUSCULAR; INTRAVENOUS AS NEEDED
Status: DISCONTINUED | OUTPATIENT
Start: 2023-07-14 | End: 2023-07-14

## 2023-07-14 RX ORDER — LIDOCAINE HYDROCHLORIDE 10 MG/ML
INJECTION, SOLUTION EPIDURAL; INFILTRATION; INTRACAUDAL; PERINEURAL AS NEEDED
Status: DISCONTINUED | OUTPATIENT
Start: 2023-07-14 | End: 2023-07-14

## 2023-07-14 RX ORDER — MIDAZOLAM HYDROCHLORIDE 2 MG/2ML
INJECTION, SOLUTION INTRAMUSCULAR; INTRAVENOUS AS NEEDED
Status: DISCONTINUED | OUTPATIENT
Start: 2023-07-14 | End: 2023-07-14

## 2023-07-14 RX ORDER — ROCURONIUM BROMIDE 10 MG/ML
INJECTION, SOLUTION INTRAVENOUS AS NEEDED
Status: DISCONTINUED | OUTPATIENT
Start: 2023-07-14 | End: 2023-07-14

## 2023-07-14 RX ORDER — OXYCODONE HYDROCHLORIDE 5 MG/1
5 TABLET ORAL EVERY 4 HOURS PRN
Qty: 6 TABLET | Refills: 0 | Status: SHIPPED | OUTPATIENT
Start: 2023-07-14

## 2023-07-14 RX ORDER — CEFAZOLIN SODIUM 1 G/3ML
INJECTION, POWDER, FOR SOLUTION INTRAMUSCULAR; INTRAVENOUS AS NEEDED
Status: DISCONTINUED | OUTPATIENT
Start: 2023-07-14 | End: 2023-07-14

## 2023-07-14 RX ORDER — ONDANSETRON 2 MG/ML
INJECTION INTRAMUSCULAR; INTRAVENOUS AS NEEDED
Status: DISCONTINUED | OUTPATIENT
Start: 2023-07-14 | End: 2023-07-14

## 2023-07-14 RX ORDER — PROPOFOL 10 MG/ML
INJECTION, EMULSION INTRAVENOUS AS NEEDED
Status: DISCONTINUED | OUTPATIENT
Start: 2023-07-14 | End: 2023-07-14

## 2023-07-14 RX ORDER — ONDANSETRON 2 MG/ML
4 INJECTION INTRAMUSCULAR; INTRAVENOUS ONCE AS NEEDED
Status: DISCONTINUED | OUTPATIENT
Start: 2023-07-14 | End: 2023-07-14 | Stop reason: HOSPADM

## 2023-07-14 RX ORDER — HYDROMORPHONE HCL/PF 1 MG/ML
0.5 SYRINGE (ML) INJECTION
Status: DISCONTINUED | OUTPATIENT
Start: 2023-07-14 | End: 2023-07-14 | Stop reason: HOSPADM

## 2023-07-14 RX ORDER — FENTANYL CITRATE/PF 50 MCG/ML
50 SYRINGE (ML) INJECTION
Status: DISCONTINUED | OUTPATIENT
Start: 2023-07-14 | End: 2023-07-14 | Stop reason: HOSPADM

## 2023-07-14 RX ORDER — BUPIVACAINE HYDROCHLORIDE 5 MG/ML
INJECTION, SOLUTION EPIDURAL; INTRACAUDAL AS NEEDED
Status: DISCONTINUED | OUTPATIENT
Start: 2023-07-14 | End: 2023-07-14 | Stop reason: HOSPADM

## 2023-07-14 RX ORDER — FENTANYL CITRATE 50 UG/ML
INJECTION, SOLUTION INTRAMUSCULAR; INTRAVENOUS AS NEEDED
Status: DISCONTINUED | OUTPATIENT
Start: 2023-07-14 | End: 2023-07-14

## 2023-07-14 RX ADMIN — PANTOPRAZOLE SODIUM 40 MG: 40 TABLET, DELAYED RELEASE ORAL at 08:30

## 2023-07-14 RX ADMIN — SUGAMMADEX 160 MG: 100 INJECTION, SOLUTION INTRAVENOUS at 15:59

## 2023-07-14 RX ADMIN — MIDAZOLAM HYDROCHLORIDE 2 MG: 1 INJECTION, SOLUTION INTRAMUSCULAR; INTRAVENOUS at 14:48

## 2023-07-14 RX ADMIN — DEXAMETHASONE SODIUM PHOSPHATE 10 MG: 10 INJECTION, SOLUTION INTRAMUSCULAR; INTRAVENOUS at 14:56

## 2023-07-14 RX ADMIN — ONDANSETRON 4 MG: 2 INJECTION INTRAMUSCULAR; INTRAVENOUS at 15:45

## 2023-07-14 RX ADMIN — LIDOCAINE HYDROCHLORIDE 50 MG: 10 INJECTION, SOLUTION EPIDURAL; INFILTRATION; INTRACAUDAL; PERINEURAL at 14:52

## 2023-07-14 RX ADMIN — SODIUM CHLORIDE, SODIUM LACTATE, POTASSIUM CHLORIDE, AND CALCIUM CHLORIDE 75 ML/HR: .6; .31; .03; .02 INJECTION, SOLUTION INTRAVENOUS at 21:31

## 2023-07-14 RX ADMIN — ACETAMINOPHEN 650 MG: 325 TABLET, FILM COATED ORAL at 23:16

## 2023-07-14 RX ADMIN — FENTANYL CITRATE 50 MCG: 50 INJECTION, SOLUTION INTRAMUSCULAR; INTRAVENOUS at 14:52

## 2023-07-14 RX ADMIN — PROPOFOL 200 MG: 10 INJECTION, EMULSION INTRAVENOUS at 14:52

## 2023-07-14 RX ADMIN — OXYCODONE HYDROCHLORIDE 10 MG: 10 TABLET ORAL at 23:22

## 2023-07-14 RX ADMIN — ROCURONIUM BROMIDE 40 MG: 10 INJECTION, SOLUTION INTRAVENOUS at 14:52

## 2023-07-14 RX ADMIN — CEFAZOLIN 2000 MG: 1 INJECTION, POWDER, FOR SOLUTION INTRAMUSCULAR; INTRAVENOUS at 14:55

## 2023-07-14 RX ADMIN — KETOROLAC TROMETHAMINE 15 MG: 30 INJECTION, SOLUTION INTRAMUSCULAR at 15:45

## 2023-07-14 RX ADMIN — FENTANYL CITRATE 50 MCG: 50 INJECTION INTRAMUSCULAR; INTRAVENOUS at 16:38

## 2023-07-14 RX ADMIN — FENTANYL CITRATE 50 MCG: 50 INJECTION, SOLUTION INTRAMUSCULAR; INTRAVENOUS at 15:15

## 2023-07-14 NOTE — PROGRESS NOTES
Progress Note - Liz José 62 y.o. female MRN: [id number]    Unit/Bed#: S -01 Encounter: [id number]        Subjective:   Patient reports some throat soreness after her procedure yesterday, but otherwise denies any abdominal pain, nausea or vomiting at this time. No fevers or chills. Objective:     Vitals: Blood pressure 110/93, pulse 80, temperature 98 °F (36.7 °C), resp. rate 16, height 4' 11" (1.499 m), weight 81.6 kg (180 lb), SpO2 94 %, not currently breastfeeding. ,Body mass index is 36.36 kg/m². Intake/Output Summary (Last 24 hours) at 7/14/2023 4669  Last data filed at 7/13/2023 1720  Gross per 24 hour   Intake 500 ml   Output --   Net 500 ml       Physical Exam:   General appearance: alert, appears stated age and cooperative  Lungs: clear to auscultation bilaterally, no labored breathing/accessory muscle use  Heart: regular rate and rhythm, S1, S2 normal, no murmur, click, rub or gallop  Abdomen: soft, non-tender; bowel sounds normal; no masses,  no organomegaly  Extremities: no edema    Invasive Devices     Peripheral Intravenous Line  Duration           Peripheral IV 07/13/23 Dorsal (posterior); Right Hand 1 day                Lab, Imaging and other studies: I have personally reviewed pertinent reports.     Admission on 07/12/2023   Component Date Value   • WBC 07/12/2023 7.34    • RBC 07/12/2023 5.25 (H)    • Hemoglobin 07/12/2023 16.0 (H)    • Hematocrit 07/12/2023 48.1 (H)    • MCV 07/12/2023 92    • MCH 07/12/2023 30.5    • MCHC 07/12/2023 33.3    • RDW 07/12/2023 12.5    • MPV 07/12/2023 9.3    • Platelets 92/06/1855 285    • nRBC 07/12/2023 0    • Neutrophils Relative 07/12/2023 70    • Immat GRANS % 07/12/2023 0    • Lymphocytes Relative 07/12/2023 22    • Monocytes Relative 07/12/2023 6    • Eosinophils Relative 07/12/2023 1    • Basophils Relative 07/12/2023 1    • Neutrophils Absolute 07/12/2023 5.16    • Immature Grans Absolute 07/12/2023 0.03    • Lymphocytes Absolute 07/12/2023 1.58    • Monocytes Absolute 07/12/2023 0.45    • Eosinophils Absolute 07/12/2023 0.08    • Basophils Absolute 07/12/2023 0.04    • Sodium 07/12/2023 136    • Potassium 07/12/2023 4.2    • Chloride 07/12/2023 101    • CO2 07/12/2023 24    • ANION GAP 07/12/2023 11    • BUN 07/12/2023 13    • Creatinine 07/12/2023 1.18    • Glucose 07/12/2023 120    • Calcium 07/12/2023 9.9    • AST 07/12/2023 385 (H)    • ALT 07/12/2023 732 (H)    • Alkaline Phosphatase 07/12/2023 197 (H)    • Total Protein 07/12/2023 8.5 (H)    • Albumin 07/12/2023 4.6    • Total Bilirubin 07/12/2023 7.17 (H)    • eGFR 07/12/2023 50    • Lipase 07/12/2023 48    • Color, UA 07/12/2023 Dark Yellow    • Clarity, UA 07/12/2023 Turbid    • Specific Gravity, UA 07/12/2023 1.026    • pH, UA 07/12/2023 6.0    • Leukocytes, UA 07/12/2023 Large (A)    • Nitrite, UA 07/12/2023 Negative    • Protein, UA 07/12/2023 Trace (A)    • Glucose, UA 07/12/2023 Negative    • Ketones, UA 07/12/2023 20 (1+) (A)    • Urobilinogen, UA 07/12/2023 3.0 (A)    • Bilirubin, UA 07/12/2023 Moderate (A)    • Occult Blood, UA 07/12/2023 Large (A)    • Ventricular Rate 07/12/2023 87    • Atrial Rate 07/12/2023 87    • TX Interval 07/12/2023 156    • QRSD Interval 07/12/2023 78    • QT Interval 07/12/2023 336    • QTC Interval 07/12/2023 404    • P Axis 07/12/2023 52    • QRS Axis 07/12/2023 29    • T Wave Axis 07/12/2023 -9    • Hepatitis B Surface Ag 07/12/2023 Non-reactive    • Hep A IgM 07/12/2023 Non-reactive    • Hepatitis C Ab 07/12/2023 Non-reactive    • Hep B C IgM 07/12/2023 Reactive (A)    • RBC, UA 07/12/2023 4-10 (A)    • WBC, UA 07/12/2023 30-50 (A)    • Epithelial Cells 07/12/2023 Occasional    • Bacteria, UA 07/12/2023 Occasional    • MUCUS THREADS 07/12/2023 Occasional (A)    • Urine Culture 07/12/2023 Culture too young- will reincubate    • Bilirubin, Direct 07/12/2023 4.17 (H)    • Sodium 07/13/2023 137    • Potassium 07/13/2023 3.7    • Chloride 07/13/2023 104    • CO2 07/13/2023 22    • ANION GAP 07/13/2023 11    • BUN 07/13/2023 14    • Creatinine 07/13/2023 0.78    • Glucose 07/13/2023 90    • Calcium 07/13/2023 9.2    • AST 07/13/2023 227 (H)    • ALT 07/13/2023 552 (H)    • Alkaline Phosphatase 07/13/2023 165 (H)    • Total Protein 07/13/2023 7.3    • Albumin 07/13/2023 4.0    • Total Bilirubin 07/13/2023 6.05 (H)    • eGFR 07/13/2023 84    • WBC 07/13/2023 6.72    • RBC 07/13/2023 4.78    • Hemoglobin 07/13/2023 14.3    • Hematocrit 07/13/2023 43.5    • MCV 07/13/2023 91    • MCH 07/13/2023 29.9    • MCHC 07/13/2023 32.9    • RDW 07/13/2023 12.6    • Platelets 28/15/4599 250    • MPV 07/13/2023 9.3    • Hep B Core Total Ab 07/12/2023 Reactive (A)    • POC Glucose 07/13/2023 108    • Sodium 07/14/2023 137    • Potassium 07/14/2023 3.8    • Chloride 07/14/2023 106    • CO2 07/14/2023 24    • ANION GAP 07/14/2023 7    • BUN 07/14/2023 10    • Creatinine 07/14/2023 0.63    • Glucose 07/14/2023 77    • Glucose, Fasting 07/14/2023 77    • Calcium 07/14/2023 9.0    • AST 07/14/2023 97 (H)    • ALT 07/14/2023 347 (H)    • Alkaline Phosphatase 07/14/2023 146 (H)    • Total Protein 07/14/2023 6.8    • Albumin 07/14/2023 3.7    • Total Bilirubin 07/14/2023 2.48 (H)    • eGFR 07/14/2023 99    • WBC 07/14/2023 6.28    • RBC 07/14/2023 4.27    • Hemoglobin 07/14/2023 12.8    • Hematocrit 07/14/2023 39.0    • MCV 07/14/2023 91    • MCH 07/14/2023 30.0    • MCHC 07/14/2023 32.8    • RDW 07/14/2023 12.6    • MPV 07/14/2023 9.6    • Platelets 68/08/8006 239    • nRBC 07/14/2023 0    • Neutrophils Relative 07/14/2023 60    • Immat GRANS % 07/14/2023 1    • Lymphocytes Relative 07/14/2023 26    • Monocytes Relative 07/14/2023 9    • Eosinophils Relative 07/14/2023 3    • Basophils Relative 07/14/2023 1    • Neutrophils Absolute 07/14/2023 3.89    • Immature Grans Absolute 07/14/2023 0.03    • Lymphocytes Absolute 07/14/2023 1.62    • Monocytes Absolute 07/14/2023 0.54 • Eosinophils Absolute 07/14/2023 0.16    • Basophils Absolute 07/14/2023 0.04       Latest Reference Range & Units 07/13/23 05:09 07/14/23 04:36   Sodium 135 - 147 mmol/L 137 137   Potassium 3.5 - 5.3 mmol/L 3.7 3.8   Chloride 96 - 108 mmol/L 104 106   CO2 21 - 32 mmol/L 22 24   Anion Gap mmol/L 11 7   BUN 5 - 25 mg/dL 14 10   Creatinine 0.60 - 1.30 mg/dL 0.78 0.63   Glucose, Random 65 - 140 mg/dL 90 77   GLUCOSE FASTING 65 - 99 mg/dL  77   Calcium 8.4 - 10.2 mg/dL 9.2 9.0   AST 13 - 39 U/L 227 (H) 97 (H)   ALT 7 - 52 U/L 552 (H) 347 (H)   Alkaline Phosphatase 34 - 104 U/L 165 (H) 146 (H)   Total Protein 6.4 - 8.4 g/dL 7.3 6.8   Albumin 3.5 - 5.0 g/dL 4.0 3.7   TOTAL BILIRUBIN 0.20 - 1.00 mg/dL 6.05 (H) 2.48 (H)   eGFR ml/min/1.73sq m 84 99   (H): Data is abnormally high      Assessment/Plan:    #1. Obstructive jaundice and symptomatic choledocholithiasis status post ERCP with extraction of stones and sludge from the CBD.   Liver enzymes appear much improved today, patient shows no signs of procedural complication    -Plan for cholecystectomy as per general surgery    -N.p.o. for surgery today    -Monitor abdominal exam, temperature, white blood cell count    -Monitor liver enzymes

## 2023-07-14 NOTE — QUICK NOTE
Surgery Post-Op Check  [facility] 62 y.o. female MRN: [id number]  Unit/Bed#: S -05 Encounter: [id number]     S:   Patient seen and examined bedside. No acute complaints  Pain well controlled  Not tried anything PO yet and due to void  Resting comfortably  No nausea or emesis    O:   Vitals:    07/14/23 1722   BP: 122/77   Pulse: 67   Resp: 18   Temp: 98.4 °F (36.9 °C)   SpO2: (!) 89%     I/O       07/12 0701 07/13 0700 07/13 0701 07/14 0700 07/14 0701  07/15 0700    I.V. (mL/kg)  500 (6.1) 500 (6.1)    Total Intake(mL/kg)  500 (6.1) 500 (6.1)    Net  +500 +500               PE:  Gen:  No acute distress  CV:  Warm, well-perfused  Lung:  Normal work of breathing, no respiratory distress  Abd:  Soft, appropriately tender, nondistended, incisions c/d/i  Ext:  Moving all extremities  Neuro:  Alert and oriented, M/S grossly intact     Lab Results   Component Value Date    WBC 6.28 07/14/2023    HGB 12.8 07/14/2023    HCT 39.0 07/14/2023    MCV 91 07/14/2023     07/14/2023     Lab Results   Component Value Date    GLUCOSE 102 (H) 12/05/2017    CALCIUM 9.0 07/14/2023     12/05/2017    K 3.8 07/14/2023    CO2 24 07/14/2023     07/14/2023    BUN 10 07/14/2023    CREATININE 0.63 07/14/2023         A/P: 62 y.o. female Day of Surgery s/p Procedure(s) (LRB):  CHOLECYSTECTOMY LAPAROSCOPIC (N/A)    Plan:  • Diet as tolerated  • F/u am labs  • Due to void  • DVT ppx  • Out of bed, encourage ambulation  • Encourage incentive spirometer use  • Strict I's and O's  • Pain and nausea control p.r.n.   • Please tiger text on call surgery resident for questions or concerns      Yael Mccullough MD  PGY3, General Surgery

## 2023-07-14 NOTE — ANESTHESIA PREPROCEDURE EVALUATION
Procedure:  CHOLECYSTECTOMY LAPAROSCOPIC (Abdomen)    Relevant Problems   ANESTHESIA (within normal limits)   (-) History of anesthesia complications      CARDIO   (-) Chest pain   (-) ALONSO (dyspnea on exertion)      GI/HEPATIC   (+) GERD (gastroesophageal reflux disease)      PULMONARY   (-) Shortness of breath   (-) URI (upper respiratory infection)      Respiratory   (+) Laryngeal stenosis      Placement Date: 07/13/23; Placement Time: 1638; Mask Ventilation: Mask ventilation not attempted (0); Technique: bougie, Video laryngoscopy; Type: Cuffed, Oral, Inflated; Tube Size: 6 mm; Laryngoscope: Alicea; Blade Size: 3; Location: Oral; Grade View: 2b; Insertion Attempts: 1; Placement Verification: Cuff palpitation, End tidal CO2, Auscultation, Symmetrical chest wall movement; Placed By: CRNA; Removal Date: 07/13/23; Removal Time: 1720 07/13/23 1638 by Jose Ro-       Physical Exam    Airway    Mallampati score: II  TM Distance: >3 FB  Neck ROM: full     Dental       Cardiovascular      Pulmonary      Other Findings        Anesthesia Plan  ASA Score- 2     Anesthesia Type- general with ASA Monitors. Additional Monitors:   Airway Plan: ETT. Plan Factors-Exercise tolerance (METS): >4 METS. Chart reviewed. EKG reviewed. Existing labs reviewed. Patient summary reviewed. Induction- intravenous. Postoperative Plan-     Informed Consent- Anesthetic plan and risks discussed with patient. I personally reviewed this patient with the CRNA. Discussed and agreed on the Anesthesia Plan with the CRNA. Enedina Khan

## 2023-07-14 NOTE — OP NOTE
OPERATIVE REPORT  PATIENT NAME: Constantine Mosqueda    :  1965  MRN: [id number]  Pt Location: AN OR ROOM 01    SURGERY DATE: 2023    Surgeon(s) and Role:     * Canelo Clark DO - Primary     * Remigio Sharma MD - Assisting    Preop Diagnosis:  Cholelithiasis [K80.20]    Post-Op Diagnosis Codes:     * Cholelithiasis [K80.20]    Procedure(s):  CHOLECYSTECTOMY LAPAROSCOPIC    Specimen(s):  ID Type Source Tests Collected by Time Destination   1 : gallbladder Tissue Soft Tissue, Other TISSUE EXAM Canelo Clark DO 2023 1542        Estimated Blood Loss:   Minimal    Drains:  * No LDAs found *    Anesthesia Type:   General    Operative Indications:  Cholelithiasis [K80.20]  Choledocholithiasis    Operative Findings:  Cholelithiasis    Complications:   None    Procedure and Technique:  After informed consent was obtained explaining the risks/benefits/alternatives of the procedure, the patient was taken to the OR. General anesthesia was induced and the patient was prepped and draped in the usual sterile fashion. Sequential compression devices were placed. A time out was performed to verify correct site and procedure. A periumbilical incision was made and dissection carried down to the level of anterior fascia. Fascia was grasped with Kocher clamps, elevated and a small fascial incision was made. An 11 mm Tonia port was placed through the fascial opening, and the abdomen was insufflated with carbon dioxide to a pressure of 15 mmHg. The patient tolerated insufflation well. The laparoscope was introduced and the bowel was inspected to ensure that no inadvertent injuries made during entry into the abdomen. There were no injuries seen. Additional trocars were then inserted in the following locations under direct visualization:  5 mm trocar in the subxiphoid region and two 5 mm trocars along the right subcostal margin.   The table was placed in reverse Trendelenburg position with right side up. Adhesions between gallbladder and omentum/duodenum were taken down. The dome of the gallbladder was grasped with an atraumatic grasper through the lateral port retracted over the dome of the liver. The infundibulum was grasped also using an atraumatic grasper and retracted laterally. This maneuver exposed Calot's triangle. The peritoneum overlying the gallbladder infundibulum was then incised and the cystic duct and cystic artery identified and circumferentially dissected. The critical view of safety was obtained with only these 2 structures inserting into the gallbladder. The cystic duct was then triply clipped and divided, and the cystic artery doubly clipped and divided. The gallbladder was then dissected from its peritoneal attachments by electrocautery. Hemostasis was ensured throughout. The gallbladder was removed using an endoscopic retrieval bag placed through the supraumbilical port and was passed off the table as a specimen. The gallbladder fossa was then thoroughly irrigated with saline and hemostasis was again ensured. There was no evidence of bleeding from the gallbladder fossa or cystic artery or leakage of bile from the cystic duct stump. Fascia underlying the supraumbilical port was closed using 0 Vicryl. Secondary trocars were removed under direct vision. The laparoscope and umbilical trocar were removed. The abdomen was allowed to collapse. Skin was closed using buried 4-0 Monocryl suture. Skin glue was applied atop all incisions. All counts were correct x2 at the conclusion of the procedure and RF wanding was negative for any retained sponges. The patient was awakened and taken to the PACU without any immediate post op complications. Dr. Yaw Son was present for the entire procedure.     Patient Disposition:  PACU         SIGNATURE: Leopoldo Copes, MD  DATE: July 14, 2023  TIME: 4:23 PM

## 2023-07-14 NOTE — PROGRESS NOTES
Progress Note - General Surgery   Yvrose Siddiqi 62 y.o. female MRN: [id number]  Unit/Bed#: S -54 Encounter: [id number]    Assessment:  60-year-old female P/W epigastric and RUQ abdominal pain, stones and sludge seen on ultrasound, elevated T. bili, s/p endoscopic ultrasound without intervention today 7/13    VSS  WBC 6.28  T. Bili 2.48 from 6.0  Plan:  - N.p.o. for procedure   - OR today for laparoscopic possible open cholecystectomy, patient consented with risks and benefits explained  -Pain control and antiemetic as needed  -VTE PPx  -PPI  - Encourage OOB and ambulation    Subjective/Objective     Subjective: Patient seen and examined this morning at bedside. No acute events overnight. No complaints at this time. Ready for procedure today. Blood pressure 146/83, pulse 79, temperature 98.4 °F (36.9 °C), resp. rate 22, height 4' 11" (1.499 m), weight 81.6 kg (180 lb), SpO2 96 %, not currently breastfeeding. ,Body mass index is 36.36 kg/m². Intake/Output Summary (Last 24 hours) at 7/13/2023 2301  Last data filed at 7/13/2023 1720  Gross per 24 hour   Intake 500 ml   Output --   Net 500 ml       Invasive Devices     Peripheral Intravenous Line  Duration           Peripheral IV 07/13/23 Dorsal (posterior); Right Hand <1 day                Physical Exam:   General: NAD  Skin: Warm, dry, anicteric  HEENT: Normocephalic, atraumatic, scleral icterus  CV: RRR, no m/r/g  Pulm: CTA b/l, no inc WOB  Abd: Soft, minimally distended, non tender  MSK: Symmetric, no edema, no tenderness, no deformity  Neuro: AOx3, GCS 15    Lab, Imaging and other studies:  CBC:   Lab Results   Component Value Date    WBC 6.28 07/14/2023    HGB 12.8 07/14/2023    HCT 39.0 07/14/2023    MCV 91 07/14/2023     07/14/2023    RBC 4.27 07/14/2023    MCH 30.0 07/14/2023    MCHC 32.8 07/14/2023    RDW 12.6 07/14/2023    MPV 9.6 07/14/2023    NRBC 0 07/14/2023     VTE Pharmacologic Prophylaxis: Lovenox  VTE Mechanical Prophylaxis: sequential compression device

## 2023-07-14 NOTE — PLAN OF CARE
Problem: PAIN - ADULT  Goal: Verbalizes/displays adequate comfort level or baseline comfort level  Description: Interventions:  - Encourage patient to monitor pain and request assistance  - Assess pain using appropriate pain scale  - Administer analgesics based on type and severity of pain and evaluate response  - Implement non-pharmacological measures as appropriate and evaluate response  - Consider cultural and social influences on pain and pain management  - Notify physician/advanced practitioner if interventions unsuccessful or patient reports new pain  Outcome: Progressing     Problem: INFECTION - ADULT  Goal: Absence or prevention of progression during hospitalization  Description: INTERVENTIONS:  - Assess and monitor for signs and symptoms of infection  - Monitor lab/diagnostic results  - Monitor all insertion sites, i.e. indwelling lines, tubes, and drains  - Monitor endotracheal if appropriate and nasal secretions for changes in amount and color  - [place] appropriate cooling/warming therapies per order  - Administer medications as ordered  - Instruct and encourage patient and family to use good hand hygiene technique  - Identify and instruct in appropriate isolation precautions for identified infection/condition  Outcome: Progressing  Goal: Absence of fever/infection during neutropenic period  Description: INTERVENTIONS:  - Monitor WBC    Outcome: Progressing     Problem: SAFETY ADULT  Goal: Patient will remain free of falls  Description: INTERVENTIONS:  - Educate patient/family on patient safety including physical limitations  - Instruct patient to call for assistance with activity   - Consult OT/PT to assist with strengthening/mobility   - Keep Call bell within reach  - Keep bed low and locked with side rails adjusted as appropriate  - Keep care items and personal belongings within reach  - Initiate and maintain comfort rounds  - Make Fall Risk Sign visible to staff  - Offer Toileting every 2 Hours, in advance of need  - Initiate/Maintain bed alarm  - Obtain necessary fall risk management equipment  - Apply yellow socks and bracelet for high fall risk patients  - Consider moving patient to room near nurses station  Outcome: Progressing  Goal: Maintain or return to baseline ADL function  Description: INTERVENTIONS:  -  Assess patient's ability to carry out ADLs; assess patient's baseline for ADL function and identify physical deficits which impact ability to perform ADLs (bathing, care of mouth/teeth, toileting, grooming, dressing, etc.)  - Assess/evaluate cause of self-care deficits   - Assess range of motion  - Assess patient's mobility; develop plan if impaired  - Assess patient's need for assistive devices and provide as appropriate  - Encourage maximum independence but intervene and supervise when necessary  - Involve family in performance of ADLs  - Assess for home care needs following discharge   - Consider OT consult to assist with ADL evaluation and planning for discharge  - Provide patient education as appropriate  Outcome: Progressing  Goal: Maintains/Returns to pre admission functional level  Description: INTERVENTIONS:  - Perform BMAT or MOVE assessment daily.   - Set and communicate daily mobility goal to care team and patient/family/caregiver. - Collaborate with rehabilitation services on mobility goals if consulted  - Perform Range of Motion 2 times a day. - Reposition patient every 2 hours.   - Dangle patient 2 times a day  - Stand patient 2 times a day  - Ambulate patient 3 times a day  - Out of bed to chair 3 times a day   - Out of bed for meals 3 times a day  - Out of bed for toileting  - Record patient progress and toleration of activity level   Outcome: Progressing     Problem: DISCHARGE PLANNING  Goal: Discharge to home or other facility with appropriate resources  Description: INTERVENTIONS:  - Identify barriers to discharge w/patient and caregiver  - Arrange for needed discharge resources and transportation as appropriate  - Identify discharge learning needs (meds, wound care, etc.)  - Arrange for interpretive services to assist at discharge as needed  - Refer to Case Management Department for coordinating discharge planning if the patient needs post-hospital services based on physician/advanced practitioner order or complex needs related to functional status, cognitive ability, or social support system  Outcome: Progressing     Problem: Knowledge Deficit  Goal: Patient/family/caregiver demonstrates understanding of disease process, treatment plan, medications, and discharge instructions  Description: Complete learning assessment and assess knowledge base.   Interventions:  - Provide teaching at level of understanding  - Provide teaching via preferred learning methods  Outcome: Progressing

## 2023-07-14 NOTE — ANESTHESIA POSTPROCEDURE EVALUATION
Post-Op Assessment Note    CV Status:  Stable    Pain management: adequate     Mental Status:  Sleepy   Hydration Status:  Euvolemic   PONV Controlled:  Controlled   Airway Patency:  Patent      Post Op Vitals Reviewed: Yes      Staff: CRNA   Comments: VSS report RN        No notable events documented.     BP   131/70   Temp      Pulse  77   Resp      SpO2   96 FM 6l

## 2023-07-15 VITALS
WEIGHT: 180 LBS | DIASTOLIC BLOOD PRESSURE: 80 MMHG | HEART RATE: 67 BPM | RESPIRATION RATE: 18 BRPM | SYSTOLIC BLOOD PRESSURE: 122 MMHG | HEIGHT: 59 IN | OXYGEN SATURATION: 96 % | TEMPERATURE: 98.1 F | BODY MASS INDEX: 36.29 KG/M2

## 2023-07-15 LAB
ALBUMIN SERPL BCP-MCNC: 3.7 G/DL (ref 3.5–5)
ALP SERPL-CCNC: 122 U/L (ref 34–104)
ALT SERPL W P-5'-P-CCNC: 255 U/L (ref 7–52)
ANION GAP SERPL CALCULATED.3IONS-SCNC: 9 MMOL/L
AST SERPL W P-5'-P-CCNC: 92 U/L (ref 13–39)
BILIRUB SERPL-MCNC: 1.59 MG/DL (ref 0.2–1)
BUN SERPL-MCNC: 14 MG/DL (ref 5–25)
CALCIUM SERPL-MCNC: 8.9 MG/DL (ref 8.4–10.2)
CHLORIDE SERPL-SCNC: 106 MMOL/L (ref 96–108)
CO2 SERPL-SCNC: 24 MMOL/L (ref 21–32)
CREAT SERPL-MCNC: 0.63 MG/DL (ref 0.6–1.3)
ERYTHROCYTE [DISTWIDTH] IN BLOOD BY AUTOMATED COUNT: 12.5 % (ref 11.6–15.1)
GFR SERPL CREATININE-BSD FRML MDRD: 99 ML/MIN/1.73SQ M
GLUCOSE SERPL-MCNC: 112 MG/DL (ref 65–140)
HCT VFR BLD AUTO: 38.3 % (ref 34.8–46.1)
HGB BLD-MCNC: 12.7 G/DL (ref 11.5–15.4)
MCH RBC QN AUTO: 30.2 PG (ref 26.8–34.3)
MCHC RBC AUTO-ENTMCNC: 33.2 G/DL (ref 31.4–37.4)
MCV RBC AUTO: 91 FL (ref 82–98)
PLATELET # BLD AUTO: 242 THOUSANDS/UL (ref 149–390)
PMV BLD AUTO: 9.4 FL (ref 8.9–12.7)
POTASSIUM SERPL-SCNC: 3.9 MMOL/L (ref 3.5–5.3)
PROT SERPL-MCNC: 6.6 G/DL (ref 6.4–8.4)
RBC # BLD AUTO: 4.2 MILLION/UL (ref 3.81–5.12)
SODIUM SERPL-SCNC: 139 MMOL/L (ref 135–147)
WBC # BLD AUTO: 6.77 THOUSAND/UL (ref 4.31–10.16)

## 2023-07-15 PROCEDURE — NC001 PR NO CHARGE: Performed by: SURGERY

## 2023-07-15 PROCEDURE — 85027 COMPLETE CBC AUTOMATED: CPT | Performed by: SURGERY

## 2023-07-15 PROCEDURE — 80053 COMPREHEN METABOLIC PANEL: CPT | Performed by: SURGERY

## 2023-07-15 PROCEDURE — 99024 POSTOP FOLLOW-UP VISIT: CPT | Performed by: SURGERY

## 2023-07-15 RX ADMIN — ENOXAPARIN SODIUM 40 MG: 40 INJECTION SUBCUTANEOUS at 07:28

## 2023-07-15 RX ADMIN — PANTOPRAZOLE SODIUM 40 MG: 40 TABLET, DELAYED RELEASE ORAL at 07:28

## 2023-07-15 RX ADMIN — ACETAMINOPHEN 650 MG: 325 TABLET, FILM COATED ORAL at 05:59

## 2023-07-15 NOTE — UTILIZATION REVIEW
Continued Stay Review    7/12/23 2152 Observation AND CONVERTED 7/15/23 0912 TO INPATIENT AS PATIENT  IN OBSERVATION > 3 MIDNIGHTS FOR TREATMENT OF CHOLELITHIASIS AND CHOLEDOCHOLITHIASIS S/p ERCP w/sphincterotomy on 7/13 and s/p laparoscopic cholecystectomy on 7/14     Admission Orders (From admission, onward)     Ordered        07/15/23 0912  Inpatient Admission  Once                      Orders Placed This Encounter   Procedures   • Inpatient Admission     Standing Status:   Standing     Number of Occurrences:   1     Order Specific Question:   Level of Care     Answer:   Med Surg [16]     Order Specific Question:   Estimated length of stay     Answer:   More than 2 Midnights     Order Specific Question:   Certification     Answer:   I certify that inpatient services are medically necessary for this patient for a duration of greater than two midnights. See H&P and MD Progress Notes for additional information about the patient's course of treatment. Date: 7/15/23                         Current Patient Class: INPATIENT   Current Level of Care: med surg     HPI:58 y.o. female initially admitted on 7/12/23 to observation due to abdominal pain/cholelithiasis/probable choledocholithiasis/Hyperbilirubienmia. Presented due to epigastric and RUQ abdominal pain. On US saw gallbladder  stones and sludge. CBD 7 MM. LFT and bilirubin elevated. MRCP, GI consult, clears, IVF and pain control started. Per GI on 7/13/23 patient with obstructive juandice and biliary ductal dilation. ERCP done with sphincterotomy and stone extraction. IVF and NPO continued. On 7/14/23 operative intervention with Lap zacarias. Post operatively  IVF continued. Pain control. Diet started. Assessment/Plan:  7/15/23 CHANGED TO INPATIENT  POD #1 s/p lap zacarias. Diet and pain tolerated. On exam abdominal incision c/d/i. To continue pain control and tylenol is scheduled, continue protonix. Dc IVF today.        Vital Signs: 07/15/23 07:49:05 98.1 °F (36.7 °C) -- 18 122/80 94 -- -- -- --   07/15/23 02:30:02 98 °F (36.7 °C) -- 18 128/77 94 -- -- -- --   07/14/23 22:02:35 98.2 °F (36.8 °C) 64 18 132/78 96 87 % Abnormal  -- -- --   07/14/23 21:11:38 98 °F (36.7 °C) -- 18 127/86 100 -- -- -- --   07/14/23 19:50:02 98 °F (36.7 °C) 78 18 117/77 90 91 % -- -- --   07/14/23 1800 -- 75 -- 152/77 102 94 % -- -- --   07/14/23 17:22:40 98.4 °F (36.9 °C) 67 18 122/77 92 89 % Abnormal  -- -- --   07/14/23 1615 98.5 °F (36.9 °C) 72 13 131/70 -- 99 % 6 L/min Simple mask WDL   07/14/23 1243 98.6 °F (37 °C) 76 18 140/75 -- 97 % -- None (Room air) --   07/14/23 07:58:21 98 °F (36.7 °C) 80 16 110/93 99 94 % -- -- --   07/13/23 22:27:44 98.4 °F (36.9 °C) 79 -- 146/83 104 96 % -- -- --   07/13/23 1728 97.1 °F (36.2 °C) Abnormal  84 15 143/76 103 99 % 4 L/min Simple mask WDL   07/13/23 15:07:02 98 °F (36.7 °C) 61 -- 138/81 100 95 % -- -- --   07/13/23 06:51:05 97.8 °F (36.6 °C) 62 18 123/76 92 93 % --       Pertinent Labs/Diagnostic Results:   US right upper quadrant   Final Result by Matthew Cohn MD (07/12 2012)      Gallbladder demonstrates stones as well as extensive echogenic sludge and gravel with twinkle artifacts. No abnormality was seen in the gallbladder lumen on the study of May 23 to suggest any polyp. No wall thickening or sonographic Trenia Reamer sign is noted. These findings should be correlated clinically for any evidence of acute gallbladder inflammation. Nuclear medicine hepatobiliary study may be useful to assess for cystic duct obstruction. The common duct is borderline at 7 mm given the elevated bilirubin choledocholithiasis is a consideration in the duct caliber appears to have increased since the prior CT. MRCP may be useful. The study was marked in [facility] for immediate notification.       Workstation performed: [id number]         FL ERCP biliary only    7/13/23 The common bile duct was deeply cannulated after 1 attempt using a traction sphincterotome. Cannulation was not difficult and no bleeding was observed. Contrast was injected. Cholangiogram showed common bile duct measuring about 9 to 10 mm with small filling defect distally.   Sphincterotomy was performed and the duct was swept with 9 to 12 mm balloon catheter with extraction of stone fragments and sludge (during the sphincterotomy the cutting wire broke suddenly with quick sphincterotomy     Results from last 7 days   Lab Units 07/15/23  0541 07/14/23  0436 07/13/23  0509 07/12/23  1714   WBC Thousand/uL 6.77 6.28 6.72 7.34   HEMOGLOBIN g/dL 12.7 12.8 14.3 16.0*   HEMATOCRIT % 38.3 39.0 43.5 48.1*   PLATELETS Thousands/uL 242 239 250 285   NEUTROS ABS Thousands/µL  --  3.89  --  5.16     Results from last 7 days   Lab Units 07/15/23  0541 07/14/23  0436 07/13/23  0509 07/12/23  1714   SODIUM mmol/L 139 137 137 136   POTASSIUM mmol/L 3.9 3.8 3.7 4.2   CHLORIDE mmol/L 106 106 104 101   CO2 mmol/L 24 24 22 24   ANION GAP mmol/L 9 7 11 11   BUN mg/dL 14 10 14 13   CREATININE mg/dL 0.63 0.63 0.78 1.18   EGFR ml/min/1.73sq m 99 99 84 50   CALCIUM mg/dL 8.9 9.0 9.2 9.9     Results from last 7 days   Lab Units 07/15/23  0541 07/14/23  0436 07/13/23  0509 07/12/23  2140 07/12/23  1714   AST U/L 92* 97* 227*  --  385*   ALT U/L 255* 347* 552*  --  732*   ALK PHOS U/L 122* 146* 165*  --  197*   TOTAL PROTEIN g/dL 6.6 6.8 7.3  --  8.5*   ALBUMIN g/dL 3.7 3.7 4.0  --  4.6   TOTAL BILIRUBIN mg/dL 1.59* 2.48* 6.05*  --  7.17*   BILIRUBIN DIRECT mg/dL  --   --   --  4.17*  --      Results from last 7 days   Lab Units 07/13/23  1812   POC GLUCOSE mg/dl 108     Results from last 7 days   Lab Units 07/15/23  0541 07/14/23  0436 07/13/23  0509 07/12/23  1714   GLUCOSE RANDOM mg/dL 112 77 90 120     Results from last 7 days   Lab Units 07/12/23  1919   HEP B S AG  Non-reactive   HEP C AB  Non-reactive   HEP B C IGM  Reactive*   HEP B C TOTAL AB  Reactive*     Results from last 7 days   Lab Units 07/12/23  1714   LIPASE u/L 48     Results from last 7 days   Lab Units 07/12/23  1934   CLARITY UA  Turbid   COLOR UA  Dark Yellow   SPEC GRAV UA  1.026   PH UA  6.0   GLUCOSE UA mg/dl Negative   KETONES UA mg/dl 20 (1+)*   BLOOD UA  Large*   PROTEIN UA mg/dl Trace*   NITRITE UA  Negative   BILIRUBIN UA  Moderate*   UROBILINOGEN UA (BE) mg/dl 3.0*   LEUKOCYTES UA  Large*   WBC UA /hpf 30-50*   RBC UA /hpf 4-10*   BACTERIA UA /hpf Occasional   EPITHELIAL CELLS WET PREP /hpf Occasional   MUCUS THREADS  Occasional*     Results from last 7 days   Lab Units 07/12/23  1934   URINE CULTURE  >100,000 cfu/ml       Medications:   Scheduled Medications:  acetaminophen, 650 mg, Oral, Q6H SALLIE  enoxaparin, 40 mg, Subcutaneous, Daily  pantoprazole, 40 mg, Oral, Daily      Continuous IV Infusions:  lactated ringers, 75 mL/hr, Intravenous, Continuous      PRN Meds:  HYDROmorphone, 0.5 mg, Intravenous, Q4H PRN x 1 7/13  ondansetron, 4 mg, Intravenous, Q6H PRN  oxyCODONE, 10 mg, Oral, Q4H PRN x 1 7/14  oxyCODONE, 5 mg, Oral, Q4H PRN        Discharge Plan: to be determined. Network Utilization Review Department  ATTENTION: Please call with any questions or concerns to [phone] and carefully listen to the prompts so that you are directed to the right person. All voicemails are confidential.  Carson Amen all requests for admission clinical reviews, approved or denied determinations and any other requests to dedicated fax number below belonging to the campus where the patient is receiving treatment.  List of dedicated fax numbers for the Facilities:  [place] DENIALS (Administrative/Medical Necessity) [phone] [address] (Maternity/NICU/Pediatrics) [phone]   [address] [phone]   [facility] [phone]   [address] [phone] [address] - Mariola Rawls [phone]   [address] [address] [facility] [address] [facility] [phone]

## 2023-07-15 NOTE — DISCHARGE SUMMARY
Discharge Summary - Chiara Langley 62 y.o. female MRN: [id number]    Unit/Bed#: S -31 Encounter: [id number]    Admission Date:   Admission Orders (From admission, onward)     Ordered        07/15/23 0912  Inpatient Admission  Once            07/12/23 2151  Place in Observation  Once                        Admitting Diagnosis: Cholelithiasis [K80.20]  Epigastric abdominal pain [R10.13]  Abdominal pain [R10.9]  Elevated LFTs [R79.89]  Gallbladder sludge [K82.8]  Total bilirubin, elevated [R17]    HPI: 51-year-old female with PMH of GERD, HLD, [facility] on 7/12 reporting 3 days of acutely worsening abdominal pain localized to the epigastric and right upper quadrant regions along with associated nausea. She was found on work-up to have gallstones and sludge on imaging as well as significantly elevated total bilirubin 7.17. She was admitted to the general surgery service, started on IVF resuscitation, DVT prophylaxis, standard analgesic regimen. Given concern for choledocholithiasis, our gastroenterology colleagues were consulted, she was taken for ERCP on 7/13 and underwent sphincterotomy and CBD stone extraction, she tolerated the procedure well without issues. Postprocedure she did well, tolerated clear liquids without nausea or emesis, had downtrending total bilirubin and remained pain controlled. She was taken to the OR on 7/14 for laparoscopic cholecystectomy, she tolerated this well without issues. Postoperatively she did well, was tolerating low-fat diet without nausea or emesis, voiding without issues, ambulating without issues, had pain controlled, was having bowel function. On 7/15 she was stable for discharge. She will follow-up with us in the outpatient setting in approximately 2 weeks. Procedures Performed: No orders of the defined types were placed in this encounter.   7/13 ERCP  7/14 laparoscopic cholecystectomy    Summary of Hospital Course: See above HPI    Significant Findings, Care, Treatment and Services Provided: See above HPI    Complications: None    Discharge Diagnosis: Choledocholithiasis    Medical Problems     Resolved Problems  Date Reviewed: 7/13/2023   None         Condition at Discharge: good         Discharge instructions/Information to patient and family:   See after visit summary for information provided to patient and family. Provisions for Follow-Up Care:  See after visit summary for information related to follow-up care and any pertinent home health orders. PCP: Luis Heath MD    Disposition: Home    Planned Readmission: No      Discharge Statement   I spent 30 minutes discharging the patient. This time was spent on the day of discharge. I had direct contact with the patient on the day of discharge. Additional documentation is required if more than 30 minutes were spent on discharge. Discharge Medications:  See after visit summary for reconciled discharge medications provided to patient and family.

## 2023-07-15 NOTE — PROGRESS NOTES
Progress Note - Brenna Kowalski 62 y.o. female MRN: [id number]    Unit/Bed#: S -92 Encounter: [id number]      Assessment:  Brenna Kowalski is a 62 y.o. female with cholelithiasis and choledocholithiasis s/p ERCP w/sphincterotomy on 7/13 and s/p laparoscopic cholecystectomy on 7/14    VSS afebrile  Tbil 1.59 yesterday 2.48, LFTS 91/255/122 from 97/347/146  WBC 6.7, Hgb 12.7      Plan:  Low fat diet as tolerated  Strict I/Os  Pain control PRN  DVT ppx  Q1hr IS use  Dispo w/outpt f/u      Subjective:   Patient seen and examined bedside. No overnight events  Voiding spontaneously. +BMs  No nausea or emesis. Tolerating diet. Objective:     Vitals: Blood pressure 122/80, pulse 67, temperature 98.1 °F (36.7 °C), resp. rate 18, height 4' 11" (1.499 m), weight 81.6 kg (180 lb), SpO2 96 %, not currently breastfeeding. ,Body mass index is 36.36 kg/m². Intake/Output Summary (Last 24 hours) at 7/15/2023 0951  Last data filed at 7/15/2023 4419  Gross per 24 hour   Intake 500 ml   Output 101 ml   Net 399 ml       Physical Exam:   General - no acute distress, responsive and cooperative  CV - warm, regular rate  Pulm - normal work of breathing, no respiratory distress  Abd - soft, nondistended, incisions c/d/i  Neuro - m/s grossly intact, cn grossly intact  Ext - moving all extremities       Invasive Devices     Peripheral Intravenous Line  Duration           Peripheral IV 07/14/23 Dorsal (posterior); Right Forearm <1 day                Lab, Imaging and other studies: I have personally reviewed pertinent reports.     VTE Pharmacologic Prophylaxis: Enoxaparin (Lovenox)  VTE Mechanical Prophylaxis: sequential compression device

## 2023-07-15 NOTE — DISCHARGE INSTR - AVS FIRST PAGE
Please call the office when you leave to schedule an appointment to be seen in 2 weeks    Activity:    Do not lift more than 10 pounds (a gallon of milk) for 1-2 weeks post-operatively                 Walking is encouraged  Normal daily activities including climbing steps are okay  Do not engage in strenuous activity or contact sports for 4-6 weeks post-operatively    Return to Work:   Okay to return to work in one week    Diet:   You may return to your normal heart healthy diet, low fat. Wound Care: Your wound is closed with dissolvable stitches and glue. It is okay to shower. Wash incision gently with soap and water and pat dry. Do not soak incisions in bath water or swim for two weeks. Do not apply any creams or ointments. Pain Medication:   Please take as directed  No driving while taking narcotic pain medications    Other:  If you have questions after discharge please call the office.     If you have increased pain, fever >101.5, increased drainage, redness or a bad smell at your surgery site, please call us immediately or come directly to the Emergency Room

## 2023-07-16 LAB
ATRIAL RATE: 87 BPM
P AXIS: 52 DEGREES
PR INTERVAL: 156 MS
QRS AXIS: 29 DEGREES
QRSD INTERVAL: 78 MS
QT INTERVAL: 336 MS
QTC INTERVAL: 404 MS
T WAVE AXIS: -9 DEGREES
VENTRICULAR RATE: 87 BPM

## 2023-07-16 PROCEDURE — 93010 ELECTROCARDIOGRAM REPORT: CPT | Performed by: INTERNAL MEDICINE

## 2023-07-16 NOTE — UTILIZATION REVIEW
Felix Sommers, RN  Registered Nurse  Specialty:  Utilization Review  Utilization Review     Addendum  Date of Service:  7/13/2023  8:37 AM     Expand All Collapse All    Initial Clinical Review     Admission: Date/Time/Statement:       Admission Orders (From admission, onward)       Ordered         07/12/23 2151   Place in Observation  Once                               Orders Placed This Encounter   Procedures   • Place in Observation       Standing Status:   Standing       Number of Occurrences:   1       Order Specific Question:   Level of Care       Answer:   Med Surg [16]               ED Arrival Information               Expected   -    Arrival   7/12/2023 17:03    Acuity   Urgent              Means of arrival   Walk-In    Escorted by   Spouse    Service   Surgery-General    Admission type   Emergency              Arrival complaint   ABDOMINAL PAIN                   Chief Complaint   Patient presents with   • Abdominal Pain        reports "same thing that she was here for the last 6 weeks, was seen at Dr today and was told to go to ER for further testing"         Initial Presentation: 62 y.o. female PMH of HLD, GERD who presents to ED from GI office with  3 days of acutely worsening abdominal pain- epigastric and right upper quadrant regions, associated nausea. On exam, pain constant, had small bm today, urine dark , pt reports feeling bloated with abdomen soft, minimally distended, tender TTP in epigastric and RUQ. Scleral icterus Labs-elevated T bili,elevated LFT's . LFT's . UA- large leuks,  moderate bilirubinur. US RUQ shows sludge and stones . Pt given IVF, IV analgesic in ED. PT admitted as OBS by gene sx service with abdominal pain, cholelithiasis, probable choledocholithiasis, hyperbilirubinemia. . Plan - IVF, NPO after MN, GI consult, MRCP vs ERCP. Pain control. Lovenox, PPI .   Will need lap zacarias once common bile duct has been determined to be clear.     Date: 7/13  general sx- WBC stable, T bili down to 6.05 today, D bili 4.17 . Pt will need ERCP, await GI consult. Pt NPO. Reports improving abdominal pain, no n/v, drank some water before bed, passing flatus. Abdomen minimally distended, TTP RUQ and epigastric . IVF infusing . GI consult- Epigastric pain, elevated liver enzymes in obstructive pattern  , gallstones, obstructing choledocholithiasis . US RUQ shows borderline ductal dilation with common bile duct 7 mm, as well as intraluminal gallbladder stones with sludge and gravel.  Liver enzymes are elevated with a bilirubin of 6.05, alkaline phosphatase 165, , . Urine is still dark. Plan- ERCP, NPO, monitor abdominal exams, temp, WBC's . Charlotte and timing of cholecystectomy as per surgical service     Date 7/14-   General sx-Obstructive jaundice and symptomatic choledocholithiasis . Pt had ERCP w/ sphincterotomy  7/13, extraction sludge and stone fragments. Reports some throat soreness s/p  Procedure yesaterday , LFT's and T bili down trending . Pt NPO for lap possible open cholecystectomy today. IVF infusing. Abdomen  soft, minimally distended, non tender.  Monitor LFT's , WBC .   7/14/23 @ 1505  CHOLECYSTECTOMY LAPAROSCOPIC   General anesthesia   Operative Findings:Cholelithiasis            ED Triage Vitals [07/12/23 1708]   Temperature Pulse Respirations Blood Pressure SpO2   98 °F (36.7 °C) 83 16 134/84 95 %       Temp Source Heart Rate Source Patient Position - Orthostatic VS BP Location FiO2 (%)   Oral Monitor Sitting Right arm --       Pain Score           10 - Worst Possible Pain              Wt Readings from Last 1 Encounters:   07/13/23 81.6 kg (180 lb)      Additional Vital Signs:   Date/Time Temp Pulse Resp BP MAP (mmHg) SpO2 O2 Flow Rate (L/min) O2 Device   07/14/23 07:58:21 98 °F (36.7 °C) 80 16 110/93 99 94 % -- --   07/14/23 0000 -- -- -- -- -- -- -- None (Room air)   07/13/23 22:27:44 98.4 °F (36.9 °C) 79 -- 146/83 104 96 % -- --   07/13/23 1745 -- 86 22 110/80 92 96 % -- None (Room air)   07/13/23 1730 -- 82 12 145/82 107 99 % -- None (Room air)   07/13/23 1728 97.1 °F (36.2 °C) Abnormal  84 15 143/76 103 99 % 4 L/min Simple mask   07/13/23 1548 96 °F (35.6 °C) Abnormal  71 20 143/85 -- 99 % -- None (Room air)   07/13/23 15:07:02 98 °F (36.7 °C) 61 -- 138/81 100 95 % --        Date/Time Temp Pulse Resp BP MAP (mmHg) SpO2   07/13/23 06:51:05 97.8 °F (36.6 °C) 62 18 123/76 92 93 %   07/13/23 0000 -- -- -- -- -- --   07/12/23 23:31:20 98.6 °F (37 °C) 81 -- 145/98 114 95 %   07/12/23 23:31:04 98.6 °F (37 °C) 82 -- 145/98 114 95 %   07/12/23 2223 -- 75 16 135/72 97 96 %   07/12/23 2130 -- 80 -- 143/70 98 96 %   07/12/23 2100 -- 77 -- 142/86 108 96 %   07/12/23 2015 -- 75 -- 139/74 99 95 %   07/12/23 2003 -- 76 16 132/78 99 95 %      Pertinent Labs/Diagnostic Test Results:      US right upper quadrant   Final Result by Wicho Piper MD (07/12 2012)       Gallbladder demonstrates stones as well as extensive echogenic sludge and gravel with twinkle artifacts. No abnormality was seen in the gallbladder lumen on the study of May 23 to suggest any polyp.       No wall thickening or sonographic Buckholts Deiters sign is noted. These findings should be correlated clinically for any evidence of acute gallbladder inflammation. Nuclear medicine hepatobiliary study may be useful to assess for cystic duct obstruction.       The common duct is borderline at 7 mm given the elevated bilirubin choledocholithiasis is a consideration in the duct caliber appears to have increased since the prior CT. MRCP may be useful.       The study was marked in [facility] for immediate notification.       Workstation performed: [id number]           FL ERCP biliary only    (Results Pending)   7/13/23 ERCP-  FINDINGS:  The common bile duct was deeply cannulated after 1 attempt using a traction sphincterotome. Cannulation was not difficult and no bleeding was observed. Contrast was injected.  Cholangiogram showed common bile duct measuring about 9 to 10 mm with small filling defect distally.  Sphincterotomy was performed and the duct was swept with 9 to 12 mm balloon catheter with extraction of stone fragments and sludge (during the sphincterotomy the cutting wire broke suddenly with quick sphincterotomy)                   Results from last 7 days   Lab Units 07/14/23  0436 07/13/23  0509 07/12/23  1714   WBC Thousand/uL 6.28 6.72 7.34   HEMOGLOBIN g/dL 12.8 14.3 16.0*   HEMATOCRIT % 39.0 43.5 48.1*   PLATELETS Thousands/uL 239 250 285   NEUTROS ABS Thousands/µL 3.89  --  5.16          Results from last 7 days   Lab Units 07/14/23 0436 07/13/23  0509 07/12/23  1714   SODIUM mmol/L 137 137 136   POTASSIUM mmol/L 3.8 3.7 4.2   CHLORIDE mmol/L 106 104 101   CO2 mmol/L 24 22 24   ANION GAP mmol/L 7 11 11   BUN mg/dL 10 14 13   CREATININE mg/dL 0.63 0.78 1.18   EGFR ml/min/1.73sq m 99 84 50   CALCIUM mg/dL 9.0 9.2 9.9              Results from last 7 days   Lab Units 07/14/23  0436 07/13/23  0509 07/12/23  2140 07/12/23  1714   AST U/L 97* 227*  --  385*   ALT U/L 347* 552*  --  732*   ALK PHOS U/L 146* 165*  --  197*   TOTAL PROTEIN g/dL 6.8 7.3  --  8.5*   ALBUMIN g/dL 3.7 4.0  --  4.6   TOTAL BILIRUBIN mg/dL 2.48* 6.05*  --  7.17*   BILIRUBIN DIRECT mg/dL  --   --  4.17*  --            Results from last 7 days   Lab Units 07/13/23  1812   POC GLUCOSE mg/dl 108             Results from last 7 days   Lab Units 07/14/23  0436 07/13/23  0509 07/12/23  1714   GLUCOSE RANDOM mg/dL 77 90 120              No results found for: "BETA-HYDROXYBUTYRATE"                                                                        Results from last 7 days   Lab Units 07/12/23  1919   HEP B S AG   Non-reactive   HEP C AB   Non-reactive   HEP B C IGM   Reactive*   HEP B C TOTAL AB   Reactive*           Results from last 7 days   Lab Units 07/12/23  1714   LIPASE u/L 48                       Results from last 7 days   Lab Units 07/12/23  1934   CLARITY UA   Turbid COLOR UA   Dark Yellow   SPEC GRAV UA   1.026   PH UA   6.0   GLUCOSE UA mg/dl Negative   KETONES UA mg/dl 20 (1+)*   BLOOD UA   Large*   PROTEIN UA mg/dl Trace*   NITRITE UA   Negative   BILIRUBIN UA   Moderate*   UROBILINOGEN UA (BE) mg/dl 3.0*   LEUKOCYTES UA   Large*   WBC UA /hpf 30-50*   RBC UA /hpf 4-10*   BACTERIA UA /hpf Occasional   EPITHELIAL CELLS WET PREP /hpf Occasional   MUCUS THREADS   Occasional*                                       Results from last 7 days   Lab Units 07/12/23  1934   URINE CULTURE   Culture too young- will reincubate                     ED Treatment:             Medication Administration from 07/12/2023 1703 to 07/12/2023 2319        Date/Time Order Dose Route Action       07/12/2023 1947 EDT HYDROmorphone HCl (DILAUDID) injection 0.2 mg 0.2 mg Intravenous Given       07/12/2023 2215 EDT lactated ringers infusion 125 mL/hr Intravenous New Bag          Medical History        Past Medical History:   Diagnosis Date   • Adult-onset obesity     • Allergic rhinitis     • Disc displacement, cervical     • GERD (gastroesophageal reflux disease)     • Retinal disorders       last assessed 6/30/14   • Tuberculosis, laryngeal     • Vitamin D deficiency disease       last assessed 6/30/14         Present on Admission:  **None**        Admitting Diagnosis: Cholelithiasis [K80.20]  Epigastric abdominal pain [R10.13]  Abdominal pain [R10.9]  Elevated LFTs [R79.89]  Gallbladder sludge [K82.8]  Total bilirubin, elevated [R17]  Age/Sex: 62 y.o. female  Admission Orders:  Scheduled Medications:  acetaminophen, 650 mg, Oral, Q6H SALLIE  enoxaparin, 40 mg, Subcutaneous, Daily  pantoprazole, 40 mg, Oral, Daily     potassium chloride (K-DUR,KLOR-CON) CR tablet 20 mEq  Dose: 20 mEq  Freq:  Once Route: PO  Start: 07/13/23 0945 End: 07/13/23 1145  Continuous IV Infusions:  lactated ringers, 125 mL/hr, Intravenous, Continuous        PRN Meds:  HYDROmorphone, 0.5 mg, Intravenous, Q4H PRN x1 7/13  ondansetron, 4 mg, Intravenous, Q6H PRN  oxyCODONE, 10 mg, Oral, Q4H PRN  oxyCODONE, 5 mg, Oral, Q4H PRN  indomethacin (INDOCIN) rectal suppository  Freq: As needed  Start: 07/13/23 1644 End: 07/13/23 1644           NPO 7/13 !0001     IP CONSULT TO GASTROENTEROLOGY     Network Utilization Review Department  ATTENTION: Please call with any questions or concerns to [phone] and carefully listen to the prompts so that you are directed to the right person. All voicemails are confidential.  Colleen Caruso all requests for admission clinical reviews, approved or denied determinations and any other requests to dedicated fax number below belonging to the campus where the patient is receiving treatment.  List of dedicated fax numbers for the Facilities:  [place] DENIALS (Administrative/Medical Necessity) [phone] [address] [facility] (Maternity/NICU/Pediatrics) [phone]   [address] [place] [address] Drive [phone]   [facility] [address] [facility] [phone]   [address] [facility] 207 [facility] [address] [address] [facility] [phone]   [address] [phone]   [address] [place] [address] [id number] [place] [address] [phone]                 Revision History

## 2023-07-17 NOTE — UTILIZATION REVIEW
NOTIFICATION OF ADMISSION DISCHARGE   This is a Notification of Discharge from [facility]. Please be advised that this patient has been discharge from our facility. Below you will find the admission and discharge date and time including the patient’s disposition. UTILIZATION REVIEW CONTACT:  Sen Greenberg MA  Utilization   Network Utilization Review Department  Phone: [phone] x carefully listen to the prompts. All voicemails are confidential.  Email: [email or web address]     ADMISSION INFORMATION  PRESENTATION DATE: 7/12/2023  6:15 PM  OBERVATION ADMISSION DATE:   INPATIENT ADMISSION DATE: 7/15/23  9:12 AM   DISCHARGE DATE: 7/15/2023 12:25 PM   DISPOSITION:Home/Self Care    IMPORTANT INFORMATION:  Send all requests for admission clinical reviews, approved or denied determinations and any other requests to dedicated fax number below belonging to the campus where the patient is receiving treatment.  List of dedicated fax numbers:  [place] DENIALS (Administrative/Medical Necessity) [phone] 2303 [facility] (Maternity/NICU/Pediatrics) [phone]   [facility] [phone]   [facility] [phone] 1636 [facility] [phone]   [address] [place] [address] [phone]   [facility] [phone]   [address] [place] [address] 608 [facility] [phone]   [address] [place] [address] [phone]   [address] [facility] [phone]   [address] [facility] [address] [facility] [phone]

## 2023-07-18 ENCOUNTER — TRANSITIONAL CARE MANAGEMENT (OUTPATIENT)
Dept: FAMILY MEDICINE CLINIC | Facility: CLINIC | Age: 58
End: 2023-07-18

## 2023-07-21 PROCEDURE — 88304 TISSUE EXAM BY PATHOLOGIST: CPT | Performed by: STUDENT IN AN ORGANIZED HEALTH CARE EDUCATION/TRAINING PROGRAM

## 2023-07-24 ENCOUNTER — OFFICE VISIT (OUTPATIENT)
Dept: SURGERY | Facility: CLINIC | Age: 58
End: 2023-07-24

## 2023-07-24 VITALS — HEIGHT: 59 IN | BODY MASS INDEX: 36.61 KG/M2 | WEIGHT: 181.6 LBS | TEMPERATURE: 97.1 F

## 2023-07-24 DIAGNOSIS — K80.50 CHOLEDOCHOLITHIASIS: Primary | ICD-10-CM

## 2023-07-24 PROCEDURE — 99024 POSTOP FOLLOW-UP VISIT: CPT | Performed by: SURGERY

## 2023-07-24 NOTE — ASSESSMENT & PLAN NOTE
Amina Boyce is a 62 y.o. female with cholelithiasis and choledocholithiasis s/p ERCP w/sphincterotomy on 7/13 and s/p laparoscopic cholecystectomy on 7/14  - incisions well healing  - pt back to baseline, without pain  - pt educated on recovery post op and instructed to minimize weightbearing to 20lbs up until a month post op   - pt educated on return precautions include fever, new onset pain, drainage from incision site  - pt will follow up as needed.

## 2023-07-24 NOTE — PROGRESS NOTES
Office Visit - General Surgery  Mily Ortiz MRN: [id number]  Encounter: [id number]    Assessment and Plan  Problem List Items Addressed This Visit        Digestive    Choledocholithiasis - Primary     Mily Ortiz is a 62 y.o. female with cholelithiasis and choledocholithiasis s/p ERCP w/sphincterotomy on 7/13 and s/p laparoscopic cholecystectomy on 7/14  - incisions well healing  - pt back to baseline, without pain  - pt educated on recovery post op and instructed to minimize weightbearing to 20lbs up until a month post op   - pt educated on return precautions include fever, new onset pain, drainage from incision site  - pt will follow up as needed. Chief Complaint:  Mily Ortiz is a 62 y.o. female who presents for Post-op (P/o lap zacarias.)    Subjective   Pt is a 61year old female presenting for a post op check s/p lap zacarias on 07/14. Pt is doing well this morning. She reports her pain is under control. Able to ambulate at baseline. Able to void appropriately and independently. Able to pass flatus and have bowel movements. Afebrile.     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   • BREAST LUMPECTOMY Right 2/25/2020    phyllodes tumor   • BREAST SURGERY     • CHOLECYSTECTOMY LAPAROSCOPIC N/A 7/14/2023    Procedure: CHOLECYSTECTOMY LAPAROSCOPIC;  Surgeon: Diego Srivastava DO;  Location: [facility];  Service: General   • EAR SURGERY Left     ear drum repair   • MAMMO NEEDLE LOCALIZATION RIGHT (ALL INC) Right 1/14/2020   •  [address] INCL FLUOR GDNCE DX W/CELL WASHG SPX N/A 10/19/2018    Procedure: BRONCHOSCOPY RIGID;  Surgeon: Ulises Vidal MD;  Location: [facility];  Service: ENT   •  [place] EXC ANDERSON&/STRPG CORDS/EPIGL MCRSCP/TLSCP N/A 10/19/2018    Procedure: EXCISION LARYNGEAL SCAR; SUPRAGLOTTOPLASTY  POSSIBLE JET VENTILATION; CO2 LASER; COBLATOR;  Surgeon: Ulises Vidal MD;  [facility];  Service: ENT   • [place] LARYNGOSCOPY DIRECT OPERATIVE W/BIOPSY N/A 10/19/2018    Procedure: MICRO DIRECT LARYNGOSCOPY;  Surgeon: Ulises Vidal MD;  [facility];  Service: ENT   • [place] GUIDED BREAST BIOPSY RIGHT COMPLETE Right 11/25/2019       Family History   Problem Relation Age of Onset   • Lung cancer Mother 76   • Liver cancer Brother    • No Known Problems Father    • No Known Problems Sister    • No Known Problems Daughter    • No Known Problems Sister    • No Known Problems Maternal Grandmother    • No Known Problems Maternal Grandfather    • No Known Problems Paternal Grandmother        Social History     Tobacco Use   • Smoking status: Never   • Smokeless tobacco: Never   Vaping Use   • Vaping Use: Never used   Substance Use Topics   • Alcohol use: No   • Drug use: No        Medications  Current Outpatient Medications on File Prior to Visit   Medication Sig Dispense Refill   • cetirizine (ZyrTEC) 10 mg tablet Take 1 tablet (10 mg total) by mouth daily 90 tablet 2   • fluticasone (FLONASE) 50 mcg/act nasal spray 1 spray into each nostril daily 9.9 mL 2   • hydrocortisone 1 % cream Apply topically 4 (four) times a day as needed for irritation or rash (ourter ear irritation) 30 g 0   • oxyCODONE (Roxicodone) 5 immediate release tablet Take 1 tablet (5 mg total) by mouth every 4 (four) hours as needed for moderate pain for up to 6 doses Max Daily Amount: 30 mg 6 tablet 0   • pantoprazole (PROTONIX) 40 mg tablet Take 1 tablet (40 mg total) by mouth daily for 14 days 14 tablet 0     No current facility-administered medications on file prior to visit.        Allergies  Allergies Allergen Reactions   • Pollen Extract Allergic Rhinitis       Review of Systems   All other systems reviewed and are negative. Objective  Vitals:    07/24/23 1128   Temp: (!) 97.1 °F (36.2 °C)       Physical Exam  Vitals reviewed. Constitutional:       Appearance: Normal appearance. She is normal weight. HENT:      Head: Normocephalic and atraumatic. Cardiovascular:      Rate and Rhythm: Normal rate and regular rhythm. Pulmonary:      Effort: Pulmonary effort is normal.      Breath sounds: Normal breath sounds. Abdominal:      General: A surgical scar is present. Bowel sounds are normal. There is no distension. Palpations: Abdomen is soft. Tenderness: There is no abdominal tenderness. Hernia: No hernia is present. Comments: Four surgical incisions present from lap zacarias. All clean dry intact and well healing. Neurological:      Mental Status: She is alert.

## 2023-07-27 NOTE — QUICK NOTE
I received paperwork from the [facility] Department regarding patient's Hepatitis B results. Patient was admitted to the hospital and diagnosed with choledocholithiasis. Discussed results with gastroenterology. They feel that the positive IgM is either a false positive or indicates an early infection. Patient's elevated LFTs are likely secondary to the choledocholithiasis and were improving at the time of discharge. Patient will need to have repeat labs including an acute hepatitis panel and hep B quantitative DNA level to confirm. Labs ordered by gastroenterology SHIRLEY. The health department forms were completed and faxed back to the [facility]. Called patient to inform her of the results and need for repeat labs. No answer and unable to leave voicemail as voicemail box has not yet been set up. I called patient's  and left a voicemail advising them to contact the ED to discuss test results.

## 2023-07-28 DIAGNOSIS — R76.8 HEPATITIS B ANTIBODY POSITIVE: Primary | ICD-10-CM

## 2023-08-04 ENCOUNTER — APPOINTMENT (OUTPATIENT)
Dept: LAB | Facility: CLINIC | Age: 58
End: 2023-08-04
Payer: COMMERCIAL

## 2023-08-04 DIAGNOSIS — R76.8 HEPATITIS B ANTIBODY POSITIVE: ICD-10-CM

## 2023-08-04 PROCEDURE — 87340 HEPATITIS B SURFACE AG IA: CPT

## 2023-08-04 PROCEDURE — 86706 HEP B SURFACE ANTIBODY: CPT

## 2023-08-04 PROCEDURE — 86704 HEP B CORE ANTIBODY TOTAL: CPT

## 2023-08-04 PROCEDURE — 87517 HEPATITIS B DNA QUANT: CPT

## 2023-08-04 PROCEDURE — 86705 HEP B CORE ANTIBODY IGM: CPT

## 2023-08-04 PROCEDURE — 87350 HEPATITIS BE AG IA: CPT

## 2023-08-04 PROCEDURE — 36415 COLL VENOUS BLD VENIPUNCTURE: CPT

## 2023-08-05 LAB
HBV CORE AB SER QL: REACTIVE
HBV CORE AB SER QL: REACTIVE
HBV CORE IGM SER QL: REACTIVE
HBV SURFACE AB SER-ACNC: >500 MIU/ML
HBV SURFACE AG SER QL: NORMAL

## 2023-08-06 LAB — HBV E AG SERPL QL IA: NEGATIVE

## 2023-08-07 LAB — HBV DNA SERPL NAA+PROBE-ACNC: NOT DETECTED [IU]/ML

## 2023-08-10 DIAGNOSIS — R76.8 HEPATITIS B ANTIBODY POSITIVE: Primary | ICD-10-CM

## 2023-08-30 ENCOUNTER — OFFICE VISIT (OUTPATIENT)
Dept: FAMILY MEDICINE CLINIC | Facility: CLINIC | Age: 58
End: 2023-08-30
Payer: COMMERCIAL

## 2023-08-30 VITALS
OXYGEN SATURATION: 97 % | TEMPERATURE: 97.6 F | DIASTOLIC BLOOD PRESSURE: 80 MMHG | RESPIRATION RATE: 21 BRPM | BODY MASS INDEX: 37.5 KG/M2 | SYSTOLIC BLOOD PRESSURE: 134 MMHG | HEIGHT: 59 IN | HEART RATE: 80 BPM | WEIGHT: 186 LBS

## 2023-08-30 DIAGNOSIS — R14.0 BLOATING: ICD-10-CM

## 2023-08-30 DIAGNOSIS — R76.8 HEPATITIS B ANTIBODY POSITIVE: Primary | ICD-10-CM

## 2023-08-30 PROCEDURE — 99213 OFFICE O/P EST LOW 20 MIN: CPT | Performed by: FAMILY MEDICINE

## 2023-08-30 NOTE — PROGRESS NOTES
[facility] Office visit    Assessment/Plan:     1. Hepatitis B antibody positive  Assessment & Plan:  Patient presents to follow up on lab testing   Hep B antibody positive   Hep B antigen negative     · Likely a previous exposure to hepatitis B, although there is no detectable virus and it does not appear that she has an active infection at this time.    · To confirm she did not recently  hepatitis B that could require further follow-up, recheck the same lab work again in 3 to 6 months      2. Bloating  Assessment & Plan:  Bloating since laparoscopic cholecystectomy in July   Denies diarrhea, constipation     · Follow up with GI   · Potential H pylori infection           No follow-ups on file. Subjective:   DWIGHT Warner is a 62 y.o. female who presents to the office to follow up on her blood test results. She also reports some bloating since her laparoscopic cholecystectomy in July 2023. Review of Systems   Constitutional: Negative for chills, diaphoresis and fever. Respiratory: Negative for cough, chest tightness and shortness of breath. Cardiovascular: Negative for chest pain and palpitations. Gastrointestinal: Positive for abdominal pain (since lap zacarias, intermittent). Negative for constipation, diarrhea, nausea and vomiting. Neurological: Negative for dizziness, light-headedness and headaches. All other systems reviewed and are negative. Objective:     /80 (BP Location: Left arm, Patient Position: Sitting, Cuff Size: Large)   Pulse 80   Temp 97.6 °F (36.4 °C) (Temporal)   Resp 21   Ht 4' 11" (1.499 m)   Wt 84.4 kg (186 lb)   SpO2 97%   BMI 37.57 kg/m²      Physical Exam  Constitutional:       Appearance: She is obese. HENT:      Head: Normocephalic and atraumatic. Eyes:      Extraocular Movements: Extraocular movements intact.       Conjunctiva/sclera: Conjunctivae normal.   Cardiovascular:      Rate and Rhythm: Normal rate and regular rhythm. Heart sounds: Normal heart sounds. No murmur heard. Pulmonary:      Effort: Pulmonary effort is normal. No respiratory distress. Breath sounds: Normal breath sounds. Abdominal:      Palpations: Abdomen is soft. Tenderness: There is no abdominal tenderness. Comments: Surgical scars from laparoscopic cholecystectomy, healing appropriately    Musculoskeletal:      Right lower leg: No edema. Left lower leg: No edema. Skin:     General: Skin is warm. Neurological:      Mental Status: She is alert and oriented to person, place, and time.    Psychiatric:         Mood and Affect: Mood normal.         Behavior: Behavior normal.          ** Please Note: This note has been constructed using a voice recognition system Shelly Elliott DO  08/30/23  12:35 PM

## 2023-08-30 NOTE — ASSESSMENT & PLAN NOTE
Patient presents to follow up on lab testing   Hep B antibody positive   Hep B antigen negative     · Likely a previous exposure to hepatitis B, although there is no detectable virus and it does not appear that she has an active infection at this time.    · To confirm she did not recently  hepatitis B that could require further follow-up, recheck the same lab work again in 3 to 6 months

## 2023-08-30 NOTE — ASSESSMENT & PLAN NOTE
Bloating since laparoscopic cholecystectomy in July   Denies diarrhea, constipation     · Follow up with GI   · Potential H pylori infection

## 2023-09-14 ENCOUNTER — TELEPHONE (OUTPATIENT)
Age: 58
End: 2023-09-14

## 2023-09-14 NOTE — TELEPHONE ENCOUNTER
Spoke with Tiffani Quintero from [facility] Dept. All questions and concerns regarding Hep B status addressed.

## 2023-09-14 NOTE — TELEPHONE ENCOUNTER
Patients GI provider:  Dr. Medardo Guerrier    Number to return call: [phone]     Reason for call: Tran nurse from [facility] called requesting to speak with someone regarding recent labs status    Scheduled procedure/appointment date if applicable: Apt/procedure n/a

## 2023-10-21 ENCOUNTER — APPOINTMENT (OUTPATIENT)
Dept: LAB | Facility: CLINIC | Age: 58
End: 2023-10-21
Payer: COMMERCIAL

## 2023-10-21 DIAGNOSIS — R76.8 HEPATITIS B ANTIBODY POSITIVE: ICD-10-CM

## 2023-10-21 PROCEDURE — 86706 HEP B SURFACE ANTIBODY: CPT

## 2023-10-21 PROCEDURE — 86705 HEP B CORE ANTIBODY IGM: CPT

## 2023-10-21 PROCEDURE — 87340 HEPATITIS B SURFACE AG IA: CPT

## 2023-10-21 PROCEDURE — 36415 COLL VENOUS BLD VENIPUNCTURE: CPT

## 2023-10-21 PROCEDURE — 86704 HEP B CORE ANTIBODY TOTAL: CPT

## 2023-10-21 PROCEDURE — 87517 HEPATITIS B DNA QUANT: CPT

## 2023-10-22 LAB
HBV CORE AB SER QL: REACTIVE
HBV CORE AB SER QL: REACTIVE
HBV CORE IGM SER QL: REACTIVE
HBV DNA SERPL NAA+PROBE-ACNC: NOT DETECTED [IU]/ML
HBV SURFACE AB SER-ACNC: >500 MIU/ML
HBV SURFACE AG SER QL: NORMAL

## 2023-10-25 DIAGNOSIS — R76.8 HEPATITIS B CORE ANTIBODY POSITIVE: Primary | ICD-10-CM

## 2023-10-26 ENCOUNTER — TELEPHONE (OUTPATIENT)
Dept: GASTROENTEROLOGY | Facility: CLINIC | Age: 58
End: 2023-10-26

## 2023-10-26 NOTE — TELEPHONE ENCOUNTER
----- Message from Gadiel Dunn sent at 10/25/2023  3:09 PM EDT -----    ----- Message -----  From: Juan A Posadas PA-C  Sent: 10/25/2023  12:58 PM EDT  To: [facility]    Please advise patient, hepatitis B antibodies are still coming out positive however the test for actual virus (viral load by DNA) is still negative. The antibodies can be known to persist for several months after initial exposure and infection, she most likely does not have chronic hepatitis B but we will check labs again in 3-4 months to continue assurance of absence of developing hepatitis B infection. I'll place orders now, thank you.

## 2023-10-30 ENCOUNTER — TELEPHONE (OUTPATIENT)
Dept: FAMILY MEDICINE CLINIC | Facility: CLINIC | Age: 58
End: 2023-10-30

## 2023-10-30 NOTE — TELEPHONE ENCOUNTER
Copy of Immunization has been received by [facility] pharmacy ([facility]). I have entered immunization information into patients chart. A copy has been attached to this encounter.

## 2023-11-24 ENCOUNTER — HOSPITAL ENCOUNTER (OUTPATIENT)
Dept: MAMMOGRAPHY | Facility: HOSPITAL | Age: 58
Discharge: HOME | End: 2023-11-24
Payer: COMMERCIAL

## 2023-11-24 VITALS — WEIGHT: 186 LBS | HEIGHT: 59 IN | BODY MASS INDEX: 37.5 KG/M2

## 2023-11-24 DIAGNOSIS — Z12.31 VISIT FOR SCREENING MAMMOGRAM: ICD-10-CM

## 2023-11-24 PROCEDURE — 77067 SCR MAMMO BI INCL CAD: CPT

## 2023-11-24 PROCEDURE — 77063 BREAST TOMOSYNTHESIS BI: CPT

## 2023-11-30 ENCOUNTER — DOCUMENTATION (OUTPATIENT)
Dept: SURGICAL ONCOLOGY | Facility: CLINIC | Age: 58
End: 2023-11-30

## 2023-11-30 NOTE — PROGRESS NOTES
The patient and her  arrived at the [facility] waiting room unscheduled. They thought the patient had an appointment today with Quyen Kumari. Spoke to the patient and her  and explained that when the patient last saw Quyen Kumari in 12/2022, she had recommended the patient return to routine screening with her PCP. The patient stated that she had a mammogram last week which came back normal. They requested that her next mammogram be ordered and scheduled at the current time while they were here. Explained that since the patient no longer needed to follow with Dr. Yolanda Neville, they should contact her PCP for the mammogram order. The patient and her  verbalized understanding and were in agreement with this plan.

## 2023-12-11 ENCOUNTER — OFFICE VISIT (OUTPATIENT)
Dept: FAMILY MEDICINE CLINIC | Facility: CLINIC | Age: 58
End: 2023-12-11
Payer: COMMERCIAL

## 2023-12-11 VITALS
WEIGHT: 188.8 LBS | OXYGEN SATURATION: 97 % | HEIGHT: 59 IN | SYSTOLIC BLOOD PRESSURE: 138 MMHG | HEART RATE: 85 BPM | BODY MASS INDEX: 38.06 KG/M2 | DIASTOLIC BLOOD PRESSURE: 76 MMHG

## 2023-12-11 DIAGNOSIS — D17.22 LIPOMA OF LEFT UPPER EXTREMITY: ICD-10-CM

## 2023-12-11 DIAGNOSIS — Z11.4 SCREENING FOR HIV (HUMAN IMMUNODEFICIENCY VIRUS): ICD-10-CM

## 2023-12-11 DIAGNOSIS — R76.8 HEPATITIS B ANTIBODY POSITIVE: ICD-10-CM

## 2023-12-11 DIAGNOSIS — Z12.11 SCREENING FOR COLORECTAL CANCER: ICD-10-CM

## 2023-12-11 DIAGNOSIS — Z00.00 ANNUAL PHYSICAL EXAM: Primary | ICD-10-CM

## 2023-12-11 DIAGNOSIS — Z12.12 SCREENING FOR COLORECTAL CANCER: ICD-10-CM

## 2023-12-11 PROCEDURE — 99396 PREV VISIT EST AGE 40-64: CPT | Performed by: FAMILY MEDICINE

## 2023-12-11 NOTE — PROGRESS NOTES
[id number]    NAME: Edwige Guzman  AGE: 62 y.o. SEX: female  : 1965     DATE: 2023     Assessment and Plan:     1. Annual physical exam    2. Screening for colorectal cancer  -     Ambulatory referral to Gastroenterology; Future; Expected date: 2023    3. Screening for HIV (human immunodeficiency virus)  -     HIV 1/2 AG/AB w Reflex [facility] for 2 yr old and above; Future    4. Lipoma of left upper extremity  Assessment & Plan:  Swollen lump on left upper arm, tender to touch   Likely lipoma     May resolve on its own   May improve with weight loss   Advised patient to monitor, RTO if it increases in size or becomes painful       5. Hepatitis B antibody positive  Assessment & Plan:  History of positive Hep B antibody   Recently received vaccine   Following with GI/Hepatology, recently recommended repeat levels in 2024    Continue to follow GI/Hepatology recommendations   Can hold off on next Hep B vaccine if advised by GI/Hepatology         Immunizations and preventive care screenings were discussed with patient today. Appropriate education was printed on patient's after visit summary. Counseling:  Alcohol/drug use: discussed moderation in alcohol intake, the recommendations for healthy alcohol use, and avoidance of illicit drug use. Dental Health: discussed importance of regular tooth brushing, flossing, and dental visits. Injury prevention: discussed safety/seat belts, safety helmets, smoke detectors, carbon dioxide detectors, and smoking near bedding or upholstery. Sexual health: discussed sexually transmitted diseases, partner selection, use of condoms, avoidance of unintended pregnancy, and contraceptive alternatives. Exercise: the importance of regular exercise/physical activity was discussed. Recommend exercise 3-5 times per week for at least 30 minutes. BMI Counseling: Body mass index is 38.13 kg/m². The BMI is above normal. Nutrition recommendations include encouraging healthy choices of fruits and vegetables, moderation in carbohydrate intake and reducing intake of cholesterol. Exercise recommendations include moderate physical activity 150 minutes/week. Rationale for BMI follow-up plan is due to patient being overweight or obese. Depression Screening and Follow-up Plan: Patient was screened for depression during today's encounter. They screened negative with a PHQ-2 score of 1. No follow-ups on file. Chief Complaint:     Chief Complaint   Patient presents with    Physical Exam     Swollen/puffy inside of left arm. History of Present Illness:     Adult Annual Physical   Patient here for a comprehensive physical exam. The patient reports a lump in her left upper arm. She states it is painful to the touch, 5/10 in pain. The pain is not constant. She first noticed the lump a few weeks ago. Diet and Physical Activity  Diet/Nutrition: well balanced diet. Exercise:  trying to start bicycling again . Depression Screening  PHQ-2/9 Depression Screening    Little interest or pleasure in doing things: 0 - not at all  Feeling down, depressed, or hopeless: 1 - several days  Trouble falling or staying asleep, or sleeping too much: 2 - more than half the days  Feeling tired or having little energy: 3 - nearly every day  Poor appetite or overeatin - more than half the days  Feeling bad about yourself - or that you are a failure or have let yourself or your family down: 1 - several days  Trouble concentrating on things, such as reading the newspaper or watching television: 1 - several days  Moving or speaking so slowly that other people could have noticed.  Or the opposite - being so fidgety or restless that you have been moving around a lot more than usual: 1 - several days  Thoughts that you would be better off dead, or of hurting yourself in some way: 0 - not at all  PHQ-2 Score: 1  PHQ-2 Interpretation: Negative depression screen  PHQ-9 Score: 11   PHQ-9 Interpretation: Moderate depression          General Health  Sleep: gets 4-6 hours of sleep on average. Hearing:  perforated ear drums, follows with ENT . Vision: no vision problems and goes for regular eye exams. Dental: regular dental visits, brushes teeth twice daily, and flosses teeth occasionally. /GYN Health  Follows with gynecology? No, gets annual gyn visits at   [facility]   Patient is: postmenopausal  Last menstrual period: does not recall   Contraceptive method:  is not sexually active . Advanced Care Planning  Do you have an advanced directive? no  Do you have a durable medical power of ? no     Review of Systems:     Review of Systems   Constitutional:  Negative for chills, diaphoresis, fatigue and fever. HENT:  Negative for congestion, rhinorrhea and sore throat. Eyes:  Negative for pain, redness and itching. Respiratory:  Negative for cough, chest tightness and shortness of breath. Cardiovascular:  Negative for chest pain and palpitations. Gastrointestinal:  Negative for abdominal pain, constipation, diarrhea, nausea and vomiting. Genitourinary:  Negative for difficulty urinating, dysuria, hematuria and vaginal bleeding. Musculoskeletal:  Negative for arthralgias, back pain and myalgias. Skin:  Negative for color change, pallor and rash. Neurological:  Negative for dizziness, light-headedness and headaches.       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    BREAST SURGERY      CHOLECYSTECTOMY LAPAROSCOPIC N/A 07/14/2023    Procedure: CHOLECYSTECTOMY LAPAROSCOPIC;  Surgeon: Desiree Prater DO;  [facility];  Service: General    EAR SURGERY Left     ear drum repair    MAMMO NEEDLE LOCALIZATION RIGHT (ALL INC) Right 01/14/2020     [address] INCL FLUOR GDNCE DX W/CELL WASHG SPX N/A 10/19/2018    Procedure: BRONCHOSCOPY RIGID;  Surgeon: Reinaldo Dia MD;  Location: [facility];  Service: ENT     [place] EXC ANDERSON&/STRPG CORDS/EPIGL MCRSCP/TLSCP N/A 10/19/2018    Procedure: EXCISION LARYNGEAL SCAR; SUPRAGLOTTOPLASTY  POSSIBLE JET VENTILATION; CO2 LASER; COBLATOR;  Surgeon: Reinaldo Dia MD;  [facility];  Service: ENT    [place] LARYNGOSCOPY DIRECT OPERATIVE W/BIOPSY N/A 10/19/2018    Procedure: MICRO DIRECT LARYNGOSCOPY;  Surgeon: Reinaldo Dia MD;  [facility];  Service: ENT    US GUIDED BREAST BIOPSY RIGHT COMPLETE Right 11/25/2019      Social History:     Social History     Socioeconomic History    Marital status: /Civil Union     Spouse name: None    Number of children: None    Years of education: None    Highest education level: None   Occupational History    None   Tobacco Use    Smoking status: Never    Smokeless tobacco: Never   Vaping Use    Vaping status: Never Used   Substance and Sexual Activity    Alcohol use: No    Drug use: No    Sexual activity: Never     Birth control/protection: Post-menopausal   Other Topics Concern    None   Social History Narrative    None     Social Determinants of Health     Financial Resource Strain: Not on file   Food Insecurity: Not on file   Transportation Needs: Not on file   Physical Activity: Not on file   Stress: Not on file   Social Connections: Not on file   Intimate Partner Violence: Not on file   Housing Stability: Not on file      Family History:     Family History   Problem Relation Age of Onset    Lung cancer Mother 76    No Known Problems Father     No Known Problems Sister     No Known Problems Sister     No Known Problems Daughter     No Known Problems Maternal Grandmother     No Known Problems Maternal Grandfather     No Known Problems Paternal Grandmother     Liver cancer Brother     No Known Problems Maternal Aunt     No Known Problems Paternal Aunt     No Known Problems Paternal Aunt       Current Medications:     Current Outpatient Medications   Medication Sig Dispense Refill    cetirizine (ZyrTEC) 10 mg tablet Take 1 tablet (10 mg total) by mouth daily (Patient not taking: Reported on 8/30/2023) 90 tablet 2    fluticasone (FLONASE) 50 mcg/act nasal spray 1 spray into each nostril daily (Patient not taking: Reported on 8/30/2023) 9.9 mL 2    hydrocortisone 1 % cream Apply topically 4 (four) times a day as needed for irritation or rash (ourter ear irritation) (Patient not taking: Reported on 8/30/2023) 30 g 0    oxyCODONE (Roxicodone) 5 immediate release tablet Take 1 tablet (5 mg total) by mouth every 4 (four) hours as needed for moderate pain for up to 6 doses Max Daily Amount: 30 mg (Patient not taking: Reported on 8/30/2023) 6 tablet 0    pantoprazole (PROTONIX) 40 mg tablet Take 1 tablet (40 mg total) by mouth daily for 14 days 14 tablet 0     No current facility-administered medications for this visit. Allergies: Allergies   Allergen Reactions    Pollen Extract Allergic Rhinitis      Physical Exam:     /76 (BP Location: Left arm, Patient Position: Sitting, Cuff Size: Large)   Pulse 85   Ht 4' 11" (1.499 m)   Wt 85.6 kg (188 lb 12.8 oz)   SpO2 97%   BMI 38.13 kg/m²     Physical Exam  Vitals and nursing note reviewed. Constitutional:       General: She is not in acute distress. Appearance: She is well-developed. She is obese. HENT:      Head: Normocephalic and atraumatic.       Right Ear: Tympanic membrane, ear canal and external ear normal.      Left Ear: Tympanic membrane, ear canal and external ear normal.      Nose: Nose normal.      Mouth/Throat:      Mouth: Mucous membranes are moist.      Pharynx: Oropharynx is clear. Eyes:      Extraocular Movements: Extraocular movements intact. Conjunctiva/sclera: Conjunctivae normal.      Pupils: Pupils are equal, round, and reactive to light. Cardiovascular:      Rate and Rhythm: Normal rate and regular rhythm. Heart sounds: Normal heart sounds. No murmur heard. Pulmonary:      Effort: Pulmonary effort is normal. No respiratory distress. Breath sounds: Normal breath sounds. Abdominal:      General: Bowel sounds are normal.      Palpations: Abdomen is soft. Tenderness: There is no abdominal tenderness. Musculoskeletal:         General: No swelling. Left upper arm: Swelling (lipoma) present. Cervical back: Normal range of motion and neck supple. Skin:     General: Skin is warm and dry. Capillary Refill: Capillary refill takes less than 2 seconds. Neurological:      Mental Status: She is alert and oriented to person, place, and time. Cranial Nerves: No cranial nerve deficit. Motor: No weakness.       Gait: Gait normal.   Psychiatric:         Mood and Affect: Mood normal.         Behavior: Behavior normal.          Morgan Chavira DO  [facility]

## 2023-12-16 PROBLEM — D17.22 LIPOMA OF LEFT UPPER EXTREMITY: Status: ACTIVE | Noted: 2023-12-16

## 2023-12-17 NOTE — ASSESSMENT & PLAN NOTE
History of positive Hep B antibody   Recently received vaccine   Following with GI/Hepatology, recently recommended repeat levels in Jan 2024    Continue to follow GI/Hepatology recommendations   Can hold off on next Hep B vaccine if advised by GI/Hepatology

## 2023-12-17 NOTE — ASSESSMENT & PLAN NOTE
Swollen lump on left upper arm, tender to touch   Likely lipoma     May resolve on its own   May improve with weight loss   Advised patient to monitor, RTO if it increases in size or becomes painful

## 2024-02-21 PROBLEM — R05.9 COUGH: Status: RESOLVED | Noted: 2019-03-03 | Resolved: 2024-02-21

## 2025-01-27 ENCOUNTER — OFFICE VISIT (OUTPATIENT)
Age: 60
End: 2025-01-27

## 2025-01-27 VITALS
OXYGEN SATURATION: 97 % | BODY MASS INDEX: 40.92 KG/M2 | HEIGHT: 59 IN | HEART RATE: 79 BPM | WEIGHT: 203 LBS | TEMPERATURE: 99 F | SYSTOLIC BLOOD PRESSURE: 126 MMHG | DIASTOLIC BLOOD PRESSURE: 80 MMHG

## 2025-01-27 DIAGNOSIS — H92.02 EAR DISCOMFORT, LEFT: ICD-10-CM

## 2025-01-27 DIAGNOSIS — Z11.4 SCREENING FOR HIV (HUMAN IMMUNODEFICIENCY VIRUS): ICD-10-CM

## 2025-01-27 DIAGNOSIS — Z12.12 SCREENING FOR COLORECTAL CANCER: ICD-10-CM

## 2025-01-27 DIAGNOSIS — E66.813 CLASS 3 SEVERE OBESITY WITHOUT SERIOUS COMORBIDITY WITH BODY MASS INDEX (BMI) OF 40.0 TO 44.9 IN ADULT, UNSPECIFIED OBESITY TYPE (HCC): ICD-10-CM

## 2025-01-27 DIAGNOSIS — E66.01 CLASS 3 SEVERE OBESITY WITHOUT SERIOUS COMORBIDITY WITH BODY MASS INDEX (BMI) OF 40.0 TO 44.9 IN ADULT, UNSPECIFIED OBESITY TYPE (HCC): ICD-10-CM

## 2025-01-27 DIAGNOSIS — R76.8 HEPATITIS B ANTIBODY POSITIVE: ICD-10-CM

## 2025-01-27 DIAGNOSIS — Z00.00 ANNUAL PHYSICAL EXAM: Primary | ICD-10-CM

## 2025-01-27 DIAGNOSIS — K92.1 BLOOD IN STOOL: ICD-10-CM

## 2025-01-27 DIAGNOSIS — Z12.11 SCREENING FOR COLORECTAL CANCER: ICD-10-CM

## 2025-01-27 DIAGNOSIS — Z12.31 ENCOUNTER FOR SCREENING MAMMOGRAM FOR BREAST CANCER: ICD-10-CM

## 2025-01-27 DIAGNOSIS — Z23 ENCOUNTER FOR IMMUNIZATION: ICD-10-CM

## 2025-01-27 PROCEDURE — 90471 IMMUNIZATION ADMIN: CPT | Performed by: FAMILY MEDICINE

## 2025-01-27 PROCEDURE — 90715 TDAP VACCINE 7 YRS/> IM: CPT | Performed by: FAMILY MEDICINE

## 2025-01-27 PROCEDURE — 99386 PREV VISIT NEW AGE 40-64: CPT | Performed by: FAMILY MEDICINE

## 2025-01-27 NOTE — PATIENT INSTRUCTIONS
"Ear wax care kit - with oil to hydrate ear. Do not place into ear canal, only near opening of ear.   Patient Education     Routine physical for adults   The Basics   Written by the doctors and editors at [facility]   What is a physical? -- A physical is a routine visit, or \"check-up,\" with your doctor. You might also hear it called a \"wellness visit\" or \"preventive visit.\"  During each visit, the doctor will:   Ask about your physical and mental health   Ask about your habits, behaviors, and lifestyle   Do an exam   Give you vaccines if needed   Talk to you about any medicines you take   Give advice about your health   Answer your questions  Getting regular check-ups is an important part of taking care of your health. It can help your doctor find and treat any problems you have. But it's also important for preventing health problems.  A routine physical is different from a \"sick visit.\" A sick visit is when you see a doctor because of a health concern or problem. Since physicals are scheduled ahead of time, you can think about what you want to ask the doctor.  How often should I get a physical? -- It depends on your age and health. In general, for people age 21 years and older:   If you are younger than 50 years, you might be able to get a physical every 3 years.   If you are 50 years or older, your doctor might recommend a physical every year.  If you have an ongoing health condition, like diabetes or high blood pressure, your doctor will probably want to see you more often.  What happens during a physical? -- In general, each visit will include:   Physical exam - The doctor or nurse will check your height, weight, heart rate, and blood pressure. They will also look at your eyes and ears. They will ask about how you are feeling and whether you have any symptoms that bother you.   Medicines - It's a good idea to bring a list of all the medicines you take to each doctor visit. Your doctor will talk to you about your " "medicines and answer any questions. Tell them if you are having any side effects that bother you. You should also tell them if you are having trouble paying for any of your medicines.   Habits and behaviors - This includes:   Your diet   Your exercise habits   Whether you smoke, drink alcohol, or use drugs   Whether you are sexually active   Whether you feel safe at home  Your doctor will talk to you about things you can do to improve your health and lower your risk of health problems. They will also offer help and support. For example, if you want to quit smoking, they can give you advice and might prescribe medicines. If you want to improve your diet or get more physical activity, they can help you with this, too.   Lab tests, if needed - The tests you get will depend on your age and situation. For example, your doctor might want to check your:   Cholesterol   Blood sugar   Iron level   Vaccines - The recommended vaccines will depend on your age, health, and what vaccines you already had. Vaccines are very important because they can prevent certain serious or deadly infections.   Discussion of screening - \"Screening\" means checking for diseases or other health problems before they cause symptoms. Your doctor can recommend screening based on your age, risk, and preferences. This might include tests to check for:   Cancer, such as breast, prostate, cervical, ovarian, colorectal, prostate, lung, or skin cancer   Sexually transmitted infections, such as chlamydia and gonorrhea   Mental health conditions like depression and anxiety  Your doctor will talk to you about the different types of screening tests. They can help you decide which screenings to have. They can also explain what the results might mean.   Answering questions - The physical is a good time to ask the doctor or nurse questions about your health. If needed, they can refer you to other doctors or specialists, too.  Adults older than 65 years often need " other care, too. As you get older, your doctor will talk to you about:   How to prevent falling at home   Hearing or vision tests   Memory testing   How to take your medicines safely   Making sure that you have the help and support you need at home  All topics are updated as new evidence becomes available and our peer review process is complete.  This topic retrieved from [employer] on: May 02, 2024.  Topic 687629 Version 1.0  Release: 32.4.3 - C32.122  © 2024 UpToDate, Inc. and/or its affiliates. All rights reserved.  Consumer Information Use and Disclaimer   Disclaimer: This generalized information is a limited summary of diagnosis, treatment, and/or medication information. It is not meant to be comprehensive and should be used as a tool to help the user understand and/or assess potential diagnostic and treatment options. It does NOT include all information about conditions, treatments, medications, side effects, or risks that may apply to a specific patient. It is not intended to be medical advice or a substitute for the medical advice, diagnosis, or treatment of a health care provider based on the health care provider's examination and assessment of a patient's specific and unique circumstances. Patients must speak with a health care provider for complete information about their health, medical questions, and treatment options, including any risks or benefits regarding use of medications. This information does not endorse any treatments or medications as safe, effective, or approved for treating a specific patient. UpToDate, Inc. and its affiliates disclaim any warranty or liability relating to this information or the use thereof.The use of this information is governed by the Terms of Use, available at https://www.wolterskluwer.com/en/know/clinical-effectiveness-terms. 2024© UpToDate, Inc. and its affiliates and/or licensors. All rights reserved.  Copyright   © 2024 UpToDate, Inc. and/or its affiliates. All rights  reserved.

## 2025-01-27 NOTE — PROGRESS NOTES
Adult Annual Physical  Name: Mery Baker      : 1965      MRN: [id number]  Encounter Provider: Iman Moreno DO  Encounter Date: 2025   Encounter department: [facility]    Assessment & Plan  Annual physical exam  Due for pap smear, will schedule appt   All other screenings ordered during this visit        Blood in stool  Patient notes history of dark-colored stools and noticing blood when wiping after a bowel movement in the past few weeks  Patient has been hesitant to get a colonoscopy in the past, however now is concerned.    CBC to evaluate for any blood loss  GI referral for colonoscopy ordered  Orders:    Ambulatory referral to Gastroenterology; Future    CBC and differential; Future    Ear discomfort, left  Patient notes pain in left ear, has been cleaning her air pods after use, using hydrocortisone cream with good relief  On physical exam, small pimple noted at beginning of left ear canal    Can continue hydrocortisone cream  Can also use mineral oil to soften pimple  Continue to clean AirPods after use.  Should consider over-the-head headphones versus in-ear buds.       Screening for colorectal cancer    Orders:    Ambulatory referral to Gastroenterology; Future    Encounter for screening mammogram for breast cancer    Orders:    Mammo screening bilateral w 3d and cad; Future    Screening for HIV (human immunodeficiency virus)    Orders:    HIV 1/2 AG/AB w Reflex [facility] for 2 yr old and above; Future    Encounter for immunization    Orders:    TDAP VACCINE GREATER THAN OR EQUAL TO 8YO IM    Hepatitis B antibody positive    Orders:    Hepatitis B surface antigen; Future    Class 3 severe obesity without serious comorbidity with body mass index (BMI) of 40.0 to 44.9 in adult, unspecified obesity type (HCC)      Orders:    Hemoglobin A1C; Future    Lipid Panel with Direct LDL reflex; Future    Comprehensive metabolic panel; Future      Immunizations and  preventive care screenings were discussed with patient today. Appropriate education was printed on patient's after visit summary.    Counseling:  Alcohol/drug use: discussed moderation in alcohol intake, the recommendations for healthy alcohol use, and avoidance of illicit drug use.  Dental Health: discussed importance of regular tooth brushing, flossing, and dental visits.  Injury prevention: discussed safety/seat belts, safety helmets, smoke detectors, carbon monoxide detectors, and smoking near bedding or upholstery.  Sexual health: discussed sexually transmitted diseases, partner selection, use of condoms, avoidance of unintended pregnancy, and contraceptive alternatives.  Exercise: the importance of regular exercise/physical activity was discussed. Recommend exercise 3-5 times per week for at least 30 minutes.     BMI Counseling: Body mass index is 41 kg/m². The BMI is above normal. Nutrition recommendations include moderation in carbohydrate intake and reducing intake of cholesterol. Exercise recommendations include moderate physical activity 150 minutes/week. Rationale for BMI follow-up plan is due to patient being overweight or obese.     Depression Screening and Follow-up Plan: Patient was screened for depression during today's encounter. They screened negative with a PHQ-2 score of 0.        History of Present Illness     Adult Annual Physical:  Patient presents for annual physical. Mery Baker is a 59 y.o. female who presents for annual physical.   Patient reports that she has been having dark colored stool and has noticed blood when wiping after a bowel movement. Patient is concerned and would like a colonoscopy.   Also notes some pain in her left ear. Thinks she has a pimple there. Has been cleaning her AirPods after use and using hydrocortisone cream with good relief. .     Diet and Physical Activity:  - Diet/Nutrition: well balanced diet and consuming 3-5 servings of fruits/vegetables daily.  -  Exercise: no formal exercise.    Depression Screening:  - PHQ-2 Score: 0    General Health:  - Sleep: sleeps well and 4-6 hours of sleep on average.  - Hearing: decreased hearing right ear and normal hearing left ear. perforated ear drums, had surgery  - Vision: no vision problems and most recent eye exam < 1 year ago.  - Dental: regular dental visits and brushes teeth twice daily.    /GYN Health:  - Follows with GYN: no.   - Menopause: postmenopausal.   - History of STDs: no  Follows with [facility] for GYN health, due for Pap smear     Review of Systems   Constitutional:  Negative for chills and fever.   HENT:  Positive for ear pain (left ear). Negative for sore throat.    Eyes:  Negative for pain and visual disturbance.   Respiratory:  Negative for cough and shortness of breath.    Cardiovascular:  Negative for chest pain and palpitations.   Gastrointestinal:  Negative for abdominal pain and vomiting.   Genitourinary:  Negative for dysuria and hematuria.   Musculoskeletal:  Negative for arthralgias and back pain.   Skin:  Negative for color change and rash.   Neurological:  Negative for dizziness, light-headedness and headaches.   All other systems reviewed and are negative.    Medical History Reviewed by provider this encounter:  Tobacco  Allergies  Meds  Problems  Med Hx  Surg Hx  Fam Hx     .  Current Outpatient Medications on File Prior to Visit   Medication Sig Dispense Refill    hydrocortisone 1 % cream Apply topically 4 (four) times a day as needed for irritation or rash (ourter ear irritation) 30 g 0    cetirizine (ZyrTEC) 10 mg tablet Take 1 tablet (10 mg total) by mouth daily (Patient not taking: Reported on 8/30/2023) 90 tablet 2    fluticasone (FLONASE) 50 mcg/act nasal spray 1 spray into each nostril daily (Patient not taking: Reported on 8/30/2023) 9.9 mL 2    oxyCODONE (Roxicodone) 5 immediate release tablet Take 1 tablet (5 mg total) by mouth every 4 (four) hours as needed for moderate pain for  "up to 6 doses Max Daily Amount: 30 mg (Patient not taking: Reported on 8/30/2023) 6 tablet 0    pantoprazole (PROTONIX) 40 mg tablet Take 1 tablet (40 mg total) by mouth daily for 14 days (Patient not taking: Reported on 1/27/2025) 14 tablet 0     No current facility-administered medications on file prior to visit.      Social History     Tobacco Use    Smoking status: Never    Smokeless tobacco: Never   Vaping Use    Vaping status: Never Used   Substance and Sexual Activity    Alcohol use: No    Drug use: No    Sexual activity: Never     Birth control/protection: Post-menopausal       Objective   /80 (BP Location: Left arm, Patient Position: Sitting, Cuff Size: Standard)   Pulse 79   Temp 99 °F (37.2 °C) (Tympanic)   Ht 4' 11\" (1.499 m)   Wt 92.1 kg (203 lb)   SpO2 97%   BMI 41.00 kg/m²     Physical Exam  Vitals reviewed.   Constitutional:       General: She is not in acute distress.     Appearance: She is obese.   HENT:      Head: Normocephalic and atraumatic.      Right Ear: Tympanic membrane, ear canal and external ear normal.      Left Ear: Tympanic membrane and external ear normal.      Ears:      Comments: Pimple noted near opening of left ear canal      Nose: Nose normal.      Mouth/Throat:      Mouth: Mucous membranes are moist.      Pharynx: Oropharynx is clear.   Eyes:      General: No scleral icterus.     Extraocular Movements: Extraocular movements intact.      Conjunctiva/sclera: Conjunctivae normal.      Pupils: Pupils are equal, round, and reactive to light.   Cardiovascular:      Rate and Rhythm: Normal rate and regular rhythm.      Heart sounds: Normal heart sounds. No murmur heard.  Pulmonary:      Effort: Pulmonary effort is normal. No respiratory distress.      Breath sounds: Normal breath sounds.   Abdominal:      General: Abdomen is flat. Bowel sounds are normal.      Palpations: Abdomen is soft. There is no mass.      Tenderness: There is no abdominal tenderness. "   Musculoskeletal:         General: Normal range of motion.      Cervical back: Normal range of motion and neck supple. No tenderness.      Right lower leg: No edema.      Left lower leg: No edema.   Lymphadenopathy:      Cervical: No cervical adenopathy.   Skin:     General: Skin is warm.   Neurological:      General: No focal deficit present.      Mental Status: She is alert and oriented to person, place, and time.      Cranial Nerves: No cranial nerve deficit.      Sensory: No sensory deficit.      Motor: No weakness.      Coordination: Coordination normal.      Gait: Gait normal.   Psychiatric:         Mood and Affect: Mood normal.         Behavior: Behavior normal.

## 2025-01-28 ENCOUNTER — RESULTS FOLLOW-UP (OUTPATIENT)
Age: 60
End: 2025-01-28

## 2025-01-28 ENCOUNTER — TELEPHONE (OUTPATIENT)
Age: 60
End: 2025-01-28

## 2025-01-28 ENCOUNTER — APPOINTMENT (OUTPATIENT)
Dept: LAB | Facility: CLINIC | Age: 60
End: 2025-01-28
Payer: COMMERCIAL

## 2025-01-28 ENCOUNTER — PREP FOR PROCEDURE (OUTPATIENT)
Age: 60
End: 2025-01-28

## 2025-01-28 DIAGNOSIS — R76.8 HEPATITIS B ANTIBODY POSITIVE: ICD-10-CM

## 2025-01-28 DIAGNOSIS — Z12.11 SCREENING FOR COLON CANCER: Primary | ICD-10-CM

## 2025-01-28 DIAGNOSIS — E66.01 CLASS 3 SEVERE OBESITY WITHOUT SERIOUS COMORBIDITY WITH BODY MASS INDEX (BMI) OF 40.0 TO 44.9 IN ADULT, UNSPECIFIED OBESITY TYPE (HCC): ICD-10-CM

## 2025-01-28 DIAGNOSIS — Z11.4 SCREENING FOR HIV (HUMAN IMMUNODEFICIENCY VIRUS): ICD-10-CM

## 2025-01-28 DIAGNOSIS — E66.813 CLASS 3 SEVERE OBESITY WITHOUT SERIOUS COMORBIDITY WITH BODY MASS INDEX (BMI) OF 40.0 TO 44.9 IN ADULT, UNSPECIFIED OBESITY TYPE (HCC): ICD-10-CM

## 2025-01-28 DIAGNOSIS — K92.1 BLOOD IN STOOL: ICD-10-CM

## 2025-01-28 LAB
ALBUMIN SERPL BCG-MCNC: 4.2 G/DL (ref 3.5–5)
ALP SERPL-CCNC: 61 U/L (ref 34–104)
ALT SERPL W P-5'-P-CCNC: 27 U/L (ref 7–52)
ANION GAP SERPL CALCULATED.3IONS-SCNC: 7 MMOL/L (ref 4–13)
AST SERPL W P-5'-P-CCNC: 21 U/L (ref 13–39)
BASOPHILS # BLD AUTO: 0.06 THOUSANDS/ΜL (ref 0–0.1)
BASOPHILS NFR BLD AUTO: 1 % (ref 0–1)
BILIRUB SERPL-MCNC: 0.79 MG/DL (ref 0.2–1)
BUN SERPL-MCNC: 13 MG/DL (ref 5–25)
CALCIUM SERPL-MCNC: 9.8 MG/DL (ref 8.4–10.2)
CHLORIDE SERPL-SCNC: 104 MMOL/L (ref 96–108)
CHOLEST SERPL-MCNC: 223 MG/DL (ref ?–200)
CO2 SERPL-SCNC: 27 MMOL/L (ref 21–32)
CREAT SERPL-MCNC: 0.83 MG/DL (ref 0.6–1.3)
EOSINOPHIL # BLD AUTO: 0.19 THOUSAND/ΜL (ref 0–0.61)
EOSINOPHIL NFR BLD AUTO: 3 % (ref 0–6)
ERYTHROCYTE [DISTWIDTH] IN BLOOD BY AUTOMATED COUNT: 12.5 % (ref 11.6–15.1)
EST. AVERAGE GLUCOSE BLD GHB EST-MCNC: 123 MG/DL
GFR SERPL CREATININE-BSD FRML MDRD: 77 ML/MIN/1.73SQ M
GLUCOSE P FAST SERPL-MCNC: 107 MG/DL (ref 65–99)
HBA1C MFR BLD: 5.9 %
HBV SURFACE AG SER QL: NORMAL
HCT VFR BLD AUTO: 43.4 % (ref 34.8–46.1)
HDLC SERPL-MCNC: 57 MG/DL
HGB BLD-MCNC: 14.1 G/DL (ref 11.5–15.4)
HIV 1+2 AB+HIV1 P24 AG SERPL QL IA: NORMAL
HIV 2 AB SERPL QL IA: NORMAL
HIV1 AB SERPL QL IA: NORMAL
HIV1 P24 AG SERPL QL IA: NORMAL
IMM GRANULOCYTES # BLD AUTO: 0.02 THOUSAND/UL (ref 0–0.2)
IMM GRANULOCYTES NFR BLD AUTO: 0 % (ref 0–2)
LDLC SERPL CALC-MCNC: 140 MG/DL (ref 0–100)
LYMPHOCYTES # BLD AUTO: 2.21 THOUSANDS/ΜL (ref 0.6–4.47)
LYMPHOCYTES NFR BLD AUTO: 38 % (ref 14–44)
MCH RBC QN AUTO: 29.5 PG (ref 26.8–34.3)
MCHC RBC AUTO-ENTMCNC: 32.5 G/DL (ref 31.4–37.4)
MCV RBC AUTO: 91 FL (ref 82–98)
MONOCYTES # BLD AUTO: 0.49 THOUSAND/ΜL (ref 0.17–1.22)
MONOCYTES NFR BLD AUTO: 9 % (ref 4–12)
NEUTROPHILS # BLD AUTO: 2.82 THOUSANDS/ΜL (ref 1.85–7.62)
NEUTS SEG NFR BLD AUTO: 49 % (ref 43–75)
NRBC BLD AUTO-RTO: 0 /100 WBCS
PLATELET # BLD AUTO: 289 THOUSANDS/UL (ref 149–390)
PMV BLD AUTO: 9.2 FL (ref 8.9–12.7)
POTASSIUM SERPL-SCNC: 3.9 MMOL/L (ref 3.5–5.3)
PROT SERPL-MCNC: 7.9 G/DL (ref 6.4–8.4)
RBC # BLD AUTO: 4.78 MILLION/UL (ref 3.81–5.12)
SODIUM SERPL-SCNC: 138 MMOL/L (ref 135–147)
TRIGL SERPL-MCNC: 131 MG/DL (ref ?–150)
WBC # BLD AUTO: 5.79 THOUSAND/UL (ref 4.31–10.16)

## 2025-01-28 PROCEDURE — 36415 COLL VENOUS BLD VENIPUNCTURE: CPT

## 2025-01-28 PROCEDURE — 87340 HEPATITIS B SURFACE AG IA: CPT

## 2025-01-28 PROCEDURE — 80053 COMPREHEN METABOLIC PANEL: CPT

## 2025-01-28 PROCEDURE — 80061 LIPID PANEL: CPT

## 2025-01-28 PROCEDURE — 83036 HEMOGLOBIN GLYCOSYLATED A1C: CPT

## 2025-01-28 PROCEDURE — 85025 COMPLETE CBC W/AUTO DIFF WBC: CPT

## 2025-01-28 PROCEDURE — 87389 HIV-1 AG W/HIV-1&-2 AB AG IA: CPT

## 2025-01-28 NOTE — TELEPHONE ENCOUNTER
Scheduled date of colonoscopy (as of today): 4/11/25  Physician performing colonoscopy: Dr. Mishra  Location of colonoscopy: [facility]  Bowel prep reviewed with patient: zakia  Instructions reviewed with patient by: Stephanie BARRY, sent via [employer]t letters, pt's  aware  Clearances: N/A

## 2025-01-28 NOTE — TELEPHONE ENCOUNTER
01/28/25  Screened by: Stephanie Schmidt MA    Referring Provider Dr. Iman Moreno    Pre- Screening:     There is no height or weight on file to calculate BMI.  Has patient been referred for a routine screening Colonoscopy? yes  Is the patient between 45-75 years old? yes      Previous Colonoscopy no   If yes:    Date:     Facility:     Reason:       Does the patient want to see a Gastroenterologist prior to their procedure OR are they having any GI symptoms? yes, blood in stool    Has the patient been hospitalized or had abdominal surgery in the past 6 months? no    Does the patient use supplemental oxygen? no    Does the patient take Coumadin, Lovenox, Plavix, Elliquis, Xarelto, or other blood thinning medication? no    Has the patient had a stroke, cardiac event, or stent placed in the past year? no      If patient is between 45yrs - 49yrs, please advise patient that we will have to confirm benefits & coverage with their insurance company for a routine screening colonoscopy.    PT'S  ANSWERED THESE QUESTIONS TO THE BEST OF HIS KNOWLEDGE.

## 2025-01-28 NOTE — LETTER
January 28, 2025        Dear Greg Hernandez please find prep instructions for your scheduled colonoscopy on 4/11/25 at [facility], [address], [place], [address].    Please contact us at [phone] with any questions.      Sincerely,            [facility]

## 2025-03-28 ENCOUNTER — ANESTHESIA EVENT (OUTPATIENT)
Dept: ANESTHESIOLOGY | Facility: HOSPITAL | Age: 60
End: 2025-03-28

## 2025-03-28 ENCOUNTER — ANESTHESIA (OUTPATIENT)
Dept: ANESTHESIOLOGY | Facility: HOSPITAL | Age: 60
End: 2025-03-28

## 2025-04-02 ENCOUNTER — TELEPHONE (OUTPATIENT)
Age: 60
End: 2025-04-02

## 2025-04-02 NOTE — TELEPHONE ENCOUNTER
Left message confirming procedure 4/11. She will need a  and will be called day prior with her arrival time. If she still needs her prep instructions, they are in her my chart. Any questions, she may call.

## 2025-04-11 ENCOUNTER — ANESTHESIA (OUTPATIENT)
Dept: GASTROENTEROLOGY | Facility: AMBULARY SURGERY CENTER | Age: 60
End: 2025-04-11
Payer: COMMERCIAL

## 2025-04-11 ENCOUNTER — HOSPITAL ENCOUNTER (OUTPATIENT)
Dept: GASTROENTEROLOGY | Facility: AMBULARY SURGERY CENTER | Age: 60
Setting detail: OUTPATIENT SURGERY
Discharge: HOME | End: 2025-04-11
Attending: INTERNAL MEDICINE
Payer: COMMERCIAL

## 2025-04-11 VITALS
OXYGEN SATURATION: 96 % | BODY MASS INDEX: 40.32 KG/M2 | DIASTOLIC BLOOD PRESSURE: 71 MMHG | RESPIRATION RATE: 22 BRPM | TEMPERATURE: 98.9 F | HEIGHT: 59 IN | WEIGHT: 200 LBS | SYSTOLIC BLOOD PRESSURE: 117 MMHG | HEART RATE: 81 BPM

## 2025-04-11 DIAGNOSIS — Z12.11 SCREENING FOR COLON CANCER: ICD-10-CM

## 2025-04-11 PROCEDURE — G0121 COLON CA SCRN NOT HI RSK IND: HCPCS | Performed by: INTERNAL MEDICINE

## 2025-04-11 RX ORDER — SODIUM CHLORIDE, SODIUM LACTATE, POTASSIUM CHLORIDE, CALCIUM CHLORIDE 600; 310; 30; 20 MG/100ML; MG/100ML; MG/100ML; MG/100ML
INJECTION, SOLUTION INTRAVENOUS CONTINUOUS PRN
Status: DISCONTINUED | OUTPATIENT
Start: 2025-04-11 | End: 2025-04-11

## 2025-04-11 RX ORDER — PROPOFOL 10 MG/ML
INJECTION, EMULSION INTRAVENOUS CONTINUOUS PRN
Status: DISCONTINUED | OUTPATIENT
Start: 2025-04-11 | End: 2025-04-11

## 2025-04-11 RX ORDER — LIDOCAINE HYDROCHLORIDE 10 MG/ML
INJECTION, SOLUTION EPIDURAL; INFILTRATION; INTRACAUDAL; PERINEURAL AS NEEDED
Status: DISCONTINUED | OUTPATIENT
Start: 2025-04-11 | End: 2025-04-11

## 2025-04-11 RX ORDER — PROPOFOL 10 MG/ML
INJECTION, EMULSION INTRAVENOUS AS NEEDED
Status: DISCONTINUED | OUTPATIENT
Start: 2025-04-11 | End: 2025-04-11

## 2025-04-11 RX ADMIN — PROPOFOL 120 MCG/KG/MIN: 10 INJECTION, EMULSION INTRAVENOUS at 10:38

## 2025-04-11 RX ADMIN — PROPOFOL 40 MG: 10 INJECTION, EMULSION INTRAVENOUS at 10:43

## 2025-04-11 RX ADMIN — SODIUM CHLORIDE, SODIUM LACTATE, POTASSIUM CHLORIDE, AND CALCIUM CHLORIDE: .6; .31; .03; .02 INJECTION, SOLUTION INTRAVENOUS at 10:35

## 2025-04-11 RX ADMIN — LIDOCAINE HYDROCHLORIDE 50 MG: 10 INJECTION, SOLUTION EPIDURAL; INFILTRATION; INTRACAUDAL at 10:38

## 2025-04-11 RX ADMIN — PROPOFOL 40 MG: 10 INJECTION, EMULSION INTRAVENOUS at 10:40

## 2025-04-11 RX ADMIN — PROPOFOL 80 MG: 10 INJECTION, EMULSION INTRAVENOUS at 10:38

## 2025-04-11 NOTE — ANESTHESIA POSTPROCEDURE EVALUATION
Post-Op Assessment Note    CV Status:  Stable  Pain Score: 0    Pain management: adequate       Mental Status:  Sleepy   Hydration Status:  Euvolemic   PONV Controlled:  Controlled   Airway Patency:  Patent     Post Op Vitals Reviewed: Yes    No anethesia notable event occurred.    Staff: CRNA           Last Filed PACU Vitals:  Vitals Value Taken Time   Temp     Pulse 87    /56    Resp 15    SpO2 98%

## 2025-04-11 NOTE — ANESTHESIA PREPROCEDURE EVALUATION
Procedure:  COLONOSCOPY    Relevant Problems   ANESTHESIA   (-) History of anesthesia complications      CARDIO   (-) Chest pain   (-) ALONSO (dyspnea on exertion)      GI/HEPATIC   (+) GERD (gastroesophageal reflux disease)      PULMONARY   (-) Respiratory failure (HCC)   (-) Shortness of breath   (-) URI (upper respiratory infection)      Ear/Nose/Throat   (+) Laryngeal stenosis      Placement Date: 07/14/23; Placement Time: 1453; Mask Ventilation: Ventilated by mask (1); Technique: Video laryngoscopy; Type: Cuffed, Oral; Tube Size: 6 mm; Laryngoscope: [place]a; Blade Size: 3; Location: Oral; Grade View: 2a; Insertion Attempts: 1;   Physical Exam    Airway    Mallampati score: II  TM Distance: >3 FB  Neck ROM: full     Dental       Cardiovascular      Pulmonary      Other Findings  post-pubertal.      Anesthesia Plan  ASA Score- 2     Anesthesia Type- IV sedation with anesthesia with ASA Monitors.         Additional Monitors:     Airway Plan:            Plan Factors-Exercise tolerance (METS): >4 METS.    Chart reviewed. EKG reviewed.  Existing labs reviewed. Patient summary reviewed.                  Induction- intravenous.    Postoperative Plan-         Informed Consent- Anesthetic plan and risks discussed with patient.  I personally reviewed this patient with the CRNA. Discussed and agreed on the Anesthesia Plan with the CRNA..      NPO Status:  Vitals Value Taken Time   Date of last liquid 04/11/25 04/11/25 0929   Time of last liquid 0500 04/11/25 0929   Date of last solid 04/09/25 04/11/25 0929   Time of last solid 2100 04/11/25 0929

## 2025-04-11 NOTE — H&P
History and Physical - [facility]  Mery Baker 60 y.o. female MRN: [id number]    HPI: Mery Baker is a 60 y.o. 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    BREAST SURGERY      CHOLECYSTECTOMY LAPAROSCOPIC N/A 07/14/2023    Procedure: CHOLECYSTECTOMY LAPAROSCOPIC;  Surgeon: Kayode Diaz DO;  Location: [facility];  Service: General    EAR SURGERY Left     ear drum repair    MAMMO NEEDLE LOCALIZATION RIGHT (ALL INC) Right 01/14/2020    MA BRNCHSC INCL FLUOR GDNCE DX W/CELL WASHG SPX N/A 10/19/2018    Procedure: BRONCHOSCOPY RIGID;  Surgeon: Jarvis Soto MD;  Location: [facility];  Service: ENT    MA LARGSC EXC ANDERSON&/STRPG CORDS/EPIGL MCRSCP/TLSCP N/A 10/19/2018    Procedure: EXCISION LARYNGEAL SCAR; SUPRAGLOTTOPLASTY  POSSIBLE JET VENTILATION; CO2 LASER; COBLATOR;  Surgeon: Jarvis Soto MD;  [facility];  Service: ENT    MA LARYNGOSCOPY DIRECT OPERATIVE W/BIOPSY N/A 10/19/2018    Procedure: MICRO DIRECT LARYNGOSCOPY;  Surgeon: Jarvis Soto MD;  Location[facility];  Service: ENT    US GUIDED BREAST BIOPSY RIGHT COMPLETE Right 11/25/2019     Social History   Social History     Substance and Sexual Activity   Alcohol Use No     Social History     Substance and Sexual Activity   Drug Use No     Social History     Tobacco Use   Smoking Status Never   Smokeless Tobacco Never     Family History  "  Problem Relation Age of Onset    Lung cancer Mother 74    No Known Problems Father     No Known Problems Sister     No Known Problems Sister     No Known Problems Daughter     No Known Problems Maternal Grandmother     No Known Problems Maternal Grandfather     No Known Problems Paternal Grandmother     Liver cancer Brother     No Known Problems Maternal Aunt     No Known Problems Paternal Aunt     No Known Problems Paternal Aunt        Meds/Allergies     Not in a hospital admission.    Allergies   Allergen Reactions    Pollen Extract Allergic Rhinitis       Objective     /84   Pulse 87   Temp 98 °F (36.7 °C) (Temporal)   Resp 18   Ht 4' 11\" (1.499 m)   Wt 90.7 kg (200 lb)   SpO2 96%   BMI 40.40 kg/m²       PHYSICAL EXAM    Gen: NAD  CV: RRR  CHEST: Clear  ABD: soft, NT/ND  EXT: no edema  Neuro: AAO      ASSESSMENT/PLAN:  This is a 60 y.o. year old female here for  screening colonoscopy      PLAN:   Procedure: colonoscopy      "

## 2025-04-25 ENCOUNTER — RESULTS FOLLOW-UP (OUTPATIENT)
Age: 60
End: 2025-04-25

## 2025-04-30 ENCOUNTER — HOSPITAL ENCOUNTER (OUTPATIENT)
Dept: MAMMOGRAPHY | Facility: HOSPITAL | Age: 60
Discharge: HOME | End: 2025-04-30
Payer: COMMERCIAL

## 2025-04-30 VITALS — WEIGHT: 199.96 LBS | HEIGHT: 59 IN | BODY MASS INDEX: 40.31 KG/M2

## 2025-04-30 DIAGNOSIS — Z12.31 ENCOUNTER FOR SCREENING MAMMOGRAM FOR BREAST CANCER: ICD-10-CM

## 2025-04-30 PROCEDURE — 77063 BREAST TOMOSYNTHESIS BI: CPT

## 2025-04-30 PROCEDURE — 77067 SCR MAMMO BI INCL CAD: CPT

## (undated) DEVICE — SYRINGE 50ML LL

## (undated) DEVICE — MEDI-VAC YANK SUCT HNDL W/TPRD BULBOUS TIP: Brand: CARDINAL HEALTH

## (undated) DEVICE — SUT MONOCRYL 4-0 PS-2 18 IN Y496G

## (undated) DEVICE — VIAL DECANTER

## (undated) DEVICE — TROCARS: Brand: KII® BALLOON BLUNT TIP SYSTEM

## (undated) DEVICE — BETHLEHEM UNIVERSAL MINOR GEN: Brand: CARDINAL HEALTH

## (undated) DEVICE — BETHLEHEM UNIVERSAL OUTPATIENT: Brand: CARDINAL HEALTH

## (undated) DEVICE — INTENDED FOR TISSUE SEPARATION, AND OTHER PROCEDURES THAT REQUIRE A SHARP SURGICAL BLADE TO PUNCTURE OR CUT.: Brand: BARD-PARKER SAFETY BLADES SIZE 15, STERILE

## (undated) DEVICE — LIGHT HANDLE COVER SLEEVE DISP BLUE STELLAR

## (undated) DEVICE — DRAPE PROBE NEO-PROBE/ULTRASOUND

## (undated) DEVICE — ADHESIVE SKIN HIGH VISCOSITY EXOFIN 1ML

## (undated) DEVICE — NEURO PATTIES 1/2 X 1 1/2

## (undated) DEVICE — SUT VICRYL 3-0 SH 27 IN J416H

## (undated) DEVICE — PENCIL ELECTROSURG E-Z CLEAN -0035H

## (undated) DEVICE — LIGAMAX 5 MM ENDOSCOPIC MULTIPLE CLIP APPLIER: Brand: LIGAMAX

## (undated) DEVICE — ANTI-FOG SOLUTION WITH FOAM PAD: Brand: DEVON

## (undated) DEVICE — DRAPE EQUIPMENT RF WAND

## (undated) DEVICE — CHLORAPREP HI-LITE 26ML ORANGE

## (undated) DEVICE — SYRINGE 5ML LL

## (undated) DEVICE — NEEDLE 25G X 1 1/2

## (undated) DEVICE — SCD SEQUENTIAL COMPRESSION COMFORT SLEEVE MEDIUM KNEE LENGTH: Brand: KENDALL SCD

## (undated) DEVICE — TUBING SUCTION 5MM X 12 FT

## (undated) DEVICE — SYRINGE 1ML TB 26G X 3/8 SAFETY

## (undated) DEVICE — GLOVE INDICATOR PI UNDERGLOVE SZ 7.5 BLUE

## (undated) DEVICE — INTENDED FOR TISSUE SEPARATION, AND OTHER PROCEDURES THAT REQUIRE A SHARP SURGICAL BLADE TO PUNCTURE OR CUT.: Brand: BARD-PARKER SAFETY BLADES SIZE 11, STERILE

## (undated) DEVICE — TROCAR: Brand: KII® SLEEVE

## (undated) DEVICE — SYRINGE 3ML LL

## (undated) DEVICE — SMOKE EVACUATION TUBING WITH 8 IN INTEGRAL WAND AND SPONGE GUARD: Brand: BUFFALO FILTER

## (undated) DEVICE — GLOVE SRG BIOGEL 7

## (undated) DEVICE — MARGIN MARKER SET

## (undated) DEVICE — SUT VICRYL PLUS 0 UR-6 27IN VCP603H

## (undated) DEVICE — TROCAR: Brand: KII FIOS FIRST ENTRY

## (undated) DEVICE — 3000CC GUARDIAN II: Brand: GUARDIAN

## (undated) DEVICE — 3M™ STERI-STRIP™ REINFORCED ADHESIVE SKIN CLOSURES, R1547, 1/2 IN X 4 IN (12 MM X 100 MM), 6 STRIPS/ENVELOPE: Brand: 3M™ STERI-STRIP™

## (undated) DEVICE — PROCISE MLW WAND: Brand: COBLATION

## (undated) DEVICE — CLAMP TOWEL TUBING DISPOSABLE

## (undated) DEVICE — PACK PBDS LAP CHOLE RF